# Patient Record
Sex: FEMALE | Race: BLACK OR AFRICAN AMERICAN | NOT HISPANIC OR LATINO | Employment: OTHER | URBAN - METROPOLITAN AREA
[De-identification: names, ages, dates, MRNs, and addresses within clinical notes are randomized per-mention and may not be internally consistent; named-entity substitution may affect disease eponyms.]

---

## 2017-01-11 ENCOUNTER — OFFICE VISIT (OUTPATIENT)
Dept: INTERNAL MEDICINE | Facility: CLINIC | Age: 67
End: 2017-01-11
Payer: MEDICARE

## 2017-01-11 VITALS
WEIGHT: 130.94 LBS | BODY MASS INDEX: 24.09 KG/M2 | HEIGHT: 62 IN | HEART RATE: 83 BPM | DIASTOLIC BLOOD PRESSURE: 90 MMHG | SYSTOLIC BLOOD PRESSURE: 164 MMHG

## 2017-01-11 DIAGNOSIS — M54.50 CHRONIC MIDLINE LOW BACK PAIN WITHOUT SCIATICA: ICD-10-CM

## 2017-01-11 DIAGNOSIS — G89.29 CHRONIC MIDLINE LOW BACK PAIN WITHOUT SCIATICA: ICD-10-CM

## 2017-01-11 DIAGNOSIS — Z23 NEEDS FLU SHOT: ICD-10-CM

## 2017-01-11 DIAGNOSIS — G47.00 INSOMNIA, UNSPECIFIED TYPE: ICD-10-CM

## 2017-01-11 DIAGNOSIS — D17.1 LIPOMA OF TORSO: ICD-10-CM

## 2017-01-11 DIAGNOSIS — E78.5 HYPERLIPIDEMIA, UNSPECIFIED HYPERLIPIDEMIA TYPE: ICD-10-CM

## 2017-01-11 DIAGNOSIS — D69.6 THROMBOCYTOPENIA: ICD-10-CM

## 2017-01-11 DIAGNOSIS — I10 ESSENTIAL HYPERTENSION: Primary | ICD-10-CM

## 2017-01-11 DIAGNOSIS — F17.200 TOBACCO DEPENDENCE: ICD-10-CM

## 2017-01-11 PROCEDURE — 1160F RVW MEDS BY RX/DR IN RCRD: CPT | Mod: S$GLB,,, | Performed by: INTERNAL MEDICINE

## 2017-01-11 PROCEDURE — 1159F MED LIST DOCD IN RCRD: CPT | Mod: S$GLB,,, | Performed by: INTERNAL MEDICINE

## 2017-01-11 PROCEDURE — 99214 OFFICE O/P EST MOD 30 MIN: CPT | Mod: S$GLB,,, | Performed by: INTERNAL MEDICINE

## 2017-01-11 PROCEDURE — G0008 ADMIN INFLUENZA VIRUS VAC: HCPCS | Mod: S$GLB,,, | Performed by: INTERNAL MEDICINE

## 2017-01-11 PROCEDURE — 99999 PR PBB SHADOW E&M-EST. PATIENT-LVL IV: CPT | Mod: PBBFAC,,, | Performed by: INTERNAL MEDICINE

## 2017-01-11 PROCEDURE — 3077F SYST BP >= 140 MM HG: CPT | Mod: S$GLB,,, | Performed by: INTERNAL MEDICINE

## 2017-01-11 PROCEDURE — 3080F DIAST BP >= 90 MM HG: CPT | Mod: S$GLB,,, | Performed by: INTERNAL MEDICINE

## 2017-01-11 PROCEDURE — 1125F AMNT PAIN NOTED PAIN PRSNT: CPT | Mod: S$GLB,,, | Performed by: INTERNAL MEDICINE

## 2017-01-11 PROCEDURE — 1157F ADVNC CARE PLAN IN RCRD: CPT | Mod: S$GLB,,, | Performed by: INTERNAL MEDICINE

## 2017-01-11 PROCEDURE — 90662 IIV NO PRSV INCREASED AG IM: CPT | Mod: S$GLB,,, | Performed by: INTERNAL MEDICINE

## 2017-01-11 RX ORDER — ETODOLAC 400 MG/1
400 TABLET, EXTENDED RELEASE ORAL DAILY PRN
Qty: 30 TABLET | Refills: 1 | Status: SHIPPED | OUTPATIENT
Start: 2017-01-11 | End: 2017-08-19

## 2017-01-11 RX ORDER — ETODOLAC 400 MG/1
400 TABLET, EXTENDED RELEASE ORAL DAILY PRN
Qty: 30 TABLET | Refills: 1 | Status: SHIPPED | OUTPATIENT
Start: 2017-01-11 | End: 2017-01-11 | Stop reason: SDUPTHER

## 2017-01-11 RX ORDER — TRAMADOL HYDROCHLORIDE 50 MG/1
50 TABLET ORAL EVERY 8 HOURS PRN
Qty: 60 TABLET | Refills: 0 | Status: SHIPPED | OUTPATIENT
Start: 2017-01-11 | End: 2017-01-21

## 2017-01-11 NOTE — PROGRESS NOTES
Subjective:       Patient ID: Roma Dunbar is a 66 y.o. female.    Chief Complaint: Low-back Pain and Insomnia    HPI Pt. Here for low back pain, insomnia and f/u for HTN and hyperlipedemia; lumbar xray show degenerative changes; she has not tried lodine; she states pain is 9/10; she states back is still bothersome and pain worse with ROM; of note pt. Looks comfortable during visit and does not match pain scale; she also reports SQ lump at site for past 1 week; she has restarted zocor; I reviewed labs dated 9/28/16; platelets have normalized; she smokes PPD; she would like smoking cessation course; BP is elevated  Review of Systems   Constitutional: Negative for chills, fatigue and fever.   HENT: Negative for congestion, rhinorrhea and sore throat.    Respiratory: Negative for cough, shortness of breath and wheezing.    Cardiovascular: Negative for chest pain.   Gastrointestinal: Negative for abdominal pain, nausea and vomiting.   Genitourinary: Negative for dysuria.   Musculoskeletal: Positive for back pain. Negative for arthralgias.        Low back pain with ROM   Skin: Negative for rash.        SQ lump to low back which can be painful to the touch   Neurological: Negative for dizziness and headaches.   Psychiatric/Behavioral: Negative for sleep disturbance. The patient is not nervous/anxious.        Objective:      Physical Exam   Constitutional: She is oriented to person, place, and time. She appears well-developed.   Eyes: EOM are normal.   Neck: Normal range of motion.   Cardiovascular: Normal rate, regular rhythm and normal heart sounds.    Pulmonary/Chest: Effort normal and breath sounds normal.   Abdominal: Soft. There is no tenderness. There is no rebound and no guarding.   Musculoskeletal: Normal range of motion. She exhibits tenderness.   Low back tenderness at site of olive size SQ nodule; no erythema or sign of infection noted; low back pain with ROM; pt. ROM is fluid and inconsistent with 9/10 pain  scale   Neurological: She is alert and oriented to person, place, and time.   Skin: No rash noted.       Assessment:       1. Essential hypertension Sub-optimally controlled   2. Chronic midline low back pain without sciatica Active   3. Lipoma of torso Active   4. Thrombocytopenia Well controlled   5. Insomnia, unspecified type Active   6. Hyperlipidemia, unspecified hyperlipidemia type Active   7. Tobacco dependence Sub-optimally controlled   8. Needs flu shot Active       Plan:         Essential hypertension  Comments:  continue current regimen and encouraged low Na diet; monitor with better control of low back pain; repeat BP on f/u in 1 month  Orders:  -     CBC auto differential; Future; Expected date: 2/1/17  -     Comprehensive metabolic panel; Future; Expected date: 2/1/17    Chronic midline low back pain without sciatica  Comments:  restart lodine prn and start tramadol PRN for better pain control; refer to pain management  Orders:  -     Discontinue: etodolac (LODINE XL) 400 MG 24 hr tablet; Take 1 tablet (400 mg total) by mouth daily as needed (avoid all other NSAID's).  Dispense: 30 tablet; Refill: 1  -     etodolac (LODINE XL) 400 MG 24 hr tablet; Take 1 tablet (400 mg total) by mouth daily as needed (avoid all other NSAID's).  Dispense: 30 tablet; Refill: 1  -     tramadol (ULTRAM) 50 mg tablet; Take 1 tablet (50 mg total) by mouth every 8 (eight) hours as needed for Pain.  Dispense: 60 tablet; Refill: 0  -     Ambulatory referral to Pain Clinic    Lipoma of torso  Comments:  pt. states she has noticed for only 1 week; will re-evaluate in 1 month and consider surgical referal if size increases    Thrombocytopenia  Comments:  normalized; monitor  Orders:  -     CBC auto differential; Future; Expected date: 2/1/17    Insomnia, unspecified type  Comments:  asked pt. to try benadryl prn and sleep hygeine discussed    Hyperlipidemia, unspecified hyperlipidemia type  Comments:  restarted statin; repeat  lipids prior to 1 month f/u  Orders:  -     Lipid panel; Future; Expected date: 2/1/17    Tobacco dependence  Comments:  encouraged cessation and explained risks   Orders:  -     Ambulatory referral to Smoking Cessation Program    Needs flu shot  -     Influenza - High Dose (65+) (PF) (IM)

## 2017-01-11 NOTE — MR AVS SNAPSHOT
[facility]   [place]  [place] [address]  Phone: [phone]  Fax: [phone]                  Roma Dunbar   2017 8:40 AM   Office Visit    Description:  Female : 1950   Provider:  Rolly Ferro MD   Department:  [facility]           Reason for Visit     Low-back Pain     Insomnia           Diagnoses this Visit        Comments    Essential hypertension    -  Primary continue current regimen and encouraged low Na diet; monitor with better control of low back pain; repeat BP on f/u in 1 month    Chronic midline low back pain without sciatica     restart lodine prn and start tramadol PRN for better pain control; refer to pain management    Lipoma of torso     pt. states she has noticed for only 1 week; will re-evaluate in 1 month and consider surgical referal if no better    Thrombocytopenia     normalized; monitor    Insomnia, unspecified type     asked pt. to try benadryl prn and sleep hygeine discussed    Hyperlipidemia, unspecified hyperlipidemia type     restarted statin; repeat lipids prior to 1 month f/u    Tobacco dependence     encouraged cessation and explained risks     Needs flu shot                To Do List           Future Appointments        Provider Department Dept Phone    2017 9:00 AM Princess Liu MD [facility] [phone]    2017 7:00 AM LAB, [facility] [phone]    2017 10:00 AM Rolly Ferro MD [facility] [phone]      Goals (5 Years of Data)     None      Follow-Up and Disposition     Return in about 1 month (around 2017).       These Medications        Disp Refills Start End    etodolac (LODINE XL) 400 MG 24 hr tablet 30 tablet 1 2017     Take 1 tablet (400 mg total) by mouth daily as needed (avoid all other NSAID's). - Oral    Pharmacy: [id number] - [place] - [address] AT [facility] [address]   #: [phone]       tramadol (ULTRAM) 50 mg tablet 60 tablet 0 1/11/2017 1/21/2017    Take 1 tablet (50 mg total) by mouth every 8 (eight) hours as needed for Pain. - Oral    Pharmacy: [id number] - [place] AT [facility] #: [phone]         [facility] On Call     [facility] On Call Nurse Care Line - 24/7 Assistance  Registered nurses in the [facility] On Call Center provide clinical advisement, health education, appointment booking, and other advisory services.  Call for this free service at [phone].             Medications           Message regarding Medications     Verify the changes and/or additions to your medication regime listed below are the same as discussed with your clinician today.  If any of these changes or additions are incorrect, please notify your healthcare provider.        START taking these NEW medications        Refills    tramadol (ULTRAM) 50 mg tablet 0    Sig: Take 1 tablet (50 mg total) by mouth every 8 (eight) hours as needed for Pain.    Class: Print    Route: Oral           Verify that the below list of medications is an accurate representation of the medications you are currently taking.  If none reported, the list may be blank. If incorrect, please contact your healthcare provider. Carry this list with you in case of emergency.           Current Medications     aspirin 81 MG Chew Take 1 tablet (81 mg total) by mouth once daily.    cilostazol (PLETAL) 50 MG Tab Take 1 tablet (50 mg total) by mouth 2 (two) times daily.    etodolac (LODINE XL) 400 MG 24 hr tablet Take 1 tablet (400 mg total) by mouth daily as needed (avoid all other NSAID's).    lisinopril-hydrochlorothiazide (PRINZIDE,ZESTORETIC) 20-12.5 mg per tablet Take 1 tablet by mouth 2 (two) times daily.    metoprolol succinate (TOPROL-XL) 25 MG 24 hr tablet Take 1 tablet (25 mg total) by mouth once daily.    simvastatin (ZOCOR) 20 MG tablet Take 1 tablet (20 mg  "total) by mouth every evening.    acyclovir (ZOVIRAX) 800 MG Tab Take 1 tablet (800 mg total) by mouth 5 (five) times daily.    loratadine (CLARITIN) 10 mg tablet Take 1 tablet (10 mg total) by mouth once daily.    oxybutynin (DITROPAN) 5 MG Tab Take 1 tablet (5 mg total) by mouth 2 (two) times daily.    tramadol (ULTRAM) 50 mg tablet Take 1 tablet (50 mg total) by mouth every 8 (eight) hours as needed for Pain.           Clinical Reference Information           Vital Signs - Last Recorded  Most recent update: 1/11/2017  8:35 AM by Oneida Jasso MA    BP Pulse Ht Wt BMI    (!) 164/90 (BP Location: Right arm, Patient Position: Sitting, BP Method: Manual) 83 5' 2" (1.575 m) 59.4 kg (130 lb 15.3 oz) 23.95 kg/m2      Blood Pressure          Most Recent Value    BP  (!)  164/90      Allergies as of 1/11/2017     No Known Allergies      Immunizations Administered on Date of Encounter - 1/11/2017     Name Date Dose VIS Date Route    Influenza - High Dose 1/11/2017 0.5 mL 8/7/2015 Intramuscular      Orders Placed During Today's Visit      Normal Orders This Visit    Ambulatory referral to Pain Clinic     Ambulatory referral to Smoking Cessation Program     Influenza - High Dose (65+) (PF) (IM)     Future Labs/Procedures Expected by Expires    CBC auto differential  2/1/2017 9/11/2017    Comprehensive metabolic panel  2/1/2017 9/11/2017    Lipid panel  2/1/2017 9/11/2017      Instructions    Flu consent signed, bandaid applied, pt instructed to wait 15 minutes in [facility]       Smoking Cessation     If you would like to quit smoking:   You may be eligible for free services if you are a [place] resident and started smoking cigarettes before September 1, 1988.  Call the Smoking Cessation Trust ([employer]) toll free at ([phone] or [phone] [phone].   Call 5-800-QUIT-NOW if you do not meet the above criteria.            "

## 2017-01-17 ENCOUNTER — OFFICE VISIT (OUTPATIENT)
Dept: PAIN MEDICINE | Facility: CLINIC | Age: 67
End: 2017-01-17
Payer: MEDICARE

## 2017-01-17 VITALS
DIASTOLIC BLOOD PRESSURE: 60 MMHG | WEIGHT: 125 LBS | HEART RATE: 62 BPM | SYSTOLIC BLOOD PRESSURE: 108 MMHG | BODY MASS INDEX: 22.86 KG/M2

## 2017-01-17 DIAGNOSIS — M46.1 SACROILIITIS: Primary | ICD-10-CM

## 2017-01-17 DIAGNOSIS — M54.16 RIGHT LUMBAR RADICULOPATHY: ICD-10-CM

## 2017-01-17 DIAGNOSIS — M47.816 LUMBAR FACET ARTHROPATHY: ICD-10-CM

## 2017-01-17 PROCEDURE — 1157F ADVNC CARE PLAN IN RCRD: CPT | Mod: S$GLB,,, | Performed by: ANESTHESIOLOGY

## 2017-01-17 PROCEDURE — 99204 OFFICE O/P NEW MOD 45 MIN: CPT | Mod: S$GLB,,, | Performed by: ANESTHESIOLOGY

## 2017-01-17 PROCEDURE — 3074F SYST BP LT 130 MM HG: CPT | Mod: S$GLB,,, | Performed by: ANESTHESIOLOGY

## 2017-01-17 PROCEDURE — 1160F RVW MEDS BY RX/DR IN RCRD: CPT | Mod: S$GLB,,, | Performed by: ANESTHESIOLOGY

## 2017-01-17 PROCEDURE — 1125F AMNT PAIN NOTED PAIN PRSNT: CPT | Mod: S$GLB,,, | Performed by: ANESTHESIOLOGY

## 2017-01-17 PROCEDURE — 99999 PR PBB SHADOW E&M-EST. PATIENT-LVL III: CPT | Mod: PBBFAC,,, | Performed by: ANESTHESIOLOGY

## 2017-01-17 PROCEDURE — 1159F MED LIST DOCD IN RCRD: CPT | Mod: S$GLB,,, | Performed by: ANESTHESIOLOGY

## 2017-01-17 PROCEDURE — 3078F DIAST BP <80 MM HG: CPT | Mod: S$GLB,,, | Performed by: ANESTHESIOLOGY

## 2017-01-17 NOTE — PROGRESS NOTES
Chronic Pain - New Consult    Referring Physician: Rolly Ferro MD    Chief Complaint:   Chief Complaint   Patient presents with    Low-back Pain     referred by Dr. Ferro    Leg Pain     right sided        SUBJECTIVE:    Roma Dunbar presents to the clinic for the evaluation of back pain. The pain started 1 week ago and symptoms have been worsening.The pain is located in the lower back area and radiates to the right lower extremity.  The pain is described as aching and sharp and is rated as 9/10. The pain is rated with a score of  6/10 on the BEST day and a score of 10/10 on the WORST day.  Symptoms interfere with daily activity, sleeping and work. The pain is exacerbated by Laying, Walking, Night Time, Morning and Getting out of bed/chair.  The pain is mitigated by heat and medications. She reports spending 6-7 hours per day reclining. The patient reports 4 hours of uninterrupted sleep per night.    Patient denies night fever/night sweats, urinary incontinence, bowel incontinence, significant weight loss, significant motor weakness and loss of sensations.    Physical Therapy/Home Exercise: no      Pain Disability Index Review:  Last 3 PDI Scores 1/17/2017   Pain Disability Index (PDI) 45       Pain Medications:    - Opioids: Ultram (Tramadol HCL)  - Adjuvant Medications: Lodine XL  - Anti-Coagulants: Aspirin  - Others: see medications list     report:  Reviewed and consistent with medication use as prescribed.    Pain Procedures: None    Imaging: X-Ray Lumbar Spine AP And Lateral 8/24/16  Narrative   HISTORY: Back pain    COMPARISON: None    FINDINGS: 3 views.  The osseous structures are intact with no evidence of fracture or dislocation.  There is degenerative osteoarthritic change with facet hypertrophy of the lumbar spine.  There is abdominal aortic atherosclerosis.  The joint spaces and soft tissues are otherwise unremarkable.   Impression       #1. As above.  ______________________________________      Electronically signed by: ANIL RUIZ MD  Date: 08/24/16  Time: 09:48          Past Medical History   Diagnosis Date    Coronary artery disease     GERD (gastroesophageal reflux disease)     Herpes simplex without mention of complication     Hyperlipidemia     Hypertension     PAD (peripheral artery disease)      Past Surgical History   Procedure Laterality Date    Hysterectomy      Bka   mid 2012     Right side at shin level amputation. 2/2 PVD    Cardiac surgery      Coronary artery bypass graft       before [place]     Social History     Social History    Marital status: Single     Spouse name: N/A    Number of children: N/A    Years of education: N/A     Occupational History    Not on file.     Social History Main Topics    Smoking status: Current Some Day Smoker     Packs/day: 1.50     Years: 40.00     Types: Cigarettes    Smokeless tobacco: Not on file    Alcohol use Yes      Comment: beer on weekends    Drug use: No    Sexual activity: Not on file     Other Topics Concern    Not on file     Social History Narrative     Family History   Problem Relation Age of Onset    Hypertension Mother     Diabetes Mother        Review of patient's allergies indicates:  No Known Allergies    Current Outpatient Prescriptions   Medication Sig    aspirin 81 MG Chew Take 1 tablet (81 mg total) by mouth once daily.    cilostazol (PLETAL) 50 MG Tab Take 1 tablet (50 mg total) by mouth 2 (two) times daily.    etodolac (LODINE XL) 400 MG 24 hr tablet Take 1 tablet (400 mg total) by mouth daily as needed (avoid all other NSAID's).    lisinopril-hydrochlorothiazide (PRINZIDE,ZESTORETIC) 20-12.5 mg per tablet Take 1 tablet by mouth 2 (two) times daily.    metoprolol succinate (TOPROL-XL) 25 MG 24 hr tablet Take 1 tablet (25 mg total) by mouth once daily.    simvastatin (ZOCOR) 20 MG tablet Take 1 tablet (20 mg total) by mouth every evening.    tramadol (ULTRAM) 50 mg tablet Take 1 tablet (50 mg  total) by mouth every 8 (eight) hours as needed for Pain.    acyclovir (ZOVIRAX) 800 MG Tab Take 1 tablet (800 mg total) by mouth 5 (five) times daily.    loratadine (CLARITIN) 10 mg tablet Take 1 tablet (10 mg total) by mouth once daily.    oxybutynin (DITROPAN) 5 MG Tab Take 1 tablet (5 mg total) by mouth 2 (two) times daily.     No current facility-administered medications for this visit.        REVIEW OF SYSTEMS:    GENERAL:  No weight loss, malaise or fevers.  HEENT:  Negative for frequent or significant headaches.  NECK:  + neck pain   RESPIRATORY:  + smoker, Negative for cough, wheezing or shortness of breath.  CARDIOVASCULAR:+ CAD sp CABG years ago , + PAD sp BKA  Negative for chest pain, leg swelling or palpitations.  GI:  Negative for abdominal discomfort, blood in stools or black stools or change in bowel habits.  MUSCULOSKELETAL:  See HPI.  SKIN:  Negative for lesions, rash, and itching.  PSYCH: + for sleep disturbance, -ve for mood disorder and recent psychosocial stressors.  HEMATOLOGY/LYMPHOLOGY:  Negative for prolonged bleeding, bruising easily or swollen nodes.  NEURO:   No history of headaches, syncope, paralysis, seizures or tremors.  All other reviewed and negative other than HPI.    OBJECTIVE:    Visit Vitals    /60    Pulse 62    Wt 56.7 kg (125 lb)    BMI 22.86 kg/m2       PHYSICAL EXAMINATION:    General appearance: Well appearing, in no acute distress, alert and oriented x3.  Psych:  Mood and affect appropriate.  Skin: Skin color, texture, turgor normal, no rashes or lesions, in both upper and lower body.  Head/face:  Normocephalic, atraumatic. No palpable lymph nodes.  Neck: No pain to palpation over the cervical paraspinous muscles. Spurling Negative. No pain with neck flexion, extension, or lateral flexion.   Cor: RRR  Pulm: CTA  Back: Straight leg raising in the sitting and supine positions is negative to radicular pain.+ TTP over the right PSIS and L spine   Extremities:+  artificial right leg  Peripheral joint ROM is full and pain free without obvious instability or laxity in all four extremities. No deformities, edema, or skin discoloration. Good capillary refill.  Musculoskeletal: + Stephen test on the right  + facet loading Bilateral upper and lower extremity strength is normal and symmetric.  No atrophy or tone abnormalities are noted.  Neuro: Bilateral upper and lower extremity coordination and muscle stretch reflexes are physiologic and symmetric. (cannot elicit patellar and ankle R on the right side due to artificial limb)   Plantar response are downgoing. No loss of sensation is noted.  Gait: normal.    ASSESSMENT: 66 y.o. year old female with low back and right buttock/thigh  pain, consistent with sacroiliitis , lumbar facet arthropathy and possible right lumbar radiculopathy     1. Sacroiliitis    2. Right lumbar radiculopathy    3. Lumbar facet arthropathy            PLAN:     - I have stressed the importance of physical activity and a home exercise plan to help with pain and improve health.  -SF right SIJ steroid injection  -Might consider MBB . May consider MRI if no response   - RTC 2 weeks after injection  - Counseled patient regarding the importance of activity modification, smoking cessation and physical therapy.    The above plan and management options were discussed at length with patient. Patient is in agreement with the above and verbalized understanding. It will be communicated with the referring physician via electronic record, fax, or mail.    Princess Liu  01/17/2017

## 2017-01-17 NOTE — LETTER
January 17, 2017      Rolly Ferro MD  [address] [facility]  [place] [address]           [place] [facility]  [address] [place] [address] [address]  [place] [address]  Phone: [phone]          Patient: Roma Dunbar   MR Number: [id number]   YOB: 1950   Date of Visit: 1/17/2017       Dear Dr. Rolly Ferro:    Thank you for referring Roma Dunbar to me for evaluation. Attached you will find relevant portions of my assessment and plan of care.    If you have questions, please do not hesitate to call me. I look forward to following Roma Dunbar along with you.    Sincerely,    Princess Liu MD    Enclosure  CC:  No Recipients    If you would like to receive this communication electronically, please contact [email or web address] or [phone] to request more information on [employer] access.    For providers and/or their staff who would like to refer a patient to [facility], please contact us through our one-stop-shop provider referral line, Edgar Benitez, at [phone].    If you feel you have received this communication in error or would no longer like to receive these types of communications, please e-mail [email or web address]

## 2017-01-17 NOTE — MR AVS SNAPSHOT
[place] [facility]  [address] [facility] [address]  [place] [address]  Phone: [phone]                  Roma Dunbar   2017 1:30 PM   Office Visit    Description:  Female : 1950   Provider:  Princess Liu MD   Department:  [place] [facility]           Reason for Visit     Low-back Pain     Leg Pain           Diagnoses this Visit        Comments    Sacroiliitis    -  Primary     Right lumbar radiculopathy         Lumbar facet arthropathy                To Do List           Future Appointments        Provider Department Dept Phone    2017 7:00 AM [facility] [phone]    2017 10:00 AM Rolly Ferro MD [facility] [phone]    2017 9:30 AM Princess Liu MD [place] [facility] [phone]      Goals (5 Years of Data)     None      [facility]     [facility] On [facility] Nurse Care Line -  Assistance  Registered nurses in the [facility] On [facility] Center provide clinical advisement, health education, appointment booking, and other advisory services.  Call for this free service at [phone].             Medications           Message regarding Medications     Verify the changes and/or additions to your medication regime listed below are the same as discussed with your clinician today.  If any of these changes or additions are incorrect, please notify your healthcare provider.             Verify that the below list of medications is an accurate representation of the medications you are currently taking.  If none reported, the list may be blank. If incorrect, please contact your healthcare provider. Carry this list with you in case of emergency.           Current Medications     acyclovir (ZOVIRAX) 800 MG Tab Take 1 tablet (800 mg total) by mouth 5 (five) times daily.    aspirin 81 MG Chew Take 1 tablet (81 mg total) by mouth once daily.    cilostazol (PLETAL) 50 MG Tab Take 1 tablet (50 mg total) by mouth 2 (two)  times daily.    etodolac (LODINE XL) 400 MG 24 hr tablet Take 1 tablet (400 mg total) by mouth daily as needed (avoid all other NSAID's).    lisinopril-hydrochlorothiazide (PRINZIDE,ZESTORETIC) 20-12.5 mg per tablet Take 1 tablet by mouth 2 (two) times daily.    loratadine (CLARITIN) 10 mg tablet Take 1 tablet (10 mg total) by mouth once daily.    metoprolol succinate (TOPROL-XL) 25 MG 24 hr tablet Take 1 tablet (25 mg total) by mouth once daily.    oxybutynin (DITROPAN) 5 MG Tab Take 1 tablet (5 mg total) by mouth 2 (two) times daily.    simvastatin (ZOCOR) 20 MG tablet Take 1 tablet (20 mg total) by mouth every evening.    tramadol (ULTRAM) 50 mg tablet Take 1 tablet (50 mg total) by mouth every 8 (eight) hours as needed for Pain.           Clinical Reference Information           Vital Signs - Last Recorded  Most recent update: 1/17/2017  1:16 PM by Trinidad Buchanan MA    BP Pulse Wt BMI       108/60 62 56.7 kg (125 lb) 22.86 kg/m2       Blood Pressure          Most Recent Value    BP  108/60      Allergies as of 1/17/2017     No Known Allergies      Immunizations Administered on Date of Encounter - 1/17/2017     None      Smoking Cessation     If you would like to quit smoking:   You may be eligible for free services if you are a [place] resident and started smoking cigarettes before September 1, 1988.  Call the Smoking Cessation Trust ([facility]) toll free at ([phone] or [phone] [phone].   Call 4-800-QUIT-NOW if you do not meet the above criteria.

## 2017-01-24 ENCOUNTER — TELEPHONE (OUTPATIENT)
Dept: PAIN MEDICINE | Facility: CLINIC | Age: 67
End: 2017-01-24

## 2017-01-24 NOTE — TELEPHONE ENCOUNTER
Spoke with patient regarding time of arrival for procedure that is scheduled on 1/25/17. Patient verbalized understanding and confirmed procedure date and time of arrival on 1/25/17 at 1045 AM.

## 2017-01-25 ENCOUNTER — TELEPHONE (OUTPATIENT)
Dept: PAIN MEDICINE | Facility: CLINIC | Age: 67
End: 2017-01-25

## 2017-01-25 NOTE — TELEPHONE ENCOUNTER
Spoke with patient regarding canceling procedure. Patient stated that she want to cancel procedure because she is scared and would like to get an xray before she gets anything done to her back. Patient stated she would like to have an office visit. Patient verbalized and confirmed appt date and time on  2/6/17 at 1115 AM.

## 2017-01-25 NOTE — TELEPHONE ENCOUNTER
----- Message from Yulia Santiago sent at 1/24/2017  3:24 PM CST -----  Contact: self/[phone]  Patient would like to cancel her procedure for tomorrow because she is not comfortable with it.  Please advise

## 2017-04-27 DIAGNOSIS — I10 ESSENTIAL HYPERTENSION: ICD-10-CM

## 2017-04-27 RX ORDER — LISINOPRIL AND HYDROCHLOROTHIAZIDE 12.5; 2 MG/1; MG/1
TABLET ORAL
Qty: 180 TABLET | Refills: 0 | Status: SHIPPED | OUTPATIENT
Start: 2017-04-27 | End: 2017-11-16 | Stop reason: SDUPTHER

## 2017-05-08 ENCOUNTER — TELEPHONE (OUTPATIENT)
Dept: INTERNAL MEDICINE | Facility: CLINIC | Age: 67
End: 2017-05-08

## 2017-05-08 DIAGNOSIS — I73.9 PVD (PERIPHERAL VASCULAR DISEASE): ICD-10-CM

## 2017-05-08 RX ORDER — CILOSTAZOL 50 MG/1
TABLET ORAL
Qty: 180 TABLET | Refills: 0 | Status: SHIPPED | OUTPATIENT
Start: 2017-05-08 | End: 2017-08-19

## 2017-08-19 ENCOUNTER — NURSE TRIAGE (OUTPATIENT)
Dept: ADMINISTRATIVE | Facility: CLINIC | Age: 67
End: 2017-08-19

## 2017-08-19 ENCOUNTER — HOSPITAL ENCOUNTER (INPATIENT)
Facility: HOSPITAL | Age: 67
LOS: 2 days | Discharge: HOME | DRG: 378 | End: 2017-08-21
Attending: EMERGENCY MEDICINE | Admitting: INTERNAL MEDICINE
Payer: MEDICARE

## 2017-08-19 DIAGNOSIS — K92.1 MELENA: ICD-10-CM

## 2017-08-19 DIAGNOSIS — K92.1 GASTROINTESTINAL HEMORRHAGE WITH MELENA: Primary | ICD-10-CM

## 2017-08-19 DIAGNOSIS — F17.200 TOBACCO DEPENDENCE: ICD-10-CM

## 2017-08-19 DIAGNOSIS — I25.10 CORONARY ARTERY DISEASE INVOLVING NATIVE CORONARY ARTERY OF NATIVE HEART WITHOUT ANGINA PECTORIS: ICD-10-CM

## 2017-08-19 DIAGNOSIS — E78.5 HYPERLIPIDEMIA, UNSPECIFIED HYPERLIPIDEMIA TYPE: ICD-10-CM

## 2017-08-19 DIAGNOSIS — R53.81 MALAISE: ICD-10-CM

## 2017-08-19 DIAGNOSIS — I10 ESSENTIAL HYPERTENSION: ICD-10-CM

## 2017-08-19 LAB
ABO + RH BLD: NORMAL
ALBUMIN SERPL BCP-MCNC: 3.9 G/DL
ALP SERPL-CCNC: 58 U/L
ALT SERPL W/O P-5'-P-CCNC: 9 U/L
ANION GAP SERPL CALC-SCNC: 10 MMOL/L
APTT BLDCRRT: 26 SEC
AST SERPL-CCNC: 14 U/L
BASOPHILS # BLD AUTO: 0.01 K/UL
BASOPHILS # BLD AUTO: 0.01 K/UL
BASOPHILS NFR BLD: 0.2 %
BASOPHILS NFR BLD: 0.2 %
BILIRUB SERPL-MCNC: 0.4 MG/DL
BILIRUB UR QL STRIP: NEGATIVE
BLD GP AB SCN CELLS X3 SERPL QL: NORMAL
BUN SERPL-MCNC: 30 MG/DL
CALCIUM SERPL-MCNC: 9.3 MG/DL
CHLORIDE SERPL-SCNC: 103 MMOL/L
CHOLEST/HDLC SERPL: 3.9 {RATIO}
CLARITY UR: CLEAR
CO2 SERPL-SCNC: 22 MMOL/L
COLOR UR: YELLOW
CREAT SERPL-MCNC: 0.8 MG/DL
DIFFERENTIAL METHOD: ABNORMAL
DIFFERENTIAL METHOD: ABNORMAL
EOSINOPHIL # BLD AUTO: 0.1 K/UL
EOSINOPHIL # BLD AUTO: 0.1 K/UL
EOSINOPHIL NFR BLD: 1 %
EOSINOPHIL NFR BLD: 1.2 %
ERYTHROCYTE [DISTWIDTH] IN BLOOD BY AUTOMATED COUNT: 13.9 %
ERYTHROCYTE [DISTWIDTH] IN BLOOD BY AUTOMATED COUNT: 14 %
EST. GFR  (AFRICAN AMERICAN): >60 ML/MIN/1.73 M^2
EST. GFR  (NON AFRICAN AMERICAN): >60 ML/MIN/1.73 M^2
FERRITIN SERPL-MCNC: 58 NG/ML
FOLATE SERPL-MCNC: 11.8 NG/ML
GLUCOSE SERPL-MCNC: 89 MG/DL
GLUCOSE UR QL STRIP: NEGATIVE
HCT VFR BLD AUTO: 26.6 %
HCT VFR BLD AUTO: 32.1 %
HDL/CHOLESTEROL RATIO: 25.7 %
HDLC SERPL-MCNC: 152 MG/DL
HDLC SERPL-MCNC: 39 MG/DL
HGB BLD-MCNC: 11.2 G/DL
HGB BLD-MCNC: 9.3 G/DL
HGB UR QL STRIP: NEGATIVE
INR PPP: 1.1
INR PPP: 1.1
IRON SERPL-MCNC: 62 UG/DL
KETONES UR QL STRIP: NEGATIVE
LACTATE SERPL-SCNC: 0.7 MMOL/L
LDLC SERPL CALC-MCNC: 95.6 MG/DL
LEUKOCYTE ESTERASE UR QL STRIP: NEGATIVE
LYMPHOCYTES # BLD AUTO: 1.9 K/UL
LYMPHOCYTES # BLD AUTO: 2 K/UL
LYMPHOCYTES NFR BLD: 34.8 %
LYMPHOCYTES NFR BLD: 39.4 %
MCH RBC QN AUTO: 31.4 PG
MCH RBC QN AUTO: 31.5 PG
MCHC RBC AUTO-ENTMCNC: 34.9 G/DL
MCHC RBC AUTO-ENTMCNC: 35 G/DL
MCV RBC AUTO: 90 FL
MCV RBC AUTO: 90 FL
MONOCYTES # BLD AUTO: 0.2 K/UL
MONOCYTES # BLD AUTO: 0.4 K/UL
MONOCYTES NFR BLD: 5 %
MONOCYTES NFR BLD: 7.5 %
NEUTROPHILS # BLD AUTO: 2.6 K/UL
NEUTROPHILS # BLD AUTO: 3.2 K/UL
NEUTROPHILS NFR BLD: 54.2 %
NEUTROPHILS NFR BLD: 56 %
NITRITE UR QL STRIP: NEGATIVE
NONHDLC SERPL-MCNC: 113 MG/DL
OB PNL STL: POSITIVE
PH UR STRIP: 5 [PH] (ref 5–8)
PLATELET # BLD AUTO: 122 K/UL
PLATELET # BLD AUTO: 132 K/UL
PMV BLD AUTO: 11.3 FL
PMV BLD AUTO: 11.6 FL
POTASSIUM SERPL-SCNC: 3.6 MMOL/L
PROT SERPL-MCNC: 7.2 G/DL
PROT UR QL STRIP: NEGATIVE
PROTHROMBIN TIME: 11.4 SEC
PROTHROMBIN TIME: 11.7 SEC
RBC # BLD AUTO: 2.95 M/UL
RBC # BLD AUTO: 3.57 M/UL
RETICS/RBC NFR AUTO: 2 %
SATURATED IRON: 16 %
SODIUM SERPL-SCNC: 135 MMOL/L
SP GR UR STRIP: <=1.005 (ref 1–1.03)
TOTAL IRON BINDING CAPACITY: 400 UG/DL
TRANSFERRIN SERPL-MCNC: 270 MG/DL
TRIGL SERPL-MCNC: 87 MG/DL
TROPONIN I SERPL DL<=0.01 NG/ML-MCNC: 0.1 NG/ML
TROPONIN I SERPL DL<=0.01 NG/ML-MCNC: 0.11 NG/ML
TSH SERPL DL<=0.005 MIU/L-ACNC: 2.22 UIU/ML
URN SPEC COLLECT METH UR: ABNORMAL
UROBILINOGEN UR STRIP-ACNC: NEGATIVE EU/DL
VIT B12 SERPL-MCNC: 327 PG/ML
WBC # BLD AUTO: 4.82 K/UL
WBC # BLD AUTO: 5.75 K/UL

## 2017-08-19 PROCEDURE — 83540 ASSAY OF IRON: CPT

## 2017-08-19 PROCEDURE — 96366 THER/PROPH/DIAG IV INF ADDON: CPT

## 2017-08-19 PROCEDURE — 25000003 PHARM REV CODE 250: Performed by: EMERGENCY MEDICINE

## 2017-08-19 PROCEDURE — 93005 ELECTROCARDIOGRAM TRACING: CPT

## 2017-08-19 PROCEDURE — 86901 BLOOD TYPING SEROLOGIC RH(D): CPT

## 2017-08-19 PROCEDURE — 81003 URINALYSIS AUTO W/O SCOPE: CPT

## 2017-08-19 PROCEDURE — 85610 PROTHROMBIN TIME: CPT

## 2017-08-19 PROCEDURE — 82746 ASSAY OF FOLIC ACID SERUM: CPT

## 2017-08-19 PROCEDURE — 80061 LIPID PANEL: CPT

## 2017-08-19 PROCEDURE — 82607 VITAMIN B-12: CPT

## 2017-08-19 PROCEDURE — 36415 COLL VENOUS BLD VENIPUNCTURE: CPT

## 2017-08-19 PROCEDURE — 96375 TX/PRO/DX INJ NEW DRUG ADDON: CPT

## 2017-08-19 PROCEDURE — C9113 INJ PANTOPRAZOLE SODIUM, VIA: HCPCS | Performed by: EMERGENCY MEDICINE

## 2017-08-19 PROCEDURE — 83036 HEMOGLOBIN GLYCOSYLATED A1C: CPT

## 2017-08-19 PROCEDURE — 83605 ASSAY OF LACTIC ACID: CPT

## 2017-08-19 PROCEDURE — 11000001 HC ACUTE MED/SURG PRIVATE ROOM

## 2017-08-19 PROCEDURE — 93010 ELECTROCARDIOGRAM REPORT: CPT | Mod: 76,,, | Performed by: INTERNAL MEDICINE

## 2017-08-19 PROCEDURE — 85025 COMPLETE CBC W/AUTO DIFF WBC: CPT | Mod: 91

## 2017-08-19 PROCEDURE — 85045 AUTOMATED RETICULOCYTE COUNT: CPT

## 2017-08-19 PROCEDURE — 84484 ASSAY OF TROPONIN QUANT: CPT

## 2017-08-19 PROCEDURE — 85610 PROTHROMBIN TIME: CPT | Mod: 91

## 2017-08-19 PROCEDURE — 86900 BLOOD TYPING SEROLOGIC ABO: CPT

## 2017-08-19 PROCEDURE — 82272 OCCULT BLD FECES 1-3 TESTS: CPT

## 2017-08-19 PROCEDURE — 82728 ASSAY OF FERRITIN: CPT

## 2017-08-19 PROCEDURE — 63600175 PHARM REV CODE 636 W HCPCS: Performed by: EMERGENCY MEDICINE

## 2017-08-19 PROCEDURE — 93010 ELECTROCARDIOGRAM REPORT: CPT | Mod: ,,, | Performed by: INTERNAL MEDICINE

## 2017-08-19 PROCEDURE — 85730 THROMBOPLASTIN TIME PARTIAL: CPT

## 2017-08-19 PROCEDURE — 99291 CRITICAL CARE FIRST HOUR: CPT | Mod: 25

## 2017-08-19 PROCEDURE — 96361 HYDRATE IV INFUSION ADD-ON: CPT

## 2017-08-19 PROCEDURE — 80053 COMPREHEN METABOLIC PANEL: CPT

## 2017-08-19 PROCEDURE — 96365 THER/PROPH/DIAG IV INF INIT: CPT

## 2017-08-19 PROCEDURE — 84484 ASSAY OF TROPONIN QUANT: CPT | Mod: 91

## 2017-08-19 PROCEDURE — 84443 ASSAY THYROID STIM HORMONE: CPT

## 2017-08-19 RX ORDER — PANTOPRAZOLE SODIUM 40 MG/10ML
80 INJECTION, POWDER, LYOPHILIZED, FOR SOLUTION INTRAVENOUS
Status: COMPLETED | OUTPATIENT
Start: 2017-08-19 | End: 2017-08-19

## 2017-08-19 RX ORDER — CILOSTAZOL 50 MG/1
50 TABLET ORAL 2 TIMES DAILY
COMMUNITY
End: 2017-11-27 | Stop reason: SDUPTHER

## 2017-08-19 RX ORDER — ATORVASTATIN CALCIUM 20 MG/1
20 TABLET, FILM COATED ORAL DAILY
Status: DISCONTINUED | OUTPATIENT
Start: 2017-08-20 | End: 2017-08-21 | Stop reason: HOSPADM

## 2017-08-19 RX ADMIN — SODIUM CHLORIDE 1000 ML: 0.9 INJECTION, SOLUTION INTRAVENOUS at 02:08

## 2017-08-19 RX ADMIN — DEXTROSE 8 MG/HR: 50 INJECTION, SOLUTION INTRAVENOUS at 08:08

## 2017-08-19 RX ADMIN — DEXTROSE 8 MG/HR: 50 INJECTION, SOLUTION INTRAVENOUS at 03:08

## 2017-08-19 RX ADMIN — PANTOPRAZOLE SODIUM 80 MG: 40 INJECTION, POWDER, FOR SOLUTION INTRAVENOUS at 02:08

## 2017-08-19 NOTE — TELEPHONE ENCOUNTER
"  Reason for Disposition   Rectal bleeding, bloody stool, or tarry-black stool   Tarry or jet black-colored stool (not dark green)    Answer Assessment - Initial Assessment Questions  1. DESCRIPTION: "Describe your dizziness."      Feeling really dizzy like she needs to sit down  2. LIGHTHEADED: "Do you feel lightheaded?" (e.g., somewhat faint, woozy, weak upon standing)      Yes. Weak upon standing  3. VERTIGO: "Do you feel like either you or the room is spinning or tilting?" (i.e. vertigo)      No  4. SEVERITY: "How bad is it?"  "Do you feel like you are going to faint?" "Can you stand and walk?"    - MILD - walking normally    - MODERATE - interferes with normal activities (e.g., work, school)     - SEVERE - unable to stand, requires support to walk, feels like passing out now.       Mild  5. ONSET:  "When did the dizziness begin?"      About 11:30 a today  6. AGGRAVATING FACTORS: "Does anything make it worse?" (e.g., standing, change in head position)      Standing  7. HEART RATE: "Can you tell me your heart rate?" "How many beats in 15 seconds?"  (Note: not all patients can do this)        No  8. CAUSE: "What do you think is causing the dizziness?"      BP elevated but no cuff at home to take bp   9. RECURRENT SYMPTOM: "Have you had dizziness before?" If so, ask: "When was the last time?" "What happened that time?"      no  10. OTHER SYMPTOMS: "Do you have any other symptoms?" (e.g., fever, chest pain, vomiting, diarrhea, bleeding)     Black stools  11. PREGNANCY: "Is there any chance you are pregnant?" "When was your last menstrual period?"      no    Protocols used: ST DIZZINESS-A-AH, ST RECTAL BLEEDING-A-AH    Patient c/o black tarry stools and dizziness.  "

## 2017-08-19 NOTE — ED TRIAGE NOTES
Pt reports black stools since Wednesday with weakness and dizziness that worsened today.  Denies chest pain or shortness of breath. Familial hx of colorectal CA.

## 2017-08-19 NOTE — ED PROVIDER NOTES
Encounter Date: 8/19/2017       History     Chief Complaint   Patient presents with    GI Bleeding     black stools for 4 days, feeling weak, + sob, denies chest pain. + lower abd pain    Abdominal Pain     Pt is a 66 yo woman with h/o CAD, CABG, PVD on pletal, HTN, hyperlipidemia and right BKA comes to with 3-4 days of generalized weakness, abdominal cramping and black stools. She denies chest pain.           Review of patient's allergies indicates:  No Known Allergies  Past Medical History:   Diagnosis Date    Coronary artery disease     GERD (gastroesophageal reflux disease)     Herpes simplex without mention of complication     Hyperlipidemia     Hypertension     PAD (peripheral artery disease)      Past Surgical History:   Procedure Laterality Date    BKA   mid 2012    Right side at shin level amputation. 2/2 PVD    CARDIAC SURGERY      CORONARY ARTERY BYPASS GRAFT      before Lindsey    HYSTERECTOMY       Family History   Problem Relation Age of Onset    Hypertension Mother     Diabetes Mother     Colon cancer Maternal Grandmother      Social History   Substance Use Topics    Smoking status: Current Some Day Smoker     Packs/day: 1.50     Years: 40.00     Types: Cigarettes    Smokeless tobacco: Never Used    Alcohol use Yes      Comment: beer on weekends     Review of Systems   Constitutional: Positive for fatigue. Negative for fever.   HENT: Negative for sore throat.    Respiratory: Negative for chest tightness and shortness of breath.    Cardiovascular: Negative for chest pain.   Gastrointestinal: Positive for abdominal pain and blood in stool. Negative for abdominal distention and nausea.   Genitourinary: Negative for dysuria.   Musculoskeletal: Positive for back pain (with mvt only).   Skin: Negative for rash.   Neurological: Positive for light-headedness. Negative for weakness and headaches.   Hematological: Does not bruise/bleed easily.   All other systems reviewed and are  negative.      Physical Exam     Initial Vitals [08/19/17 1253]   BP Pulse Resp Temp SpO2   (!) 214/90 (!) 113 18 98.4 °F (36.9 °C) 100 %      MAP       131.33         Physical Exam    Nursing note and vitals reviewed.  Constitutional: Vital signs are normal. She appears well-developed and well-nourished. She is not diaphoretic. No distress.   HENT:   Head: Normocephalic and atraumatic.   Eyes: Conjunctivae and EOM are normal. Pupils are equal, round, and reactive to light.   Neck: Normal range of motion. Neck supple.   Cardiovascular: Normal rate, regular rhythm and normal heart sounds.   Pulmonary/Chest: Breath sounds normal. No respiratory distress. She has no wheezes. She has no rhonchi. She has no rales.   Abdominal: Soft. She exhibits no distension. There is no tenderness. There is no rebound and no guarding.   Genitourinary: Rectal exam shows guaiac positive stool (black stool). Guaiac positive stool (black stool). : Acceptable.  Musculoskeletal: Normal range of motion. She exhibits no edema or tenderness.   Neurological: She is alert and oriented to person, place, and time.   Skin: Skin is warm and dry.   Psychiatric: She has a normal mood and affect.         ED Course   Critical Care  Date/Time: 8/19/2017 3:30 PM  Performed by: SUDHEER CROCKETT.  Authorized by: SUDHEER CROCKETT   Direct patient critical care time: 15 minutes  Additional history critical care time: 5 minutes  Ordering / reviewing critical care time: 10 minutes  Documentation critical care time: 10 minutes  Consulting other physicians critical care time: 5 minutes  Total critical care time (exclusive of procedural time) : 45 minutes  Critical care time was exclusive of separately billable procedures and treating other patients and teaching time.  Critical care was necessary to treat or prevent imminent or life-threatening deterioration of the following conditions: GI bleed, NSTEMI.  Critical care was time spent personally by  me on the following activities: discussions with consultants, evaluation of patient's response to treatment, ordering and review of laboratory studies, obtaining history from patient or surrogate, development of treatment plan with patient or surrogate, examination of patient, ordering and performing treatments and interventions, ordering and review of radiographic studies, pulse oximetry, review of old charts and re-evaluation of patient's condition.        Labs Reviewed   CBC W/ AUTO DIFFERENTIAL - Abnormal; Notable for the following:        Result Value    RBC 3.57 (*)     Hemoglobin 11.2 (*)     Hematocrit 32.1 (*)     MCH 31.4 (*)     Platelets 132 (*)     Mono # 0.2 (*)     All other components within normal limits   COMPREHENSIVE METABOLIC PANEL - Abnormal; Notable for the following:     Sodium 135 (*)     CO2 22 (*)     BUN, Bld 30 (*)     ALT 9 (*)     All other components within normal limits   URINALYSIS - Abnormal; Notable for the following:     Specific Gravity, UA <=1.005 (*)     All other components within normal limits   OCCULT BLOOD X 1, STOOL - Abnormal; Notable for the following:     Occult Blood Positive (*)     All other components within normal limits   TROPONIN I - Abnormal; Notable for the following:     Troponin I 0.112 (*)     All other components within normal limits   PROTIME-INR   TYPE & SCREEN     EKG Readings: (Independently Interpreted)   Rhythm: Normal Sinus Rhythm. Heart Rate: 94. Ectopy: No Ectopy. Conduction: RBBB.                       Attending Attestation:             Attending ED Notes:   Discussed with GI fellow Dr. Mohr who recommends continue protonix gtt, serial h/h and NPO status    Consulted Dr. Neely for elevated troponin. He will see pt in am and recommended holding pletal and ASA for now.           ED Course     Clinical Impression:   The primary encounter diagnosis was Gastrointestinal hemorrhage with melena. A diagnosis of Malaise was also pertinent to this  visit.                           Antelmo Tran MD  08/19/17 [id number]

## 2017-08-20 ENCOUNTER — ANESTHESIA EVENT (OUTPATIENT)
Dept: ENDOSCOPY | Facility: HOSPITAL | Age: 67
DRG: 378 | End: 2017-08-20
Payer: MEDICARE

## 2017-08-20 LAB
ANION GAP SERPL CALC-SCNC: 7 MMOL/L
ANISOCYTOSIS BLD QL SMEAR: SLIGHT
BASOPHILS # BLD AUTO: 0.01 K/UL
BASOPHILS # BLD AUTO: 0.02 K/UL
BASOPHILS # BLD AUTO: 0.03 K/UL
BASOPHILS NFR BLD: 0.2 %
BASOPHILS NFR BLD: 0.3 %
BASOPHILS NFR BLD: 0.6 %
BUN SERPL-MCNC: 15 MG/DL
CALCIUM SERPL-MCNC: 9.2 MG/DL
CHLORIDE SERPL-SCNC: 108 MMOL/L
CO2 SERPL-SCNC: 23 MMOL/L
CREAT SERPL-MCNC: 0.7 MG/DL
DIFFERENTIAL METHOD: ABNORMAL
EOSINOPHIL # BLD AUTO: 0.1 K/UL
EOSINOPHIL NFR BLD: 1.4 %
EOSINOPHIL NFR BLD: 1.9 %
EOSINOPHIL NFR BLD: 1.9 %
ERYTHROCYTE [DISTWIDTH] IN BLOOD BY AUTOMATED COUNT: 14 %
ERYTHROCYTE [DISTWIDTH] IN BLOOD BY AUTOMATED COUNT: 14.1 %
ERYTHROCYTE [DISTWIDTH] IN BLOOD BY AUTOMATED COUNT: 14.2 %
EST. GFR  (AFRICAN AMERICAN): >60 ML/MIN/1.73 M^2
EST. GFR  (NON AFRICAN AMERICAN): >60 ML/MIN/1.73 M^2
ESTIMATED AVG GLUCOSE: 97 MG/DL
GLUCOSE SERPL-MCNC: 84 MG/DL
HBA1C MFR BLD HPLC: 5 %
HCT VFR BLD AUTO: 27.2 %
HCT VFR BLD AUTO: 27.6 %
HCT VFR BLD AUTO: 29 %
HGB BLD-MCNC: 9.3 G/DL
HGB BLD-MCNC: 9.4 G/DL
HGB BLD-MCNC: 9.7 G/DL
HYPOCHROMIA BLD QL SMEAR: ABNORMAL
LYMPHOCYTES # BLD AUTO: 1.3 K/UL
LYMPHOCYTES # BLD AUTO: 2 K/UL
LYMPHOCYTES # BLD AUTO: 2 K/UL
LYMPHOCYTES NFR BLD: 25.8 %
LYMPHOCYTES NFR BLD: 34.2 %
LYMPHOCYTES NFR BLD: 41 %
MCH RBC QN AUTO: 31 PG
MCH RBC QN AUTO: 31.6 PG
MCH RBC QN AUTO: 32 PG
MCHC RBC AUTO-ENTMCNC: 33.4 G/DL
MCHC RBC AUTO-ENTMCNC: 33.7 G/DL
MCHC RBC AUTO-ENTMCNC: 34.6 G/DL
MCV RBC AUTO: 92 FL
MCV RBC AUTO: 93 FL
MCV RBC AUTO: 95 FL
MONOCYTES # BLD AUTO: 0.4 K/UL
MONOCYTES NFR BLD: 6.8 %
MONOCYTES NFR BLD: 8.3 %
MONOCYTES NFR BLD: 8.8 %
NEUTROPHILS # BLD AUTO: 2.3 K/UL
NEUTROPHILS # BLD AUTO: 3.3 K/UL
NEUTROPHILS # BLD AUTO: 3.3 K/UL
NEUTROPHILS NFR BLD: 47.7 %
NEUTROPHILS NFR BLD: 57.3 %
NEUTROPHILS NFR BLD: 63.8 %
PLATELET # BLD AUTO: 122 K/UL
PLATELET # BLD AUTO: 131 K/UL
PLATELET # BLD AUTO: 174 K/UL
PLATELET BLD QL SMEAR: ABNORMAL
PMV BLD AUTO: 11.8 FL
PMV BLD AUTO: 11.9 FL
PMV BLD AUTO: 12.6 FL
POTASSIUM SERPL-SCNC: 4.1 MMOL/L
RBC # BLD AUTO: 2.94 M/UL
RBC # BLD AUTO: 3 M/UL
RBC # BLD AUTO: 3.07 M/UL
SODIUM SERPL-SCNC: 138 MMOL/L
TROPONIN I SERPL DL<=0.01 NG/ML-MCNC: 0.1 NG/ML
TROPONIN I SERPL DL<=0.01 NG/ML-MCNC: 0.11 NG/ML
WBC # BLD AUTO: 4.76 K/UL
WBC # BLD AUTO: 5.2 K/UL
WBC # BLD AUTO: 5.87 K/UL

## 2017-08-20 PROCEDURE — 11000001 HC ACUTE MED/SURG PRIVATE ROOM

## 2017-08-20 PROCEDURE — 36415 COLL VENOUS BLD VENIPUNCTURE: CPT

## 2017-08-20 PROCEDURE — 93005 ELECTROCARDIOGRAM TRACING: CPT

## 2017-08-20 PROCEDURE — 94761 N-INVAS EAR/PLS OXIMETRY MLT: CPT

## 2017-08-20 PROCEDURE — 25000003 PHARM REV CODE 250: Performed by: EMERGENCY MEDICINE

## 2017-08-20 PROCEDURE — 93010 ELECTROCARDIOGRAM REPORT: CPT | Mod: 77,,, | Performed by: INTERNAL MEDICINE

## 2017-08-20 PROCEDURE — 63600175 PHARM REV CODE 636 W HCPCS: Performed by: EMERGENCY MEDICINE

## 2017-08-20 PROCEDURE — 25000003 PHARM REV CODE 250: Performed by: STUDENT IN AN ORGANIZED HEALTH CARE EDUCATION/TRAINING PROGRAM

## 2017-08-20 PROCEDURE — 80048 BASIC METABOLIC PNL TOTAL CA: CPT

## 2017-08-20 PROCEDURE — 85025 COMPLETE CBC W/AUTO DIFF WBC: CPT

## 2017-08-20 PROCEDURE — 93010 ELECTROCARDIOGRAM REPORT: CPT | Mod: ,,, | Performed by: INTERNAL MEDICINE

## 2017-08-20 PROCEDURE — C9113 INJ PANTOPRAZOLE SODIUM, VIA: HCPCS | Performed by: EMERGENCY MEDICINE

## 2017-08-20 PROCEDURE — 84484 ASSAY OF TROPONIN QUANT: CPT

## 2017-08-20 RX ORDER — ACETAMINOPHEN 325 MG/1
650 TABLET ORAL ONCE
Status: COMPLETED | OUTPATIENT
Start: 2017-08-20 | End: 2017-08-20

## 2017-08-20 RX ADMIN — DEXTROSE 8 MG/HR: 50 INJECTION, SOLUTION INTRAVENOUS at 06:08

## 2017-08-20 RX ADMIN — DEXTROSE 8 MG/HR: 50 INJECTION, SOLUTION INTRAVENOUS at 11:08

## 2017-08-20 RX ADMIN — DEXTROSE 8 MG/HR: 50 INJECTION, SOLUTION INTRAVENOUS at 04:08

## 2017-08-20 RX ADMIN — DEXTROSE 8 MG/HR: 50 INJECTION, SOLUTION INTRAVENOUS at 09:08

## 2017-08-20 RX ADMIN — DEXTROSE 8 MG/HR: 50 INJECTION, SOLUTION INTRAVENOUS at 12:08

## 2017-08-20 RX ADMIN — ACETAMINOPHEN 650 MG: 325 TABLET ORAL at 06:08

## 2017-08-20 RX ADMIN — ATORVASTATIN CALCIUM 20 MG: 20 TABLET, FILM COATED ORAL at 09:08

## 2017-08-20 NOTE — PROGRESS NOTES
"[facility]    CC: black stool     HPI 67 y.o. female with pmh CAD s/p CABG, PAD s/p right BKA presents for acute onset persistent black stools associated with abdominal pain for 4 days. Patient reports experiencing headaches over the past month and taking NSAID powder medication more frequently over that time. She has been tolerating PO and not had any pain with PO intake. She then noticed black stool 4 days ago and has continued until admission. She has noticed fatigue has progressed over that time.     Patient with no BM overnight. She denies nausea, vomiting and abdominal pains.    Past Medical History    has a past medical history of Coronary artery disease; GERD (gastroesophageal reflux disease); Herpes simplex without mention of complication; Hyperlipidemia; Hypertension; and PAD (peripheral artery disease).      Review of Systems  General ROS: negative for - chills, fever or weight loss  Cardiovascular ROS: no chest pain or dyspnea on exertion  Gastrointestinal ROS: no pain, no n/v/d.     Physical Examination  /61 (Patient Position: Lying)   Pulse 72   Temp 97.7 °F (36.5 °C) (Oral)   Resp 18   Ht 5' 3" (1.6 m)   Wt 54.7 kg (120 lb 9.5 oz)   SpO2 100%   Breastfeeding? No   BMI 21.36 kg/m²   General appearance: alert, cooperative, no distress  HENT: Normocephalic, atraumatic, neck symmetrical, no nasal discharge   Lungs: clear to auscultation in all fields, symmetric chest wall expansion bilaterally, no wheeze, rale, or rhonchi  Heart: normal rate, regular rhythm without rub; palpable peripheral pulsesI   Abdomen: soft, non-tender, non-distended. Active bowel sounds, no ascites or rebound.  Extremities: RLE BKA, appears c/d/i; no clubbing, cyanosis, or edema  Neurologic: Alert and oriented X 3, normal strength, normal coordination    Labs:  Lab Results   Component Value Date    WBC 5.20 08/20/2017    HGB 9.7 (L) 08/20/2017    HCT 29.0 (L) 08/20/2017    MCV 95 08/20/2017     " 08/20/2017         Imaging:  No new imaging    Assessment:   67 yoF with pmh CAD, PAD presents for acute onset melena with anemia for 4 days. Patient also with hypertensive emergency causing type II NSTEMI. Patient with no chest pains, no hematemesis. Suspect PUD from NSAID use for headaches likely due to uncontrolled HTN, though could also be alternate etiology such as deulafoy lesion or AVM. Patient also with history of large polyps on prior colonoscopy, so could be right sided colonic origin.     Plan:  GI bleed   - 2 large bore PIV   - trending H/H. Transfusion goals per primary team   - continue PPI gtt   - continue holding pletal   - NPO at midnight, EGD tomorrow   - if EGD negative, plan for colonoscopy as inpatient. If upper GI source, outpatient colonoscopy     Case discussed with Dr. Bautista.     Russell Mohr MD  [facility], PGY IV  [phone]

## 2017-08-20 NOTE — H&P
[facility] Internal Medicine H&P    Admitting Team: Hospital Medicine Team B  Attending Physician: Dr. Serra  Resident: Dr. Soriano  Intern: Dr. Talley     Date of Admit: 8/19/2017    Chief Complaint     GI Bleeding (black stools for 4 days, feeling weak, + sob, denies chest pain. + lower abd pain) and Abdominal Pain   for 3 days    Subjective:      History of Present Illness:  Roma Dunbar is a 67 y.o.  female who  has a past medical history of Coronary artery disease; GERD (gastroesophageal reflux disease); Herpes simplex without mention of complication; Hyperlipidemia; Hypertension; and PAD (peripheral artery disease).. The patient presented to [facility] on 8/19/2017 with a primary complaint of GI Bleeding (black stools for 4 days, feeling weak, + sob, denies chest pain. + lower abd pain) and Abdominal Pain      Pt with hx CAD, s/p CABG, PAD (on pletal), HTN, HLD, right BKA with prosthesisThe patient was in their usual state of health until 3 days ago when she noted that her stools were black and also c/o generalized weakness and vague abdominal cramping.  Pt states that she had been taking some stanbacks and good powders for aches and pains, but denies taking any in the last couple of weeks.  She denies vomiting, fever, diarrhea, has been having one bowel movement per day.  She does endorse some dizziness on exertion, with mild shortness of breath, but no chest pain.     Past Medical History:  Past Medical History:   Diagnosis Date    Coronary artery disease     GERD (gastroesophageal reflux disease)     Herpes simplex without mention of complication     Hyperlipidemia     Hypertension     PAD (peripheral artery disease)        Past Surgical History:  Past Surgical History:   Procedure Laterality Date    BKA   mid 2012    Right side at shin level amputation. 2/2 PVD    CARDIAC SURGERY      CORONARY ARTERY BYPASS GRAFT      before Lindsey    HYSTERECTOMY         Allergies:  Review of  patient's allergies indicates:  No Known Allergies    Home Medications:  Prior to Admission medications    Medication Sig Start Date End Date Taking? Authorizing Provider   cilostazol (PLETAL) 50 MG Tab Take 50 mg by mouth 2 (two) times daily.   Yes Historical Provider, MD   aspirin 81 MG Chew Take 1 tablet (81 mg total) by mouth once daily. 9/28/16 9/28/17  Saji King MD   lisinopril-hydrochlorothiazide (PRINZIDE,ZESTORETIC) 20-12.5 mg per tablet TAKE 1 TABLET BY MOUTH TWICE DAILY 4/27/17   Rolly Ferro MD   loratadine (CLARITIN) 10 mg tablet Take 1 tablet (10 mg total) by mouth once daily. 11/11/14 8/24/16  Benoit Bender MD   metoprolol succinate (TOPROL-XL) 25 MG 24 hr tablet Take 1 tablet (25 mg total) by mouth once daily. 9/28/16 9/28/17  Saji King MD   oxybutynin (DITROPAN) 5 MG Tab Take 1 tablet (5 mg total) by mouth 2 (two) times daily. 6/5/13 8/24/16  Marixa Gutiérrez MD   simvastatin (ZOCOR) 20 MG tablet Take 1 tablet (20 mg total) by mouth every evening. 7/18/16   Rolly Ferro MD   acyclovir (ZOVIRAX) 800 MG Tab Take 1 tablet (800 mg total) by mouth 5 (five) times daily. 8/21/13 8/19/17  Alcira Ordoñez MD   cilostazol (PLETAL) 50 MG Tab TAKE 1 TABLET BY MOUTH TWICE DAILY 5/8/17 8/19/17  Rolly Ferro MD   etodolac (LODINE XL) 400 MG 24 hr tablet Take 1 tablet (400 mg total) by mouth daily as needed (avoid all other NSAID's). 1/11/17 8/19/17  Rolly Ferro MD       Family History:  Family History   Problem Relation Age of Onset    Hypertension Mother     Diabetes Mother     Colon cancer Maternal Grandmother        Social History:  Social History   Substance Use Topics    Smoking status: Current Some Day Smoker     Packs/day: 1.50     Years: 40.00     Types: Cigarettes    Smokeless tobacco: Never Used    Alcohol use Yes      Comment: beer on weekends       Review of Systems:  Pertinent items are noted in HPI. All other systems are reviewed and are negative.    Health Maintaince :  "  Primary Care Physician: Dr. Ferro    Immunizations:   TDap unkown  Flu 2017  Pna PCV 13 on 2015    Cancer Screening:  PAP: unk  MMG unk  Colonoscopy unk     Objective:   Last 24 Hour Vital Signs:  BP  Min: 120/57  Max: 214/90  Temp  Av.4 °F (36.9 °C)  Min: 98.4 °F (36.9 °C)  Max: 98.4 °F (36.9 °C)  Pulse  Av.2  Min: 72  Max: 113  Resp  Av.5  Min: 14  Max: 18  SpO2  Av %  Min: 100 %  Max: 100 %  Height  Av' 8" (172.7 cm)  Min: 5' 8" (172.7 cm)  Max: 5' 8" (172.7 cm)  Weight  Av.8 kg (123 lb)  Min: 55.8 kg (123 lb)  Max: 55.8 kg (123 lb)  Body mass index is 18.7 kg/m².  No intake/output data recorded.    Physical Examination:  General: Alert, Awake, Oriented x 3, No Acute Distress, cooperative  Head: normocephalic, atraumatic  Eyes: PERRL, EOMI, no scleral icterus, no conjunctival pallor  Nose: no tenderness to palpation, no drainage or erythema appreciated  Mouth and Throat: no lesions noted, moist mucus membranes  Neck: no lymphadenopathy appreciated, No carotid bruits  Respiratory: lungs clear to ascultation bilaterally, no accessory use of muscles   Cardiovascular: regular rate with regular rhythm, +KITTY, rubs, or S3, S4, normal apical impulse.  Gastrointestinal: +TTP epigastric; soft, otherwise nontender, nondistended,no rebound or guarding, normoactive bowel sounds.   Extremities: +Right BKA pulses 2+ in all extremities, no pitting edema, normal ROM   Neuro:  full strength even in all extremities, no sensory deficits.   Skin: no lesions, rashes, or breakdown.        Laboratory:  Most Recent Data:  CBC: Lab Results   Component Value Date    WBC 5.75 2017    HGB 9.3 (L) 2017    HCT 26.6 (L) 2017     (L) 2017    MCV 90 2017    RDW 14.0 2017     WBC Differential: 56 % N, 34 % Bands, 0 % L, 7.5 % M, 1.2 % Eo, 0.2 % Baso, no additional cells seen  BMP: Lab Results   Component Value Date     (L) 2017    K 3.6 2017    CL " 103 08/19/2017    CO2 22 (L) 08/19/2017    BUN 30 (H) 08/19/2017    CREATININE 0.8 08/19/2017    GLU 89 08/19/2017    CALCIUM 9.3 08/19/2017    MG 2.1 09/27/2011     LFTs: Lab Results   Component Value Date    PROT 7.2 08/19/2017    ALBUMIN 3.9 08/19/2017    BILITOT 0.4 08/19/2017    AST 14 08/19/2017    ALKPHOS 58 08/19/2017    ALT 9 (L) 08/19/2017     Coags:   Lab Results   Component Value Date    INR 1.1 08/19/2017     FLP: Lab Results   Component Value Date    CHOL 225 (H) 07/18/2016    HDL 67 07/18/2016    LDLCALC 140.0 07/18/2016    TRIG 90 07/18/2016    CHOLHDL 29.8 07/18/2016     DM: Lab Results   Component Value Date    HGBA1C 5.7 08/17/2015    HGBA1C 4.7 09/27/2011    HGBA1C 5.3 08/28/2011    LDLCALC 140.0 07/18/2016    CREATININE 0.8 08/19/2017     Thyroid: Lab Results   Component Value Date    TSH 0.826 07/18/2016    FREET4 0.98 01/28/2015     Anemia: No results found for: IRON, TIBC, FERRITIN, [id number], FOLATE  Cardiac: Lab Results   Component Value Date    TROPONINI 0.098 (H) 08/19/2017    BNP 91 08/28/2011     Urinalysis: Lab Results   Component Value Date    COLORU Yellow 08/19/2017    SPECGRAV <=1.005 (A) 08/19/2017    NITRITE Negative 08/19/2017    KETONESU Negative 08/19/2017    UROBILINOGEN Negative 08/19/2017    WBCUA 20-30 (A) 10/25/2011       Trended Lab Data:    Recent Labs  Lab 08/19/17  1355 08/19/17  1855   WBC 4.82 5.75   HGB 11.2* 9.3*   HCT 32.1* 26.6*   * 122*   MCV 90 90   RDW 13.9 14.0   *  --    K 3.6  --      --    CO2 22*  --    BUN 30*  --    CREATININE 0.8  --    GLU 89  --    PROT 7.2  --    ALBUMIN 3.9  --    BILITOT 0.4  --    AST 14  --    ALKPHOS 58  --    ALT 9*  --        Trended Cardiac Data:    Recent Labs  Lab 08/19/17  1355 08/19/17  1855   TROPONINI 0.112* 0.098*         Other Results:  EKG (my interpretation): unchanged from previous tracings, normal sinus rhythm, RBBB, no acute ischemic changes.    Radiology:  Imaging Results          X-Ray  Chest 1 View (Final result)     Abnormal  Result time 08/19/17 15:50:18    Final result by Zach Live MD (08/19/17 15:50:18)                 Impression:        1. Lateral left lung apex 4 mm indeterminate nodule. Correlation recommended with any prior imaging from outside facility if/when available. Further evaluation with elective/nonemergent CT thorax can be obtained, if 2 year stability cannot be confirmed.    2. Otherwise, no radiographic acute intrathoracic process seen on this single view.          EPIC notification system was activated.      Electronically signed by: ZACH LIVE MD, MD  Date:     08/19/17  Time:    15:50              Narrative:    COMPARISON: Chest radiograph most recent 10/24/11 and a CT thorax 1/28/15    FINDINGS: AP portable  view of the chest. Prior sternotomy changes. There is a 4 mm nodular density projected over the lateral left lung apex not definitively seen on previous studies. Otherwise, the bilateral lungs are well expanded without large consolidation.  No large pleural effusion or pneumothorax.  The heart and mediastinal contours are within normal limits for age, unchanged.  No acute osseous process seen.   PA and lateral views can be obtained.                               Assessment:     Roma Dunbar is a 67 y.o. female with:  Patient Active Problem List    Diagnosis Date Noted    Melena 08/19/2017    Gastrointestinal hemorrhage with melena 08/19/2017    Sacroiliitis 01/17/2017    Right lumbar radiculopathy 01/17/2017    Lumbar facet arthropathy 01/17/2017    Insomnia 01/11/2017    Needs flu shot 01/11/2017    Lipoma of torso 01/11/2017    Ischemic chest pain 09/28/2016    Midline low back pain without sciatica 08/09/2016    Abnormal mammogram 08/09/2016    Hematuria 08/09/2016    Thrombocytopenia 08/09/2016    Tobacco dependence 07/18/2016    History of amputation of right lower extremity through tibia and fibula 07/18/2016    PVD (peripheral vascular  disease) 07/18/2016    Encounter for screening mammogram for malignant neoplasm of breast  07/18/2016    Hyperlipemia 07/18/2016    Coronary artery disease involving native coronary artery without angina pectoris 07/18/2016    Pressure sore 09/14/2015    Wrist pain 08/14/2015    H/O abdominal abscess 08/14/2015    Tobacco abuse counseling 06/15/2015    Centrilobular emphysema 01/29/2015    Urinary incontinence, urge 06/05/2013    Stump pain 05/30/2013    Hyperlipidemia 05/20/2013    Hypertension         Plan:     Melena, GI Bleed:  -- history and symptoms make LGIB more likely, no BRB  -- first troponin elevated at 0.1, but has trended down to 0.09   -- pt is typed and crossed, will transfuse to a goal of 7-8  -- GI consulted by ED who states continue protonix drip, npo after midnight  -- iron studies ordered and pending  -- counseled on cessation of NSAIDs  -- lactate normal, VS ok with no tachycardia or hypotension  -- no known hx hep c, acute hep panel pending  -- no known liver disease, no indication for octreotide at this time.     HTN:   -- will hold BP meds in the setting of GIB    HLD:   -- not on statin at home, lipid panel pending  -- will start lipitor    CAD s/p CABG:  -- no active chest pain, elevated trop likely due to demand ischemia  -- will trend troponins and EKG's  -- hold asa in the setting of GIB    Tobacco dependance:  -- counseled on cessation    HCM  -- tdap utd, will get remaining vax history  -- mammo, pap, c scope unknown    PPx:   --SCD's, protonix drip      Code Status:     Full    Soraya Soriano MD  [facility] Hospital Medicine Team B  [facility] Internal Medicine / Emergency Medicine [id number]  8/19/2017 7:49 PM    [facility] Medicine Hospitalist Pager numbers:   [facility] Hospitalist Medicine Team A (Bob/Kiesha): [phone]  [facility] Hospitalist Medicine Team B (Ponce/Mohinder):  [phone]

## 2017-08-20 NOTE — PLAN OF CARE
Problem: Patient Care Overview  Goal: Plan of Care Review  Outcome: Ongoing (interventions implemented as appropriate)  Pt on RA with sats of 100%. Will continue to monitor.

## 2017-08-20 NOTE — ED NOTES
Educated pt. on medication indication, AE, SE, reactions and expected outcomes. Pt. verbalizes understanding. Agrees to treatment plan. Denies allergy. Will continue to monitor.

## 2017-08-20 NOTE — CONSULTS
[facility]    CC: black stool    HPI 67 y.o. female with pmh CAD s/p CABG, PAD s/p right BKA presents for acute onset persistent black stools associated with abdominal pain for 4 days. Patient reports experiencing headaches over the past month and taking NSAID powder medication more frequently over that time. She has been tolerating PO and not had any pain with PO intake. She then noticed black stool 4 days ago and has continued until today. She has noticed fatigue has progressed over that time. She denies nausea and vomiting, denies increased BM frequency. She notes BLQ pain associated with Bm. Otherwise, she denies any other pains.    Upon arrival to Ed, her BP was 214/90. ED physician reports melena on rectal exam.    Past Medical History   has a past medical history of Coronary artery disease; GERD (gastroesophageal reflux disease); Herpes simplex without mention of complication; Hyperlipidemia; Hypertension; and PAD (peripheral artery disease).    Past Surgical History   has a past surgical history that includes Hysterectomy; BKA ( mid 2012); Cardiac surgery; and Coronary artery bypass graft.    Social History  Social History   Substance Use Topics    Smoking status: Current Some Day Smoker     Packs/day: 1.50     Years: 40.00     Types: Cigarettes    Smokeless tobacco: Never Used    Alcohol use Yes      Comment: beer on weekends       Family History  Family History   Problem Relation Age of Onset    Hypertension Mother     Diabetes Mother     Colon cancer Maternal Grandmother        Review of Systems  General ROS: negative for chills, fever or weight loss  Psychological ROS: negative for hallucination, depression or suicidal ideation  Ophthalmic ROS: negative for blurry vision, photophobia or eye pain  ENT ROS: negative for epistaxis, sore throat or rhinorrhea  Respiratory ROS: chronic cough, no wheezing, shortness of breath  Cardiovascular ROS: no chest pain, dyspnea at rest. No swelling in  "extremities  Gastrointestinal ROS: pain and melena per HPI. No n/v.  Genito-Urinary ROS: no dysuria, trouble voiding, or hematuria  Musculoskeletal ROS: negative for gait disturbance or muscular weakness  Neurological ROS: no syncope or seizures; no ataxia  Dermatological ROS: negative for pruritis, rash and jaundice    Physical Examination  BP (!) 129/50 (BP Location: Left arm, Patient Position: Lying)   Pulse 75   Temp 98.4 °F (36.9 °C) (Oral)   Resp 14   Ht 5' 8" (1.727 m)   Wt 55.8 kg (123 lb)   SpO2 100%   BMI 18.70 kg/m²   General appearance: alert, cooperative, no distress  HENT: Normocephalic, atraumatic, neck symmetrical, no nasal discharge   Eyes: conjunctivae/corneas clear, PERRL, EOM's intact  Lungs: clear to auscultation in all fields, no dullness to percussion bilaterally, no wheeze  Heart: normal rate, regular rhythm without rub; palpable peripheral pulses  Abdomen: soft, non-tender, hypoactive bowel sounds. No ascites, no organomegaly.  Extremities: extremities symmetric; no clubbing, cyanosis, or edema  Integument: Skin color, texture, turgor normal; no rashes; hair distrubution normal  Neurologic: Alert and oriented X 3, normal strength, normal coordination  Psychiatric: no pressured speech; normal affect; no evidence of impaired cognition     Labs:  Lab Results   Component Value Date    WBC 5.75 08/19/2017    HGB 9.3 (L) 08/19/2017    HCT 26.6 (L) 08/19/2017    MCV 90 08/19/2017     (L) 08/19/2017     Lab Results   Component Value Date    INR 1.1 08/19/2017    INR 1.1 08/19/2017    INR 1.0 09/28/2016         Imaging:  none      Colonoscopy in March and June 2010 -- large sessile polyp in proximal transverse colon, resected successfully in 2 successive colonoscopies. Large pedunculated polyp in distal descending colon, resected successfully. Proximal sigmoid semi-sessile polyp resected successfully. Recommendations to follow up for colonoscopy in 1 year.    Assessment: 67 yoF with pmh " CAD, PAD presents for acute onset melena with anemia for 4 days. Patient also with hypertensive emergency causing type II NSTEMI. Patient with no chest pains, no hematemesis. Suspect PUD from NSAID use as cause of symptoms, though could also be alternate etiology such as deulafoy lesion or AVM. Patient also with history of large polyps on prior colonoscopy, so could be right sided colonic origin.    Plan:  GI bleed   - 2 large bore PIV   - trending H/H. Transfusion goals per primary team   - continue PPI gtt   - no signs of cirrhosis. Will f/u HCV ab   - NPO at midnight, possible EGD tomorrow vs 8/21   - if EGD negative, plan for colonoscopy as inpatient. If upper GI source, outpatient colonoscopy    Case discussed with Dr. Bautista.    Russell Mohr MD  [facility], PGY IV  [phone]

## 2017-08-20 NOTE — PROGRESS NOTES
Patient complains of a headache, Paged hospitalitis on call. Dr. DAT Peck returned page and said he would order Tylenol for patient.

## 2017-08-20 NOTE — ED NOTES
Informed [facility] admit team, Dr. Saucedo, of pt.'s most recent lab results and vitals. Dr. Saucedo verbalizes understanding. States to continue to monitor pt, no additional orders, keep pt. NPO at present time until morning. GI was consulted by admit team and  GI will evaluate pt. in the morning for possible scope. Updated Kaci RN of Dr. Saucedo's updates. Kaci verbalizes understanding.

## 2017-08-21 ENCOUNTER — ANESTHESIA (OUTPATIENT)
Dept: ENDOSCOPY | Facility: HOSPITAL | Age: 67
DRG: 378 | End: 2017-08-21
Payer: MEDICARE

## 2017-08-21 VITALS
TEMPERATURE: 97 F | HEIGHT: 63 IN | WEIGHT: 120.56 LBS | SYSTOLIC BLOOD PRESSURE: 176 MMHG | BODY MASS INDEX: 21.36 KG/M2 | HEART RATE: 68 BPM | OXYGEN SATURATION: 99 % | RESPIRATION RATE: 18 BRPM | DIASTOLIC BLOOD PRESSURE: 73 MMHG

## 2017-08-21 LAB
BASOPHILS # BLD AUTO: 0.02 K/UL
BASOPHILS NFR BLD: 0.4 %
DIFFERENTIAL METHOD: ABNORMAL
EOSINOPHIL # BLD AUTO: 0.1 K/UL
EOSINOPHIL NFR BLD: 1.2 %
ERYTHROCYTE [DISTWIDTH] IN BLOOD BY AUTOMATED COUNT: 14.2 %
HCT VFR BLD AUTO: 29.2 %
HGB BLD-MCNC: 10 G/DL
LYMPHOCYTES # BLD AUTO: 1.7 K/UL
LYMPHOCYTES NFR BLD: 32.4 %
MCH RBC QN AUTO: 31.5 PG
MCHC RBC AUTO-ENTMCNC: 34.2 G/DL
MCV RBC AUTO: 92 FL
MONOCYTES # BLD AUTO: 0.4 K/UL
MONOCYTES NFR BLD: 7.7 %
NEUTROPHILS # BLD AUTO: 3 K/UL
NEUTROPHILS NFR BLD: 58.1 %
PLATELET # BLD AUTO: 137 K/UL
PMV BLD AUTO: 11.3 FL
RBC # BLD AUTO: 3.17 M/UL
WBC # BLD AUTO: 5.19 K/UL

## 2017-08-21 PROCEDURE — 25000003 PHARM REV CODE 250: Performed by: NURSE ANESTHETIST, CERTIFIED REGISTERED

## 2017-08-21 PROCEDURE — C9113 INJ PANTOPRAZOLE SODIUM, VIA: HCPCS | Performed by: EMERGENCY MEDICINE

## 2017-08-21 PROCEDURE — 37000009 HC ANESTHESIA EA ADD 15 MINS: Performed by: INTERNAL MEDICINE

## 2017-08-21 PROCEDURE — 0DB68ZX EXCISION OF STOMACH, VIA NATURAL OR ARTIFICIAL OPENING ENDOSCOPIC, DIAGNOSTIC: ICD-10-PCS | Performed by: INTERNAL MEDICINE

## 2017-08-21 PROCEDURE — 85025 COMPLETE CBC W/AUTO DIFF WBC: CPT

## 2017-08-21 PROCEDURE — 88305 TISSUE EXAM BY PATHOLOGIST: CPT | Performed by: PATHOLOGY

## 2017-08-21 PROCEDURE — 36415 COLL VENOUS BLD VENIPUNCTURE: CPT

## 2017-08-21 PROCEDURE — 88305 TISSUE EXAM BY PATHOLOGIST: CPT | Mod: 26,,, | Performed by: PATHOLOGY

## 2017-08-21 PROCEDURE — 25000003 PHARM REV CODE 250: Performed by: EMERGENCY MEDICINE

## 2017-08-21 PROCEDURE — 63600175 PHARM REV CODE 636 W HCPCS: Performed by: NURSE ANESTHETIST, CERTIFIED REGISTERED

## 2017-08-21 PROCEDURE — 37000008 HC ANESTHESIA 1ST 15 MINUTES: Performed by: INTERNAL MEDICINE

## 2017-08-21 PROCEDURE — 99223 1ST HOSP IP/OBS HIGH 75: CPT | Mod: ,,, | Performed by: INTERNAL MEDICINE

## 2017-08-21 PROCEDURE — 27201012 HC FORCEPS, HOT/COLD, DISP: Performed by: INTERNAL MEDICINE

## 2017-08-21 PROCEDURE — 63600175 PHARM REV CODE 636 W HCPCS: Performed by: EMERGENCY MEDICINE

## 2017-08-21 PROCEDURE — 43239 EGD BIOPSY SINGLE/MULTIPLE: CPT | Performed by: INTERNAL MEDICINE

## 2017-08-21 RX ORDER — PROPOFOL 10 MG/ML
VIAL (ML) INTRAVENOUS
Status: DISCONTINUED | OUTPATIENT
Start: 2017-08-21 | End: 2017-08-21

## 2017-08-21 RX ORDER — HYDROMORPHONE HYDROCHLORIDE 2 MG/ML
0.5 INJECTION, SOLUTION INTRAMUSCULAR; INTRAVENOUS; SUBCUTANEOUS EVERY 5 MIN PRN
Status: ACTIVE | OUTPATIENT
Start: 2017-08-21 | End: 2017-08-21

## 2017-08-21 RX ORDER — LIDOCAINE HCL/PF 100 MG/5ML
SYRINGE (ML) INTRAVENOUS
Status: DISCONTINUED | OUTPATIENT
Start: 2017-08-21 | End: 2017-08-21

## 2017-08-21 RX ORDER — SODIUM CHLORIDE 9 MG/ML
INJECTION, SOLUTION INTRAVENOUS CONTINUOUS PRN
Status: DISCONTINUED | OUTPATIENT
Start: 2017-08-21 | End: 2017-08-21

## 2017-08-21 RX ORDER — SODIUM CHLORIDE 9 MG/ML
INJECTION, SOLUTION INTRAVENOUS CONTINUOUS
Status: DISCONTINUED | OUTPATIENT
Start: 2017-08-21 | End: 2017-08-21 | Stop reason: HOSPADM

## 2017-08-21 RX ORDER — SODIUM CHLORIDE 0.9 % (FLUSH) 0.9 %
3 SYRINGE (ML) INJECTION
Status: DISCONTINUED | OUTPATIENT
Start: 2017-08-21 | End: 2017-08-21 | Stop reason: HOSPADM

## 2017-08-21 RX ORDER — SODIUM CHLORIDE 0.9 % (FLUSH) 0.9 %
3 SYRINGE (ML) INJECTION EVERY 8 HOURS
Status: DISCONTINUED | OUTPATIENT
Start: 2017-08-21 | End: 2017-08-21 | Stop reason: HOSPADM

## 2017-08-21 RX ORDER — ONDANSETRON 2 MG/ML
4 INJECTION INTRAMUSCULAR; INTRAVENOUS ONCE AS NEEDED
Status: DISCONTINUED | OUTPATIENT
Start: 2017-08-21 | End: 2017-08-21 | Stop reason: HOSPADM

## 2017-08-21 RX ORDER — FENTANYL CITRATE 50 UG/ML
INJECTION, SOLUTION INTRAMUSCULAR; INTRAVENOUS
Status: DISCONTINUED | OUTPATIENT
Start: 2017-08-21 | End: 2017-08-21

## 2017-08-21 RX ORDER — PANTOPRAZOLE SODIUM 40 MG/1
40 FOR SUSPENSION ORAL DAILY
Qty: 30 PACKET | Refills: 11 | Status: SHIPPED | OUTPATIENT
Start: 2017-08-21 | End: 2018-06-05

## 2017-08-21 RX ADMIN — FENTANYL CITRATE 50 MCG: 50 INJECTION, SOLUTION INTRAMUSCULAR; INTRAVENOUS at 09:08

## 2017-08-21 RX ADMIN — PROPOFOL 20 MG: 10 INJECTION, EMULSION INTRAVENOUS at 09:08

## 2017-08-21 RX ADMIN — LIDOCAINE HYDROCHLORIDE 50 MG: 20 INJECTION, SOLUTION INTRAVENOUS at 09:08

## 2017-08-21 RX ADMIN — SODIUM CHLORIDE: 0.9 INJECTION, SOLUTION INTRAVENOUS at 09:08

## 2017-08-21 RX ADMIN — DEXTROSE 8 MG/HR: 50 INJECTION, SOLUTION INTRAVENOUS at 03:08

## 2017-08-21 NOTE — ANESTHESIA POSTPROCEDURE EVALUATION
"Anesthesia Post Evaluation    Patient: Roma Dunbar    Procedure(s) Performed: Procedure(s) (LRB):  ESOPHAGOGASTRODUODENOSCOPY (EGD) (N/A)    Final Anesthesia Type: MAC  Patient location during evaluation: PACU  Patient participation: Yes- Able to Participate  Level of consciousness: awake and alert  Post-procedure vital signs: reviewed and stable  Pain management: adequate  Airway patency: patent  PONV status at discharge: No PONV  Anesthetic complications: no      Cardiovascular status: blood pressure returned to baseline and hemodynamically stable  Respiratory status: unassisted  Hydration status: euvolemic  Follow-up not needed.        Visit Vitals  BP (!) 142/58   Pulse (!) 58   Temp 36.4 °C (97.5 °F) (Oral)   Resp (!) 27   Ht 5' 3" (1.6 m)   Wt 54.7 kg (120 lb 9.5 oz)   SpO2 100%   Breastfeeding? No   BMI 21.36 kg/m²       Pain/Luis Score: Pain Assessment Performed: Yes (8/21/2017 10:07 AM)  Presence of Pain: denies (8/21/2017 10:07 AM)  Pain Rating Prior to Med Admin: 7 (8/20/2017  6:04 AM)  Pain Rating Post Med Admin: 0 (8/20/2017  7:05 AM)  Luis Score: 9 (8/21/2017 10:07 AM)      "

## 2017-08-21 NOTE — ANESTHESIA PREPROCEDURE EVALUATION
08/20/2017  Roma Dunbar is a 67 y.o., female for EGD    Wt Readings from Last 3 Encounters:   08/19/17 54.7 kg (120 lb 9.5 oz)   01/17/17 56.7 kg (125 lb)   01/11/17 59.4 kg (130 lb 15.3 oz)     Temp Readings from Last 3 Encounters:   08/20/17 36.7 °C (98.1 °F) (Oral)   09/28/16 36.8 °C (98.2 °F) (Oral)   02/17/16 36.8 °C (98.3 °F) (Oral)     BP Readings from Last 3 Encounters:   08/20/17 130/72   01/17/17 108/60   01/11/17 (!) 164/90     Pulse Readings from Last 3 Encounters:   08/20/17 67   01/17/17 62   01/11/17 83     Past Medical History:   Diagnosis Date    Coronary artery disease     GERD (gastroesophageal reflux disease)     Herpes simplex without mention of complication     Hyperlipidemia     Hypertension     PAD (peripheral artery disease)      Past Surgical History:   Procedure Laterality Date    BKA   mid 2012    Right side at shin level amputation. 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        Review of Systems    Lab Results   Component Value Date    WBC 4.76 08/20/2017    HGB 9.3 (L) 08/20/2017    HCT 27.6 (L) 08/20/2017    MCV 92 08/20/2017     (L) 08/20/2017       Chemistry        Component Value Date/Time     08/20/2017 0620    K 4.1 08/20/2017 0620     08/20/2017 0620    CO2 23 08/20/2017 0620    BUN 15 08/20/2017 0620    CREATININE 0.7 08/20/2017 0620    GLU 84 08/20/2017 0620        Component Value Date/Time    CALCIUM 9.2 08/20/2017 0620    ALKPHOS 58 08/19/2017 1355    AST 14 08/19/2017 1355    ALT 9 (L) 08/19/2017 1355    BILITOT 0.4 08/19/2017 1355    ESTGFRAFRICA >60 08/20/2017 0620    EGFRNONAA >60 08/20/2017 0620            Physical Exam  General:  Well nourished    Airway/Jaw/Neck:  Airway Findings: Mouth Opening: Normal Tongue: Normal  General Airway Assessment: Adult  Mallampati: II  Improves to II with phonation.  TM Distance: Normal, at least 6 cm       Chest/Lungs:  Chest/Lungs Findings: Normal Respiratory Rate     Heart/Vascular:  Heart Findings: Rate: Normal  Rhythm: Regular Rhythm  Sounds: Normal        Mental Status:  Mental Status Findings:  Cooperative         Anesthesia Plan  Type of Anesthesia, risks & benefits discussed:  Anesthesia Type:  MAC  Patient's Preference:   Intra-op Monitoring Plan:   Intra-op Monitoring Plan Comments:   Post Op Pain Control Plan:   Post Op Pain Control Plan Comments:   Induction:    Beta Blocker:         Informed Consent: Patient understands risks and agrees with Anesthesia plan.  Questions answered. Anesthesia consent signed with patient.  ASA Score: 3     Day of Surgery Review of History & Physical:            Ready For Surgery From Anesthesia Perspective.

## 2017-08-21 NOTE — CONSULTS
[facility]  Cardiology  Consult Note    Patient Name: Roma Dunbar  MRN: [id number]  Admission Date: 8/19/2017  Hospital Length of Stay: 2 days  Code Status: Full Code   Attending Provider: Son Serra MD   Consulting Provider: Kirill Reynoso NP  Primary Care Physician: Rolly Ferro MD  Principal Problem:Melena    Patient information was obtained from patient, past medical records and ER records.     Inpatient consult to [facility]  Consult performed by: KIRILL REYNOSO  Consult ordered by: ANNETTE LEES  Reason for consult: elevated trop        Subjective:     Chief Complaint:  Black, tarry stool      HPI:   Roma Dunbar is a 67 y.o.  female  with CAD; GERD; Hyperlipidemia; HTN; and PAD s/p right BKA with prosthesis who presented to the ED on 8/19/2017 with a primary complaint of black tarry stools for 4 days. She associated generalized weakness, SOB, and abd pain. Patient reports heavy asa use with taking some stanbacks and goody powders for aches and pains. She denies vomiting, fever, diarrhea, has been having one bowel movement per day. Troponin mildly elevated at 0.098, .0110, 0.10. Cardiology was consulted for elevated troponin. ECG notes SR without acute ischemic changes. HH 11/32 initially and is down to 9/29 this AM    Past Medical History:   Diagnosis Date    Coronary artery disease     GERD (gastroesophageal reflux disease)     Herpes simplex without mention of complication     Hyperlipidemia     Hypertension     PAD (peripheral artery disease)        Past Surgical History:   Procedure Laterality Date    BKA   mid 2012    Right side at shin level amputation. 2/2 PVD    CARDIAC SURGERY      CORONARY ARTERY BYPASS GRAFT      before Lindsey    HYSTERECTOMY         Review of patient's allergies indicates:  No Known Allergies    No current facility-administered medications on file prior to encounter.      Current Outpatient Prescriptions on File Prior to  Encounter   Medication Sig    aspirin 81 MG Chew Take 1 tablet (81 mg total) by mouth once daily.    lisinopril-hydrochlorothiazide (PRINZIDE,ZESTORETIC) 20-12.5 mg per tablet TAKE 1 TABLET BY MOUTH TWICE DAILY    loratadine (CLARITIN) 10 mg tablet Take 1 tablet (10 mg total) by mouth once daily.    metoprolol succinate (TOPROL-XL) 25 MG 24 hr tablet Take 1 tablet (25 mg total) by mouth once daily.    oxybutynin (DITROPAN) 5 MG Tab Take 1 tablet (5 mg total) by mouth 2 (two) times daily.    simvastatin (ZOCOR) 20 MG tablet Take 1 tablet (20 mg total) by mouth every evening.     Family History     Problem Relation (Age of Onset)    Colon cancer Maternal Grandmother    Diabetes Mother    Hypertension Mother        Social History Main Topics    Smoking status: Current Some Day Smoker     Packs/day: 1.50     Years: 40.00     Types: Cigarettes    Smokeless tobacco: Never Used    Alcohol use Yes      Comment: beer on weekends    Drug use: No    Sexual activity: Not on file     Review of Systems   Constitution: Positive for malaise/fatigue. Negative for weakness.   HENT: Negative.    Eyes: Negative.    Cardiovascular: Negative for chest pain, dyspnea on exertion, leg swelling, near-syncope, orthopnea, palpitations, paroxysmal nocturnal dyspnea and syncope.   Respiratory: Positive for shortness of breath.    Endocrine: Negative.    Hematologic/Lymphatic: Negative.    Skin: Negative.    Gastrointestinal: Positive for melena.   Genitourinary: Negative.    Neurological: Negative.    Psychiatric/Behavioral: Negative.    Allergic/Immunologic: Negative.      Objective:     Vital Signs (Most Recent):  Temp: 97.3 °F (36.3 °C) (08/21/17 1204)  Pulse: 68 (08/21/17 1204)  Resp: 18 (08/21/17 1204)  BP: (!) 176/73 (08/21/17 1204)  SpO2: 100 % (08/21/17 1015) Vital Signs (24h Range):  Temp:  [96.7 °F (35.9 °C)-98.3 °F (36.8 °C)] 97.3 °F (36.3 °C)  Pulse:  [58-82] 68  Resp:  [16-27] 18  SpO2:  [96 %-100 %] 100 %  BP:  ()/(47-92) 176/73     Weight: 54.7 kg (120 lb 9.5 oz)  Body mass index is 21.36 kg/m².    SpO2: 100 %  O2 Device (Oxygen Therapy): room air      Intake/Output Summary (Last 24 hours) at 08/21/17 1335  Last data filed at 08/21/17 0932   Gross per 24 hour   Intake          1031.67 ml   Output                0 ml   Net          1031.67 ml       Lines/Drains/Airways     Airway                 Airway - Non-Surgical 08/21/17 0914 Nasal Cannula less than 1 day          Peripheral Intravenous Line                 Peripheral IV - Single Lumen 08/19/17 Left Hand 2 days         Peripheral IV - Single Lumen 08/21/17 0911 Left Forearm less than 1 day                Physical Exam   Constitutional: She is oriented to person, place, and time. She appears well-developed and well-nourished. No distress.   HENT:   Head: Normocephalic and atraumatic.   Eyes: Right eye exhibits no discharge. Left eye exhibits no discharge.   Neck: No JVD present.   Cardiovascular: Normal rate and regular rhythm.  Exam reveals no gallop and no friction rub.    No murmur heard.  Pulmonary/Chest: Effort normal and breath sounds normal.   Abdominal: Soft. Bowel sounds are normal. She exhibits no distension. There is no tenderness.   Musculoskeletal: She exhibits no edema.   Neurological: She is alert and oriented to person, place, and time.   Skin: Skin is warm and dry. She is not diaphoretic.   Psychiatric: She has a normal mood and affect. Her behavior is normal. Judgment and thought content normal.       Significant Labs:   BMP:   Recent Labs  Lab 08/19/17  1355 08/20/17  0620   GLU 89 84   * 138   K 3.6 4.1    108   CO2 22* 23   BUN 30* 15   CREATININE 0.8 0.7   CALCIUM 9.3 9.2   , CMP   Recent Labs  Lab 08/19/17  1355 08/20/17  0620   * 138   K 3.6 4.1    108   CO2 22* 23   GLU 89 84   BUN 30* 15   CREATININE 0.8 0.7   CALCIUM 9.3 9.2   PROT 7.2  --    ALBUMIN 3.9  --    BILITOT 0.4  --    ALKPHOS 58  --    AST 14  --     ALT 9*  --    ANIONGAP 10 7*   ESTGFRAFRICA >60 >60   EGFRNONAA >60 >60   , CBC   Recent Labs  Lab 08/20/17  0620 08/20/17  1939 08/21/17  0842   WBC 5.20 4.76 5.19   HGB 9.7* 9.3* 10.0*   HCT 29.0* 27.6* 29.2*    131* 137*   , INR   Recent Labs  Lab 08/19/17  1355 08/19/17  1855   INR 1.1 1.1   , Lipid Panel   Recent Labs  Lab 08/19/17  2019   CHOL 152   HDL 39*   LDLCALC 95.6   TRIG 87   CHOLHDL 25.7   , Troponin   Recent Labs  Lab 08/19/17  1855 08/19/17  2302 08/20/17  0620   TROPONINI 0.098* 0.100* 0.110*    and All pertinent lab results from the last 24 hours have been reviewed.    Significant Imaging: Echocardiogram:   2D echo with color flow doppler:   Results for orders placed or performed during the hospital encounter of 09/16/16   2D Echo w/ Color Flow Doppler   Result Value Ref Range    EF 60 55 - 65    Diastolic Dysfunction Yes (A)     Aortic Valve Regurgitation MILD     Aortic Valve Stenosis MILD (A)     Est. PA Systolic Pressure 27.21     Pericardial Effusion NONE     Tricuspid Valve Regurgitation MILD     and X-Ray: CXR: X-Ray Chest 1 View (CXR):   Results for orders placed or performed during the hospital encounter of 08/19/17   X-Ray Chest 1 View    Narrative    COMPARISON: Chest radiograph most recent 10/24/11 and a CT thorax 1/28/15    FINDINGS: AP portable  view of the chest. Prior sternotomy changes. There is a 4 mm nodular density projected over the lateral left lung apex not definitively seen on previous studies. Otherwise, the bilateral lungs are well expanded without large consolidation.  No large pleural effusion or pneumothorax.  The heart and mediastinal contours are within normal limits for age, unchanged.  No acute osseous process seen.   PA and lateral views can be obtained.    Impression    1. Lateral left lung apex 4 mm indeterminate nodule. Correlation recommended with any prior imaging from outside facility if/when available. Further evaluation with elective/nonemergent  CT thorax can be obtained, if 2 year stability cannot be confirmed.    2. Otherwise, no radiographic acute intrathoracic process seen on this single view.          EPIC notification system was activated.      Electronically signed by: ZACH HADLEY MD, MD  Date:     08/19/17  Time:    15:50      Assessment and Plan:     Gastrointestinal hemorrhage with melena    Presented with dark and tarry stools which began on 08/16/2017  In setting of heavy asa use with Goodys headache powders  For upper endoscopy today with possible colonoscopy tomorrow   11/32 on admission down to 9/29 today          Coronary artery disease involving native coronary artery without angina pectoris    [facility] 09/2016:     - Left Main Coronary Artery:             The LM is normal. There is MIKO 3 flow.     - Left Anterior Descending Artery:             The proximal LAD has a 80% stenosis. There is MIKO 3 flow.             The mid LAD has a 90% stenosis. There is MIKO 3 flow. Comptetitve flow noted distal to lesion from LIMA-LAD     - Left Circumflex Artery:             The ostial LCX has a 60% stenosis. There is MIKO 3 flow.             The mid LCX has a 80% stenosis. There is MIKO 3 flow. Small vessel and no comptetive flow noted.      - Right Coronary Artery:             The mid RCA has luminal irregularities has a 40% stenosis. There is MIKO 3 flow. Distally supplies collaterals to left sided circulation     - SVG To UNKNOWN:             The ostial SVG to UNKNOWN is occluded. There is MIKO 0 flow. Surgical marker on the aorta and the graft is likely to an OM     - LIMA To LAD:             The LIMA to LAD is normal. There is MIKO 3 flow.     - Common Femoral Artery:             The right CFA is normal.     - Superficial Femoral Artery:             The left ostial SFA has chronic total occlusion. There is MIKO 0 flow.  Patient without chest pain or acute ischemic changes on ECG  Troponin mildly elevated at 0.098, 0.11, 0.1 felt to be demand in  setting of GIB HH 11/32 08/20/2017 down to 9/29 this AM; for EGD today and possible colonoscopy tomorrow  Takes asa, BB, ACEI, statin at home- recommend resumption once stable from GI perspective.   Previously with normal EF; repeat echo pending         Hypertension    SBP 120s-160s; recommend resumption of home meds            VTE Risk Mitigation         Ordered     Medium Risk of VTE  Once      08/19/17 1824     Reason for no Mechanical VTE Prophylaxis  Once      08/19/17 1824     Place sequential compression device  Until discontinued      08/19/17 1824     Place KYLEE hose  Until discontinued      08/19/17 1824          Thank you for your consult. I will follow-up with patient. Please contact us if you have any additional questions.    Kirill Miranda, BLAKE  Cardiology   [facility]

## 2017-08-21 NOTE — ASSESSMENT & PLAN NOTE
[facility] 09/2016:     - Left Main Coronary Artery:             The LM is normal. There is MIKO 3 flow.     - Left Anterior Descending Artery:             The proximal LAD has a 80% stenosis. There is MIKO 3 flow.             The mid LAD has a 90% stenosis. There is MIKO 3 flow. Comptetitve flow noted distal to lesion from LIMA-LAD     - Left Circumflex Artery:             The ostial LCX has a 60% stenosis. There is MIKO 3 flow.             The mid LCX has a 80% stenosis. There is MIKO 3 flow. Small vessel and no comptetive flow noted.      - Right Coronary Artery:             The mid RCA has luminal irregularities has a 40% stenosis. There is MIKO 3 flow. Distally supplies collaterals to left sided circulation     - SVG To UNKNOWN:             The ostial SVG to UNKNOWN is occluded. There is MIKO 0 flow. Surgical marker on the aorta and the graft is likely to an OM     - LIMA To LAD:             The LIMA to LAD is normal. There is MIKO 3 flow.     - Common Femoral Artery:             The right CFA is normal.     - Superficial Femoral Artery:             The left ostial SFA has chronic total occlusion. There is MIKO 0 flow.  Patient without chest pain or acute ischemic changes on ECG  Troponin mildly elevated at 0.098, 0.11, 0.1 felt to be demand in setting of GIB HH 11/32 08/20/2017 down to 9/29 this AM; for EGD today and possible colonoscopy tomorrow  Takes asa, BB, ACEI, statin at home- recommend resumption once stable from GI perspective.   Previously with normal EF; repeat echo pending

## 2017-08-21 NOTE — TRANSFER OF CARE
"Anesthesia Transfer of Care Note    Patient: Roma Dunbar    Procedure(s) Performed: Procedure(s) (LRB):  ESOPHAGOGASTRODUODENOSCOPY (EGD) (N/A)    Patient location: PACU    Anesthesia Type: MAC    Transport from OR: Transported from OR on room air with adequate spontaneous ventilation    Post pain: adequate analgesia    Post assessment: no apparent anesthetic complications    Post vital signs: stable    Level of consciousness: sedated    Nausea/Vomiting: no nausea/vomiting    Complications: none    Transfer of care protocol was followed      Last vitals:   Visit Vitals  /83 (Patient Position: Lying)   Pulse 71   Temp 35.9 °C (96.7 °F) (Oral)   Resp 16   Ht 5' 3" (1.6 m)   Wt 54.7 kg (120 lb 9.5 oz)   SpO2 96%   Breastfeeding? No   BMI 21.36 kg/m²     "

## 2017-08-21 NOTE — DISCHARGE SUMMARY
[facility] Internal Medicine Discharge Summary    Primary Team: [facility] Internal Medicine Team B  Attending Physician: Son Serra MD  Resident: Dr. Soriano  Intern: Dr. 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    PVD (peripheral vascular disease)    Encounter for screening mammogram for malignant neoplasm of breast     Hyperlipemia    Coronary artery disease involving native coronary artery without angina pectoris    Midline low back pain without sciatica    Abnormal mammogram    Hematuria    Thrombocytopenia    Ischemic chest pain    Insomnia    Needs flu shot    Lipoma of torso    Sacroiliitis    Right lumbar radiculopathy    Lumbar facet arthropathy    Melena    Gastrointestinal hemorrhage with melena       Consultants and Procedures     Consultants:  Gastroenterology    Procedures:   EGD    Brief History of Present Illness      Roma Dunbar is a 67 y.o. AA female who  has a past medical history of Coronary artery disease; GERD (gastroesophageal reflux disease); Herpes simplex without mention of complication; Hyperlipidemia; Hypertension; and PAD (peripheral artery disease).  The patient presented to [facility] on 8/19/2017 with a primary complaint of GI Bleeding (black stools for 4 days, feeling weak, + sob, denies chest pain. + lower abd pain) and Abdominal Pain  .     The patient presented to the ED with a 3 day history of black stools, generalized weakness with abdominal cramping, and dizziness. She does endorse frequent and chronic use of NSAIDs.       For the  full HPI please refer to the History & Physical from this admission.    Hospital Course By Problem with Pertinent Findings     Melena, GI Bleed:  -- history and symptoms make LGIB more likely, no BRB  -- first troponin elevated at 0.1, but has trended down to 0.09   -- pt is typed and crossed, will transfuse to a goal of 7-8  -- GI consulted by ED who states continue protonix drip, npo after midnight  -- iron studies ordered and Fe, TIBC, Transferrin, and Ferritin are all wnl  -- counseled on cessation of NSAIDs  -- lactate normal, VS ok with no tachycardia or hypotension  -- no known hx hep c, acute hep panel pending  -- no known liver disease, no indication for octreotide at this time.   - H/H 9.3/27 from 9.7/27  - EGD done on 8-21 that revealed inflammation of the gastric antrum and duodenum associated with multiple small ulcers/erosions - biopsies were taken to evaluate for H Pylori infection.  - GI recommend daily PPI therapy and avoid NSAIDs, follow up H pylori testing and treat if the patient is positive. GI plans to have a repeat c-scope as an outpatient for surveillance as well as to exclude any colonic bleeding sources as contributors to anemia  -GI recommend discharge home today     HTN:   -- will hold BP meds in the setting of GIB  - continue home BP medication as outpatient and follow up with PCP     HLD:   -- not on statin at home, lipid panel wnl  -- will start lipitor     CAD s/p CABG:  -- no active chest pain, elevated trop likely due to demand ischemia  -- will trend troponins and EKG's  -- hold asa in the setting of GIB, will restart as outpatient     Tobacco dependance:  -- counseled on cessation    Discharge Medications        Medication List      START taking these medications    pantoprazole 40 mg Grps  Commonly known as:  PROTONIX  Take 1 packet (40 mg total) by mouth once daily.        CHANGE how you take these medications    cilostazol 50 MG Tab  Commonly known as:  PLETAL  What changed:   Another medication with the same name was removed. Continue taking this medication, and follow the directions you see here.        CONTINUE taking these medications    aspirin 81 MG Chew  Take 1 tablet (81 mg total) by mouth once daily.     lisinopril-hydrochlorothiazide 20-12.5 mg per tablet  Commonly known as:  PRINZIDE,ZESTORETIC  TAKE 1 TABLET BY MOUTH TWICE DAILY     metoprolol succinate 25 MG 24 hr tablet  Commonly known as:  TOPROL-XL  Take 1 tablet (25 mg total) by mouth once daily.     simvastatin 20 MG tablet  Commonly known as:  ZOCOR  Take 1 tablet (20 mg total) by mouth every evening.        STOP taking these medications    acyclovir 800 MG Tab  Commonly known as:  ZOVIRAX     etodolac 400 MG 24 hr tablet  Commonly known as:  LODINE XL     loratadine 10 mg tablet  Commonly known as:  CLARITIN     oxybutynin 5 MG Tab  Commonly known as:  DITROPAN           Where to Get Your Medications      You can get these medications from any pharmacy    Bring a paper prescription for each of these medications  · pantoprazole 40 mg Grps         Discharge Information:   Diet:  Cardiac Diet    Physical Activity:  As Tolerated    Instructions:  1. Take all medications as prescribed  2. Keep all follow-up appointments  3. Return to the hospital or call your primary care physicians if any worsening symptoms such as fever, chills, night sweats, chest pain, palpitations, SOB, fatigue, black stool, or blood in the stool occur.      Follow-Up Appointments:  Follow-up Information     Rolly Ferro MD In 1 week.    Specialty:  Internal Medicine  Contact information:  [address] [facility]  [place] [id number]  [phone]             Robert Bautista MD In 1 week.    Specialty:  Gastroenterology  Contact information:  [address]  [place] [address]  [phone]                     Nelson Talley MD  [facility] Internal Medicine, -I

## 2017-08-21 NOTE — PROGRESS NOTES
Patient discharged per MD order. Patient verbalized understanding of discharge instructions. PIV discontinued per Md orders. Catheter tip intact and pressure dressing applied. Telemetry discontinued per MD Orders. Patient transported via transport to [facility]

## 2017-08-21 NOTE — ASSESSMENT & PLAN NOTE
Presented with dark and tarry stools which began on 08/16/2017  In setting of heavy asa use with Goodys headache powders  For upper endoscopy today with possible colonoscopy tomorrow   11/32 on admission down to 9/29 today

## 2017-08-21 NOTE — HPI
Roma Dunbar is a 67 y.o.  female with CAD; GERD; Hyperlipidemia; HTN; and PAD s/p right BKA with prosthesis who presented to the ED on 8/19/2017 with a primary complaint of black tarry stools for 4 days. She associated generalized weakness, SOB, and abd pain. Patient reports heavy asa use with taking some stanbacks and goody powders for aches and pains. She denies vomiting, fever, diarrhea, has been having one bowel movement per day. Troponin mildly elevated at 0.098, .0110, 0.10. Cardiology was consulted for elevated troponin. ECG notes SR without acute ischemic changes. HH 11/32 initially and is down to 9/29 this AM

## 2017-08-21 NOTE — PROGRESS NOTES
[facility] Internal Medicine Resident HO-II Progress Note    Subjective:      Roma Dunbar is without complaint this morning. She has not had a bowel movement since her admission. She denies abdominal pain, nausea, vomiting.      Objective:   Last 24 Hour Vital Signs:  BP  Min: 119/58  Max: 168/86  Temp  Av.8 °F (36.6 °C)  Min: 96.7 °F (35.9 °C)  Max: 98.3 °F (36.8 °C)  Pulse  Av.2  Min: 63  Max: 82  Resp  Av  Min: 16  Max: 20  SpO2  Av.3 %  Min: 96 %  Max: 100 %  I/O last 3 completed shifts:  In: 813.7 [I.V.:813.7]  Out: -     Physical Examination:  General: Alert, Awake, NAD  Head: normocephalic, atraumatic  Eyes: PERRL, EOMI, no scleral icterus, no conjunctival pallor  Mouth and Throat:  moist mucus membranes  Neck: no lymphadenopathy appreciated  Respiratory: lungs clear to ascultation bilaterally, no accessory use of muscles   Cardiovascular: regular rate with regular rhythm, no murmurs  Gastrointestinal: soft, nontender, nondistended,no rebound or guarding, normoactive bowel sounds.   Extremities: pulses 2+ in all extremities, R BKA, normal ROM    Neuro:  full strength even in all extremities, no sensory deficits.   Skin: no lesions, rashes, or breakdown.     Laboratory:  Laboratory Data Reviewed:  Trended Lab Data:    Recent Labs  Lab 17  1355  17  2302 17  0620 17  1939   WBC 4.82  < > 5.87 5.20 4.76   HGB 11.2*  < > 9.4* 9.7* 9.3*   HCT 32.1*  < > 27.2* 29.0* 27.6*   *  < > 122* 174 131*   MCV 90  < > 93 95 92   RDW 13.9  < > 14.2 14.0 14.1   *  --   --  138  --    K 3.6  --   --  4.1  --      --   --  108  --    CO2 22*  --   --  23  --    BUN 30*  --   --  15  --    CREATININE 0.8  --   --  0.7  --    GLU 89  --   --  84  --    PROT 7.2  --   --   --   --    ALBUMIN 3.9  --   --   --   --    BILITOT 0.4  --   --   --   --    AST 14  --   --   --   --    ALKPHOS 58  --   --   --   --    ALT 9*  --   --   --   --    < > = values in this interval not  displayed.    Trended Cardiac Data:    Recent Labs  Lab 08/19/17  1855 08/19/17  2302 08/20/17  0620   TROPONINI 0.098* 0.100* 0.110*       Microbiology Data   Microbiology Results (last 7 days)     ** No results found for the last 168 hours. **          Radiology:  Imaging Results          X-Ray Chest 1 View (Final result)     Abnormal  Result time 08/19/17 15:50:18    Final result by Zach Live MD (08/19/17 15:50:18)                 Impression:        1. Lateral left lung apex 4 mm indeterminate nodule. Correlation recommended with any prior imaging from outside facility if/when available. Further evaluation with elective/nonemergent CT thorax can be obtained, if 2 year stability cannot be confirmed.    2. Otherwise, no radiographic acute intrathoracic process seen on this single view.          EPIC notification system was activated.      Electronically signed by: ZACH LIVE MD, MD  Date:     08/19/17  Time:    15:50              Narrative:    COMPARISON: Chest radiograph most recent 10/24/11 and a CT thorax 1/28/15    FINDINGS: AP portable  view of the chest. Prior sternotomy changes. There is a 4 mm nodular density projected over the lateral left lung apex not definitively seen on previous studies. Otherwise, the bilateral lungs are well expanded without large consolidation.  No large pleural effusion or pneumothorax.  The heart and mediastinal contours are within normal limits for age, unchanged.  No acute osseous process seen.   PA and lateral views can be obtained.                                Current Medications:     Infusions:   pantoprazole 40 mg in dextrose 5 % 100 mL infusion (ready to mix system) 8 mg/hr (08/21/17 0322)        Scheduled:   atorvastatin  20 mg Oral Daily        PRN:      Antibiotics and Day Number of Therapy:      Lines and Day Number of Therapy:  PIV    Assessment and Plan     Roma Dunbar is a 67 y.o.female with  Melena, GI Bleed:  -- history and symptoms make LGIB more  likely, no BRB  -- first troponin elevated at 0.1, but has trended down to 0.09   -- pt is typed and crossed, will transfuse to a goal of 7-8  -- GI consulted by ED who states continue protonix drip, npo after midnight  -- iron studies ordered and pending  -- counseled on cessation of NSAIDs  -- lactate normal, VS ok with no tachycardia or hypotension  -- no known hx hep c, acute hep panel pending  -- no known liver disease, no indication for octreotide at this time.   - H/H 9.3/27 from 9.7/27  - EGD today  - will follow GI recs     HTN:   -- will hold BP meds in the setting of GIB     HLD:   -- not on statin at home, lipid panel pending  -- will start lipitor     CAD s/p CABG:  -- no active chest pain, elevated trop likely due to demand ischemia  -- will trend troponins and EKG's  -- hold asa in the setting of GIB     Tobacco dependance:  -- counseled on cessation     HCM  -- tdap utd, will get remaining vax history  -- mammo, pap, c scope unknown     PPx:   --SCD's, protonix drip       Humble Womack  [facility] Internal Medicine HO-I  U Hospitalitis Service Team B  [facility] Medicine Hospitalist Pager numbers:   [facility] Hospitalist Medicine Team A (Bob/Kiesha): [phone]  [facility] Hospitalist Medicine Team B (Ponce/Mohinder):  [phone]

## 2017-08-21 NOTE — SUBJECTIVE & OBJECTIVE
Past Medical History:   Diagnosis Date    Coronary artery disease     GERD (gastroesophageal reflux disease)     Herpes simplex without mention of complication     Hyperlipidemia     Hypertension     PAD (peripheral artery disease)        Past Surgical History:   Procedure Laterality Date    BKA   mid 2012    Right side at shin level amputation. 2/2 PVD    CARDIAC SURGERY      CORONARY ARTERY BYPASS GRAFT      before [place]    HYSTERECTOMY         Review of patient's allergies indicates:  No Known Allergies    No current facility-administered medications on file prior to encounter.      Current Outpatient Prescriptions on File Prior to Encounter   Medication Sig    aspirin 81 MG Chew Take 1 tablet (81 mg total) by mouth once daily.    lisinopril-hydrochlorothiazide (PRINZIDE,ZESTORETIC) 20-12.5 mg per tablet TAKE 1 TABLET BY MOUTH TWICE DAILY    loratadine (CLARITIN) 10 mg tablet Take 1 tablet (10 mg total) by mouth once daily.    metoprolol succinate (TOPROL-XL) 25 MG 24 hr tablet Take 1 tablet (25 mg total) by mouth once daily.    oxybutynin (DITROPAN) 5 MG Tab Take 1 tablet (5 mg total) by mouth 2 (two) times daily.    simvastatin (ZOCOR) 20 MG tablet Take 1 tablet (20 mg total) by mouth every evening.     Family History     Problem Relation (Age of Onset)    Colon cancer Maternal Grandmother    Diabetes Mother    Hypertension Mother        Social History Main Topics    Smoking status: Current Some Day Smoker     Packs/day: 1.50     Years: 40.00     Types: Cigarettes    Smokeless tobacco: Never Used    Alcohol use Yes      Comment: beer on weekends    Drug use: No    Sexual activity: Not on file     Review of Systems   Constitution: Positive for malaise/fatigue. Negative for weakness.   HENT: Negative.    Eyes: Negative.    Cardiovascular: Negative for chest pain, dyspnea on exertion, leg swelling, near-syncope, orthopnea, palpitations, paroxysmal nocturnal dyspnea and syncope.    Respiratory: Positive for shortness of breath.    Endocrine: Negative.    Hematologic/Lymphatic: Negative.    Skin: Negative.    Gastrointestinal: Positive for melena.   Genitourinary: Negative.    Neurological: Negative.    Psychiatric/Behavioral: Negative.    Allergic/Immunologic: Negative.      Objective:     Vital Signs (Most Recent):  Temp: 97.3 °F (36.3 °C) (08/21/17 1204)  Pulse: 68 (08/21/17 1204)  Resp: 18 (08/21/17 1204)  BP: (!) 176/73 (08/21/17 1204)  SpO2: 100 % (08/21/17 1015) Vital Signs (24h Range):  Temp:  [96.7 °F (35.9 °C)-98.3 °F (36.8 °C)] 97.3 °F (36.3 °C)  Pulse:  [58-82] 68  Resp:  [16-27] 18  SpO2:  [96 %-100 %] 100 %  BP: ()/(47-92) 176/73     Weight: 54.7 kg (120 lb 9.5 oz)  Body mass index is 21.36 kg/m².    SpO2: 100 %  O2 Device (Oxygen Therapy): room air      Intake/Output Summary (Last 24 hours) at 08/21/17 1335  Last data filed at 08/21/17 0932   Gross per 24 hour   Intake          1031.67 ml   Output                0 ml   Net          1031.67 ml       Lines/Drains/Airways     Airway                 Airway - Non-Surgical 08/21/17 0914 Nasal Cannula less than 1 day          Peripheral Intravenous Line                 Peripheral IV - Single Lumen 08/19/17 Left Hand 2 days         Peripheral IV - Single Lumen 08/21/17 0911 Left Forearm less than 1 day                Physical Exam   Constitutional: She is oriented to person, place, and time. She appears well-developed and well-nourished. No distress.   HENT:   Head: Normocephalic and atraumatic.   Eyes: Right eye exhibits no discharge. Left eye exhibits no discharge.   Neck: No JVD present.   Cardiovascular: Normal rate and regular rhythm.  Exam reveals no gallop and no friction rub.    No murmur heard.  Pulmonary/Chest: Effort normal and breath sounds normal.   Abdominal: Soft. Bowel sounds are normal. She exhibits no distension. There is no tenderness.   Musculoskeletal: She exhibits no edema.   Neurological: She is alert and  oriented to person, place, and time.   Skin: Skin is warm and dry. She is not diaphoretic.   Psychiatric: She has a normal mood and affect. Her behavior is normal. Judgment and thought content normal.       Significant Labs:   BMP:   Recent Labs  Lab 08/19/17  1355 08/20/17  0620   GLU 89 84   * 138   K 3.6 4.1    108   CO2 22* 23   BUN 30* 15   CREATININE 0.8 0.7   CALCIUM 9.3 9.2   , CMP   Recent Labs  Lab 08/19/17  1355 08/20/17  0620   * 138   K 3.6 4.1    108   CO2 22* 23   GLU 89 84   BUN 30* 15   CREATININE 0.8 0.7   CALCIUM 9.3 9.2   PROT 7.2  --    ALBUMIN 3.9  --    BILITOT 0.4  --    ALKPHOS 58  --    AST 14  --    ALT 9*  --    ANIONGAP 10 7*   ESTGFRAFRICA >60 >60   EGFRNONAA >60 >60   , CBC   Recent Labs  Lab 08/20/17  0620 08/20/17  1939 08/21/17  0842   WBC 5.20 4.76 5.19   HGB 9.7* 9.3* 10.0*   HCT 29.0* 27.6* 29.2*    131* 137*   , INR   Recent Labs  Lab 08/19/17  1355 08/19/17  1855   INR 1.1 1.1   , Lipid Panel   Recent Labs  Lab 08/19/17  2019   CHOL 152   HDL 39*   LDLCALC 95.6   TRIG 87   CHOLHDL 25.7   , Troponin   Recent Labs  Lab 08/19/17  1855 08/19/17  2302 08/20/17  0620   TROPONINI 0.098* 0.100* 0.110*    and All pertinent lab results from the last 24 hours have been reviewed.    Significant Imaging: Echocardiogram:   2D echo with color flow doppler:   Results for orders placed or performed during the hospital encounter of 09/16/16   2D Echo w/ Color Flow Doppler   Result Value Ref Range    EF 60 55 - 65    Diastolic Dysfunction Yes (A)     Aortic Valve Regurgitation MILD     Aortic Valve Stenosis MILD (A)     Est. PA Systolic Pressure 27.21     Pericardial Effusion NONE     Tricuspid Valve Regurgitation MILD     and X-Ray: CXR: X-Ray Chest 1 View (CXR):   Results for orders placed or performed during the hospital encounter of 08/19/17   X-Ray Chest 1 View    Narrative    COMPARISON: Chest radiograph most recent 10/24/11 and a CT thorax  1/28/15    FINDINGS: AP portable  view of the chest. Prior sternotomy changes. There is a 4 mm nodular density projected over the lateral left lung apex not definitively seen on previous studies. Otherwise, the bilateral lungs are well expanded without large consolidation.  No large pleural effusion or pneumothorax.  The heart and mediastinal contours are within normal limits for age, unchanged.  No acute osseous process seen.   PA and lateral views can be obtained.    Impression    1. Lateral left lung apex 4 mm indeterminate nodule. Correlation recommended with any prior imaging from outside facility if/when available. Further evaluation with elective/nonemergent CT thorax can be obtained, if 2 year stability cannot be confirmed.    2. Otherwise, no radiographic acute intrathoracic process seen on this single view.          EPIC notification system was activated.      Electronically signed by: ZACH HADLEY MD, MD  Date:     08/19/17  Time:    15:50

## 2017-08-22 NOTE — PLAN OF CARE
pt d/jim, daughters at bedside. noHH or DME needs.          08/22/17 1254   Final Note   Assessment Type Final Discharge Note   Discharge Disposition Home   Discharge planning education complete? Yes   What phone number can be called within the next 1-3 days to see how you are doing after discharge? [phone]   Hospital Follow Up  Appt(s) scheduled? Yes   Discharge plans and expectations educations in teach back method with documentation complete? Yes   Offered [facility] -- Bedside Delivery? Yes   Discharge/Hospital Encounter Summary to (non-[facility]) PCP n/a   Referral to Outpatient Case Management complete? n/a   Referral to / orders for Home Health Complete? n/a   30 day supply of medicines given at discharge, if documented non-compliance / non-adherence? n/a   Any social issues identified prior to discharge? n/a   Did you assess the readiness or willingness of the family or caregiver to support self management of care? Yes   Right Care Referral Info   Post Acute Recommendation No Care

## 2017-08-24 ENCOUNTER — TELEPHONE (OUTPATIENT)
Dept: ENDOSCOPY | Facility: HOSPITAL | Age: 67
End: 2017-08-24

## 2017-08-24 NOTE — TELEPHONE ENCOUNTER
[facility] Gastroenterology Note    I have reviewed the patients pathology results from her recent EGD and left her a message regarding these results.  Her gastric biopsies were negative for H.  Pylori.  If she has any questions she should call my office to discuss.

## 2017-11-16 DIAGNOSIS — I10 ESSENTIAL HYPERTENSION: ICD-10-CM

## 2017-11-16 RX ORDER — LISINOPRIL AND HYDROCHLOROTHIAZIDE 12.5; 2 MG/1; MG/1
TABLET ORAL
Qty: 180 TABLET | Refills: 0 | Status: SHIPPED | OUTPATIENT
Start: 2017-11-16 | End: 2018-02-16 | Stop reason: SDUPTHER

## 2017-11-26 DIAGNOSIS — I73.9 PVD (PERIPHERAL VASCULAR DISEASE): ICD-10-CM

## 2017-11-27 RX ORDER — CILOSTAZOL 50 MG/1
TABLET ORAL
Qty: 180 TABLET | Refills: 0 | Status: SHIPPED | OUTPATIENT
Start: 2017-11-27 | End: 2018-03-19 | Stop reason: SDUPTHER

## 2018-01-08 ENCOUNTER — OFFICE VISIT (OUTPATIENT)
Dept: URGENT CARE | Facility: CLINIC | Age: 68
End: 2018-01-08
Payer: MEDICARE

## 2018-01-08 VITALS
OXYGEN SATURATION: 96 % | RESPIRATION RATE: 18 BRPM | SYSTOLIC BLOOD PRESSURE: 140 MMHG | DIASTOLIC BLOOD PRESSURE: 84 MMHG | WEIGHT: 130 LBS | HEIGHT: 62 IN | BODY MASS INDEX: 23.92 KG/M2 | TEMPERATURE: 100 F | HEART RATE: 90 BPM

## 2018-01-08 DIAGNOSIS — R68.89 FLU-LIKE SYMPTOMS: ICD-10-CM

## 2018-01-08 DIAGNOSIS — J40 BRONCHITIS: Primary | ICD-10-CM

## 2018-01-08 LAB
CTP QC/QA: YES
FLUAV AG NPH QL: NEGATIVE
FLUBV AG NPH QL: NEGATIVE

## 2018-01-08 PROCEDURE — 99214 OFFICE O/P EST MOD 30 MIN: CPT | Mod: 25,S$GLB,, | Performed by: FAMILY MEDICINE

## 2018-01-08 PROCEDURE — 87804 INFLUENZA ASSAY W/OPTIC: CPT | Mod: 59,QW,S$GLB, | Performed by: FAMILY MEDICINE

## 2018-01-08 PROCEDURE — 96372 THER/PROPH/DIAG INJ SC/IM: CPT | Mod: S$GLB,,, | Performed by: FAMILY MEDICINE

## 2018-01-08 RX ORDER — PROMETHAZINE HYDROCHLORIDE AND CODEINE PHOSPHATE 6.25; 1 MG/5ML; MG/5ML
5 SOLUTION ORAL EVERY 6 HOURS PRN
Qty: 118 ML | Refills: 0 | Status: SHIPPED | OUTPATIENT
Start: 2018-01-08 | End: 2018-03-22

## 2018-01-08 RX ORDER — CEFTRIAXONE 500 MG/1
500 INJECTION, POWDER, FOR SOLUTION INTRAMUSCULAR; INTRAVENOUS
Status: COMPLETED | OUTPATIENT
Start: 2018-01-08 | End: 2018-01-08

## 2018-01-08 RX ORDER — AZITHROMYCIN 250 MG/1
TABLET, FILM COATED ORAL
Qty: 6 TABLET | Refills: 0 | Status: SHIPPED | OUTPATIENT
Start: 2018-01-08 | End: 2018-02-16

## 2018-01-08 RX ORDER — DEXAMETHASONE SODIUM PHOSPHATE 100 MG/10ML
6 INJECTION INTRAMUSCULAR; INTRAVENOUS ONCE
Status: COMPLETED | OUTPATIENT
Start: 2018-01-08 | End: 2018-01-08

## 2018-01-08 RX ADMIN — CEFTRIAXONE 500 MG: 500 INJECTION, POWDER, FOR SOLUTION INTRAMUSCULAR; INTRAVENOUS at 01:01

## 2018-01-08 RX ADMIN — DEXAMETHASONE SODIUM PHOSPHATE 6 MG: 100 INJECTION INTRAMUSCULAR; INTRAVENOUS at 01:01

## 2018-01-08 NOTE — PATIENT INSTRUCTIONS
What Is Acute Bronchitis?  Acute bronchitis is when the airways in your lungs (bronchial tubes) become red and swollen (inflamed). It is usually caused by a viral infection. But it can also occur because of a bacteria or allergen. Symptoms include a cough that produces yellow or greenish mucus and can last for days or sometimes weeks.  Inside healthy lungs    Air travels in and out of the lungs through the airways. The linings of these airways produce sticky mucus. This mucus traps particles that enter the lungs. Tiny structures called cilia then sweep the particles out of the airways.     Healthy airway: Airways are normally open. Air moves in and out easily.      Healthy cilia: Tiny, hairlike cilia sweep mucus and particles up and out of the airways.   Lungs with bronchitis  Bronchitis often occurs with a cold or the flu virus. The airways become inflamed (red and swollen). There is a deep hacking cough from the extra mucus. Other symptoms may include:  · Wheezing  · Chest discomfort  · Shortness of breath  · Mild fever  A second infection, this time due to bacteria, may then occur. And airways irritated by allergens or smoke are more likely to get infected.        Inflamed airway: Inflammation and extra mucus narrow the airway, causing shortness of breath.      Impaired cilia: Extra mucus impairs cilia, causing congestion and wheezing. Smoking makes the problem worse.   Making a diagnosis  A physical exam, health history, and certain tests help your healthcare provider make the diagnosis.  Health history  Your healthcare provider will ask you about your symptoms.  The exam  Your provider listens to your chest for signs of congestion. He or she may also check your ears, nose, and throat.  Possible tests  · A sputum test for bacteria. This requires a sample of mucus from your lungs.  · A nasal or throat swab. This tests to see if you have a bacterial infection.  · A chest X-ray. This is done if your healthcare  provider thinks you have pneumonia.  · Tests to check for an underlying condition. Other tests may be done to check for things such as allergies, asthma, or COPD (chronic obstructive pulmonary disease). You may need to see a specialist for more lung function testing.  Treating a cough  The main treatment for bronchitis is easing symptoms. Avoiding smoke, allergens, and other things that trigger coughing can often help. If the infection is bacterial, you may be given antibiotics. During the illness, it's important to get plenty of sleep. To ease symptoms:  · Dont smoke. Also avoid secondhand smoke.  · Use a humidifier. Or try breathing in steam from a hot shower. This may help loosen mucus.  · Drink a lot of water and juice. They can soothe the throat and may help thin mucus.  · Sit up or use extra pillows when in bed. This helps to lessen coughing and congestion.  · Ask your provider about using medicine. Ask about using cough medicine, pain and fever medicine, or a decongestant.  Antibiotics  Most cases of bronchitis are caused by cold or flu viruses. They dont need antibiotics to treat them, even if your mucus is thick and green or yellow. Antibiotics dont treat viral illness and antibiotics have not been shown to have any benefit in cases of acute bronchitis. Taking antibiotics when they are not needed increases your risk of getting an infection later that is antibiotic-resistant. Antibiotics can also cause severe cases of diarrhea that require other antibiotics to treat.  It is important that you accept your healthcare provider's opinion to not use antibiotics. Your provider will prescribe antibiotics if the infection is caused by bacteria. If they are prescribed:  · Take all of the medicine. Take the medicine until it is used up, even if symptoms have improved. If you dont, the bronchitis may come back.  · Take the medicines as directed. For instance, some medicines should be taken with food.  · Ask about  side effects. Ask your provider or pharmacist what side effects are common, and what to do about them.  Follow-up care  You should see your provider again in 2 to 3 weeks. By this time, symptoms should have improved. An infection that lasts longer may mean you have a more serious problem.  Prevention  · Avoid tobacco smoke. If you smoke, quit. Stay away from smoky places. Ask friends and family not to smoke around you, or in your home or car.  · Get checked for allergies.  · Ask your provider about getting a yearly flu shot. Also ask about pneumococcal or pneumonia shots.  · Wash your hands often. This helps reduce the chance of picking up viruses that cause colds and flu.  Call your healthcare provider if:  · Symptoms worsen, or you have new symptoms  · Breathing problems worsen or  become severe  · Symptoms dont get better within a week, or within 3 days of taking antibiotics   Date Last Reviewed: 2/1/2017  © 9636-9263 [employer]. [address] [place] [address]. All rights reserved. This information is not intended as a substitute for professional medical care. Always follow your healthcare professional's instructions.      Roma was seen today for sinus problem.    Diagnoses and all orders for this visit:    Bronchitis  -     dexamethasone injection 6 mg; Inject 0.6 mLs (6 mg total) into the muscle once.  -     cefTRIAXone injection 500 mg; Inject 0.5 g (500 mg total) into the muscle one time.  -     azithromycin (ZITHROMAX Z-CEE) 250 MG tablet; Take 2 tablets (500 mg) on  Day 1,  followed by 1 tablet (250 mg) once daily on Days 2 through 5.  -     promethazine-codeine 6.25-10 mg/5 ml (PHENERGAN WITH CODEINE) 6.25-10 mg/5 mL syrup; Take 5 mLs by mouth every 6 (six) hours as needed.    Flu-like symptoms  -     POCT Influenza A/B            Follow Up Comments   Make sure that you follow up with your primary care doctor in the next 2-5 days if needed .  Return to the Urgent Care if signs  or symptoms change and certainly if you have worsening symptoms go to the nearest emergency department for further evaluation.     Margie Morrow MD

## 2018-01-08 NOTE — PROGRESS NOTES
"Subjective:       Patient ID: Roma Dunbar is a 67 y.o. female.    Vitals:  height is 5' 2" (1.575 m) and weight is 59 kg (130 lb). Her temperature is 100.4 °F (38 °C) (abnormal). Her blood pressure is 140/84 (abnormal) and her pulse is 90. Her respiration is 18 and oxygen saturation is 96%.     Chief Complaint: Sinus Problem    Sinus Problem   This is a new problem. The current episode started in the past 7 days. The problem has been gradually worsening since onset. The maximum temperature recorded prior to her arrival was 100.4 - 100.9 F. Her pain is at a severity of 3/10. The pain is mild. Associated symptoms include chills, congestion, coughing and headaches. Pertinent negatives include no ear pain, hoarse voice, shortness of breath or sore throat. Past treatments include oral decongestants. The treatment provided mild relief.     Review of Systems   Constitution: Positive for chills, fever and malaise/fatigue.   HENT: Positive for congestion. Negative for ear pain, hoarse voice and sore throat.    Eyes: Negative for discharge and redness.   Cardiovascular: Negative for chest pain, dyspnea on exertion and leg swelling.   Respiratory: Positive for cough and sputum production. Negative for shortness of breath and wheezing.    Musculoskeletal: Negative for myalgias.   Gastrointestinal: Negative for abdominal pain and nausea.   Neurological: Positive for headaches.       Objective:      Physical Exam   Constitutional: She is oriented to person, place, and time. She appears well-developed and well-nourished. She is cooperative.  Non-toxic appearance. She does not appear ill. No distress.   HENT:   Head: Normocephalic and atraumatic.   Right Ear: Hearing, tympanic membrane, external ear and ear canal normal.   Left Ear: Hearing, tympanic membrane, external ear and ear canal normal.   Nose: Nose normal. No mucosal edema, rhinorrhea or nasal deformity. No epistaxis. Right sinus exhibits no maxillary sinus tenderness and " no frontal sinus tenderness. Left sinus exhibits no maxillary sinus tenderness and no frontal sinus tenderness.   Mouth/Throat: Uvula is midline, oropharynx is clear and moist and mucous membranes are normal. No trismus in the jaw. Normal dentition. No uvula swelling. No posterior oropharyngeal erythema.   Eyes: Conjunctivae and lids are normal. No scleral icterus.   Sclera clear bilat   Neck: Trachea normal, full passive range of motion without pain and phonation normal. Neck supple.   Cardiovascular: Normal rate, regular rhythm, normal heart sounds, intact distal pulses and normal pulses.    Pulmonary/Chest: Effort normal. No respiratory distress. She has no decreased breath sounds. She has wheezes in the right upper field and the left upper field. She has rhonchi in the right lower field and the left lower field. She has no rales.   Few mild expiratory wheezes in upper lung fields.   Few mild rhonchi in the bases bilaterally that clear with cough.    Abdominal: Soft. Normal appearance and bowel sounds are normal. She exhibits no distension. There is no tenderness.   Musculoskeletal: Normal range of motion. She exhibits no edema or deformity.   Neurological: She is alert and oriented to person, place, and time. She exhibits normal muscle tone. Coordination normal.   Skin: Skin is warm, dry and intact. She is not diaphoretic. No pallor.   Psychiatric: She has a normal mood and affect. Her speech is normal and behavior is normal. Judgment and thought content normal. Cognition and memory are normal.   Nursing note and vitals reviewed.      Assessment:       1. Bronchitis    2. Flu-like symptoms        Plan:         Bronchitis  -     dexamethasone injection 6 mg; Inject 0.6 mLs (6 mg total) into the muscle once.  -     cefTRIAXone injection 500 mg; Inject 0.5 g (500 mg total) into the muscle one time.  -     azithromycin (ZITHROMAX Z-CEE) 250 MG tablet; Take 2 tablets (500 mg) on  Day 1,  followed by 1 tablet (250 mg)  once daily on Days 2 through 5.  Dispense: 6 tablet; Refill: 0  -     promethazine-codeine 6.25-10 mg/5 ml (PHENERGAN WITH CODEINE) 6.25-10 mg/5 mL syrup; Take 5 mLs by mouth every 6 (six) hours as needed.  Dispense: 118 mL; Refill: 0    Flu-like symptoms  -     POCT Influenza A/B      Follow Up Comments   Make sure that you follow up with your primary care doctor in the next 2-5 days if needed .  Return to the Urgent Care if signs or symptoms change and certainly if you have worsening symptoms go to the nearest emergency department for further evaluation.

## 2018-02-16 ENCOUNTER — OFFICE VISIT (OUTPATIENT)
Dept: INTERNAL MEDICINE | Facility: CLINIC | Age: 68
End: 2018-02-16
Payer: MEDICARE

## 2018-02-16 VITALS
OXYGEN SATURATION: 98 % | SYSTOLIC BLOOD PRESSURE: 142 MMHG | DIASTOLIC BLOOD PRESSURE: 70 MMHG | WEIGHT: 127.63 LBS | HEART RATE: 72 BPM | HEIGHT: 62 IN | BODY MASS INDEX: 23.49 KG/M2

## 2018-02-16 DIAGNOSIS — J06.9 UPPER RESPIRATORY TRACT INFECTION, UNSPECIFIED TYPE: ICD-10-CM

## 2018-02-16 DIAGNOSIS — Z89.511 HX OF RIGHT BKA: ICD-10-CM

## 2018-02-16 DIAGNOSIS — Z00.00 PREVENTATIVE HEALTH CARE: ICD-10-CM

## 2018-02-16 DIAGNOSIS — F32.A DEPRESSION, UNSPECIFIED DEPRESSION TYPE: ICD-10-CM

## 2018-02-16 DIAGNOSIS — F41.9 ANXIETY: ICD-10-CM

## 2018-02-16 DIAGNOSIS — I10 ESSENTIAL HYPERTENSION: Primary | ICD-10-CM

## 2018-02-16 DIAGNOSIS — Z11.59 NEED FOR HEPATITIS C SCREENING TEST: ICD-10-CM

## 2018-02-16 DIAGNOSIS — Z12.11 COLON CANCER SCREENING: ICD-10-CM

## 2018-02-16 DIAGNOSIS — F17.200 TOBACCO DEPENDENCE: ICD-10-CM

## 2018-02-16 DIAGNOSIS — D64.9 ANEMIA, UNSPECIFIED TYPE: ICD-10-CM

## 2018-02-16 DIAGNOSIS — E78.5 HYPERLIPIDEMIA, UNSPECIFIED HYPERLIPIDEMIA TYPE: ICD-10-CM

## 2018-02-16 PROCEDURE — 3008F BODY MASS INDEX DOCD: CPT | Mod: S$GLB,,, | Performed by: INTERNAL MEDICINE

## 2018-02-16 PROCEDURE — 1159F MED LIST DOCD IN RCRD: CPT | Mod: S$GLB,,, | Performed by: INTERNAL MEDICINE

## 2018-02-16 PROCEDURE — 99999 PR PBB SHADOW E&M-EST. PATIENT-LVL IV: CPT | Mod: PBBFAC,,, | Performed by: INTERNAL MEDICINE

## 2018-02-16 PROCEDURE — 99214 OFFICE O/P EST MOD 30 MIN: CPT | Mod: S$GLB,,, | Performed by: INTERNAL MEDICINE

## 2018-02-16 PROCEDURE — 1126F AMNT PAIN NOTED NONE PRSNT: CPT | Mod: S$GLB,,, | Performed by: INTERNAL MEDICINE

## 2018-02-16 RX ORDER — SERTRALINE HYDROCHLORIDE 50 MG/1
50 TABLET, FILM COATED ORAL DAILY
Qty: 90 TABLET | Refills: 1 | Status: SHIPPED | OUTPATIENT
Start: 2018-02-16 | End: 2018-06-05

## 2018-02-16 RX ORDER — BENZONATATE 100 MG/1
100 CAPSULE ORAL 3 TIMES DAILY PRN
Qty: 30 CAPSULE | Refills: 0 | Status: SHIPPED | OUTPATIENT
Start: 2018-02-16 | End: 2018-02-26

## 2018-02-16 RX ORDER — LISINOPRIL AND HYDROCHLOROTHIAZIDE 12.5; 2 MG/1; MG/1
1 TABLET ORAL 2 TIMES DAILY
Qty: 180 TABLET | Refills: 1 | Status: SHIPPED | OUTPATIENT
Start: 2018-02-16 | End: 2018-03-16 | Stop reason: SDUPTHER

## 2018-02-16 RX ORDER — SIMVASTATIN 20 MG/1
20 TABLET, FILM COATED ORAL NIGHTLY
Qty: 90 TABLET | Refills: 1 | Status: ON HOLD | OUTPATIENT
Start: 2018-02-16 | End: 2018-06-23 | Stop reason: HOSPADM

## 2018-02-16 RX ORDER — METOPROLOL SUCCINATE 25 MG/1
25 TABLET, EXTENDED RELEASE ORAL DAILY
Qty: 90 TABLET | Refills: 1 | Status: SHIPPED | OUTPATIENT
Start: 2018-02-16 | End: 2018-04-26 | Stop reason: SDUPTHER

## 2018-02-16 NOTE — PROGRESS NOTES
Pt. ID: Roma Dunbar is a 67 y.o. female      Chief complaint:   Chief Complaint   Patient presents with    Annual Exam       HPI: pt. Here for f/u for HTN, anxiety and depression; reviewed CBC dated 8/21/17 and anemia was stable; BP is borderline elevated; she thinks she may be taking one BP med; I advised her to take lisinopril HCTZ QAM and toprol XL QPM; I explained in detail, risks of non-compliance; of note, she is getting over a cold and has mild cough and she would like a cough meds; she smokes PPD and she would like smoking cessation course; she states colonoscopy is due and I will schedule; of note, she has BKA; pt. Reports few month hx of anxiety and depression without suicidal ideation; symptoms have been present for past 6 months; she had flu shot 11/17; she will get tetanus and shingles vaccine at local pharmacy; she needs refills on cholesterol med      Review of Systems   Constitutional: Negative for chills and fever.   Respiratory: Positive for cough.    Cardiovascular: Negative for chest pain.   Gastrointestinal: Negative for abdominal pain, nausea and vomiting.   Genitourinary: Negative for dysuria.   Psychiatric/Behavioral: Positive for depression. The patient is nervous/anxious.          Objective:    Physical Exam   Constitutional: She is oriented to person, place, and time. She appears well-developed.   Eyes: EOM are normal.   Neck: Normal range of motion.   Cardiovascular: Normal rate, regular rhythm and normal heart sounds.    Pulmonary/Chest: Effort normal and breath sounds normal.   Abdominal: Soft. There is no tenderness. There is no rebound and no guarding.   Musculoskeletal: Normal range of motion.   R BKA   Neurological: She is alert and oriented to person, place, and time.   Skin: No rash noted.         Health Maintenance   Topic Date Due    Hepatitis C Screening  1950    Pneumococcal (65+) (2 of 2 - PPSV23) 01/22/2016    DEXA SCAN  01/28/2018    Mammogram  08/02/2018     Colonoscopy  03/16/2020    Lipid Panel  08/19/2022    TETANUS VACCINE  02/16/2028    Zoster Vaccine  Addressed    Influenza Vaccine  Completed         Assessment:     1. Essential hypertension Sub-optimally controlled   2. Anemia, unspecified type Stable   3. Hyperlipidemia, unspecified hyperlipidemia type Active   4. Depression, unspecified depression type Active   5. Anxiety Active   6. Upper respiratory tract infection, unspecified type Active   7. Tobacco dependence Sub-optimally controlled   8. Preventative health care Active   9. Hx of right BKA Active   10. Colon cancer screening Active   11. Need for hepatitis C screening test Active         Plan: Essential hypertension  Comments:  asked pt. to take lisinopril HCTZ QAM and toprol QPM; encouraged low Na diet and compliance with both meds; repeat BP on f/u in 1 month  Orders:  -     CBC auto differential; Future; Expected date: 02/16/2018  -     Comprehensive metabolic panel; Future; Expected date: 02/16/2018  -     Urinalysis; Future; Expected date: 02/16/2018  -     lisinopril-hydrochlorothiazide (PRINZIDE,ZESTORETIC) 20-12.5 mg per tablet; Take 1 tablet by mouth 2 (two) times daily.  Dispense: 180 tablet; Refill: 1  -     metoprolol succinate (TOPROL-XL) 25 MG 24 hr tablet; Take 1 tablet (25 mg total) by mouth once daily.  Dispense: 90 tablet; Refill: 1    Anemia, unspecified type  Comments:  get CBC with ferritin prior to 1 month appt.; schedule colonoscopy   Orders:  -     CBC auto differential; Future; Expected date: 02/16/2018  -     Ferritin; Future; Expected date: 02/16/2018  -     Case request GI: COLONOSCOPY    Hyperlipidemia, unspecified hyperlipidemia type  Comments:  continue current regimen and encouraged diet modification;   Orders:  -     Lipid panel; Future; Expected date: 02/16/2018  -     simvastatin (ZOCOR) 20 MG tablet; Take 1 tablet (20 mg total) by mouth every evening.  Dispense: 90 tablet; Refill: 1    Depression, unspecified  depression type  Comments:  start zoloft and re-evaluate in 1 month; pt. denies suicidal ideation   Orders:  -     sertraline (ZOLOFT) 50 MG tablet; Take 1 tablet (50 mg total) by mouth once daily.  Dispense: 90 tablet; Refill: 1    Anxiety  Comments:  start zoloft and re-evaluate in 1 month   Orders:  -     sertraline (ZOLOFT) 50 MG tablet; Take 1 tablet (50 mg total) by mouth once daily.  Dispense: 90 tablet; Refill: 1    Upper respiratory tract infection, unspecified type  Comments:  start tessalon prn  Orders:  -     benzonatate (TESSALON) 100 MG capsule; Take 1 capsule (100 mg total) by mouth 3 (three) times daily as needed.  Dispense: 30 capsule; Refill: 0    Tobacco dependence  Comments:  encouraged cessation and explained risks   Orders:  -     Ambulatory referral to Smoking Cessation Program    Preventative health care  -     CBC auto differential; Future; Expected date: 02/16/2018  -     Lipid panel; Future; Expected date: 02/16/2018  -     Comprehensive metabolic panel; Future; Expected date: 02/16/2018  -     Urinalysis; Future; Expected date: 02/16/2018    Hx of right BKA    Colon cancer screening  -     Case request GI: COLONOSCOPY    Need for hepatitis C screening test  -     Hepatitis C antibody; Future; Expected date: 02/16/2018        Problem List Items Addressed This Visit        Psychiatric    Anxiety    Overview     start zoloft and re-evaluate in 1 month          Relevant Medications    sertraline (ZOLOFT) 50 MG tablet    Depression    Overview     start zoloft and re-evaluate in 1 month; pt. denies suicidal ideation          Relevant Medications    sertraline (ZOLOFT) 50 MG tablet       ENT    Upper respiratory tract infection    Overview     start tessalon prn         Relevant Medications    benzonatate (TESSALON) 100 MG capsule       Cardiac/Vascular    Hyperlipemia    Relevant Medications    simvastatin (ZOCOR) 20 MG tablet    Other Relevant Orders    Lipid panel    Essential hypertension -  Primary    Relevant Medications    lisinopril-hydrochlorothiazide (PRINZIDE,ZESTORETIC) 20-12.5 mg per tablet    metoprolol succinate (TOPROL-XL) 25 MG 24 hr tablet    Other Relevant Orders    CBC auto differential    Comprehensive metabolic panel    Urinalysis       ID    Need for hepatitis C screening test    Relevant Orders    Hepatitis C antibody       Oncology    Anemia    Relevant Orders    CBC auto differential    Ferritin    Case request GI: COLONOSCOPY (Completed)       Orthopedic    Hx of right BKA       Other    Tobacco dependence    Relevant Orders    Ambulatory referral to Smoking Cessation Program    Preventative health care    Relevant Orders    CBC auto differential    Lipid panel    Comprehensive metabolic panel    Urinalysis

## 2018-02-17 PROBLEM — Z89.511 HX OF RIGHT BKA: Status: ACTIVE | Noted: 2018-02-17

## 2018-02-17 PROBLEM — F32.A DEPRESSION: Status: ACTIVE | Noted: 2018-02-17

## 2018-02-17 PROBLEM — F41.9 ANXIETY: Status: ACTIVE | Noted: 2018-02-17

## 2018-02-17 PROBLEM — J06.9 UPPER RESPIRATORY TRACT INFECTION: Status: ACTIVE | Noted: 2018-02-17

## 2018-02-22 ENCOUNTER — TELEPHONE (OUTPATIENT)
Dept: GASTROENTEROLOGY | Facility: CLINIC | Age: 68
End: 2018-02-22

## 2018-02-22 NOTE — TELEPHONE ENCOUNTER
Referral sent from Dr. Ferro to have patient scheduled for a Screening Colonoscopy. Left a message on voicemail to call the clinic back to schedule.

## 2018-03-07 ENCOUNTER — LAB VISIT (OUTPATIENT)
Dept: LAB | Facility: HOSPITAL | Age: 68
End: 2018-03-07
Attending: INTERNAL MEDICINE
Payer: MEDICARE

## 2018-03-07 DIAGNOSIS — Z00.00 PREVENTATIVE HEALTH CARE: ICD-10-CM

## 2018-03-07 DIAGNOSIS — D64.9 ANEMIA, UNSPECIFIED TYPE: ICD-10-CM

## 2018-03-07 DIAGNOSIS — Z11.59 NEED FOR HEPATITIS C SCREENING TEST: ICD-10-CM

## 2018-03-07 DIAGNOSIS — I10 ESSENTIAL HYPERTENSION: ICD-10-CM

## 2018-03-07 DIAGNOSIS — E78.5 HYPERLIPIDEMIA, UNSPECIFIED HYPERLIPIDEMIA TYPE: ICD-10-CM

## 2018-03-07 LAB
ALBUMIN SERPL BCP-MCNC: 4.1 G/DL
ALP SERPL-CCNC: 70 U/L
ALT SERPL W/O P-5'-P-CCNC: 10 U/L
ANION GAP SERPL CALC-SCNC: 10 MMOL/L
AST SERPL-CCNC: 17 U/L
BASOPHILS # BLD AUTO: 0.05 K/UL
BASOPHILS NFR BLD: 1 %
BILIRUB SERPL-MCNC: 0.6 MG/DL
BUN SERPL-MCNC: 16 MG/DL
CALCIUM SERPL-MCNC: 10.2 MG/DL
CHLORIDE SERPL-SCNC: 102 MMOL/L
CHOLEST SERPL-MCNC: 178 MG/DL
CHOLEST/HDLC SERPL: 2.4 {RATIO}
CO2 SERPL-SCNC: 24 MMOL/L
CREAT SERPL-MCNC: 0.8 MG/DL
DIFFERENTIAL METHOD: ABNORMAL
EOSINOPHIL # BLD AUTO: 0.1 K/UL
EOSINOPHIL NFR BLD: 1.9 %
ERYTHROCYTE [DISTWIDTH] IN BLOOD BY AUTOMATED COUNT: 17.7 %
EST. GFR  (AFRICAN AMERICAN): >60 ML/MIN/1.73 M^2
EST. GFR  (NON AFRICAN AMERICAN): >60 ML/MIN/1.73 M^2
FERRITIN SERPL-MCNC: 26 NG/ML
GLUCOSE SERPL-MCNC: 93 MG/DL
HCT VFR BLD AUTO: 42.5 %
HDLC SERPL-MCNC: 73 MG/DL
HDLC SERPL: 41 %
HGB BLD-MCNC: 14 G/DL
IMM GRANULOCYTES # BLD AUTO: 0.02 K/UL
IMM GRANULOCYTES NFR BLD AUTO: 0.4 %
LDLC SERPL CALC-MCNC: 90.6 MG/DL
LYMPHOCYTES # BLD AUTO: 1.7 K/UL
LYMPHOCYTES NFR BLD: 33.7 %
MCH RBC QN AUTO: 29.5 PG
MCHC RBC AUTO-ENTMCNC: 32.9 G/DL
MCV RBC AUTO: 90 FL
MONOCYTES # BLD AUTO: 0.6 K/UL
MONOCYTES NFR BLD: 10.9 %
NEUTROPHILS # BLD AUTO: 2.7 K/UL
NEUTROPHILS NFR BLD: 52.1 %
NONHDLC SERPL-MCNC: 105 MG/DL
NRBC BLD-RTO: 0 /100 WBC
PLATELET # BLD AUTO: 199 K/UL
PMV BLD AUTO: 12.4 FL
POTASSIUM SERPL-SCNC: 4.2 MMOL/L
PROT SERPL-MCNC: 8.2 G/DL
RBC # BLD AUTO: 4.75 M/UL
SODIUM SERPL-SCNC: 136 MMOL/L
TRIGL SERPL-MCNC: 72 MG/DL
WBC # BLD AUTO: 5.13 K/UL

## 2018-03-07 PROCEDURE — 82728 ASSAY OF FERRITIN: CPT

## 2018-03-07 PROCEDURE — 86803 HEPATITIS C AB TEST: CPT

## 2018-03-07 PROCEDURE — 80061 LIPID PANEL: CPT

## 2018-03-07 PROCEDURE — 85025 COMPLETE CBC W/AUTO DIFF WBC: CPT

## 2018-03-07 PROCEDURE — 36415 COLL VENOUS BLD VENIPUNCTURE: CPT | Mod: PO

## 2018-03-07 PROCEDURE — 80053 COMPREHEN METABOLIC PANEL: CPT

## 2018-03-08 LAB — HCV AB SERPL QL IA: NEGATIVE

## 2018-03-16 ENCOUNTER — TELEPHONE (OUTPATIENT)
Dept: GASTROENTEROLOGY | Facility: CLINIC | Age: 68
End: 2018-03-16

## 2018-03-16 DIAGNOSIS — I10 ESSENTIAL HYPERTENSION: ICD-10-CM

## 2018-03-16 RX ORDER — LISINOPRIL AND HYDROCHLOROTHIAZIDE 12.5; 2 MG/1; MG/1
1 TABLET ORAL 2 TIMES DAILY
Qty: 180 TABLET | Refills: 0 | Status: SHIPPED | OUTPATIENT
Start: 2018-03-16 | End: 2018-07-10

## 2018-03-16 NOTE — TELEPHONE ENCOUNTER
----- Message from Evy Perea sent at 3/16/2018  2:29 PM CDT -----  Contact: self/[phone]  Patient is requesting to have a refill of lisinopril-hydrochlorothiazide (PRINZIDE,ZESTORETIC) 20-12.5 mg per tablet sent [facility] - [place], [place] - [address]  . Please advise.

## 2018-03-16 NOTE — TELEPHONE ENCOUNTER
Referral was sent from Dr. Pettit to schedule an Colonoscopy, tried to leave an voicemail for patient to call the office back in regards to scheduling procedure. Patient mailbox is full.

## 2018-03-17 NOTE — TELEPHONE ENCOUNTER
Informed pt medication refill request sent to [facility]. Pt verbalized understanding.

## 2018-03-19 DIAGNOSIS — I73.9 PVD (PERIPHERAL VASCULAR DISEASE): ICD-10-CM

## 2018-03-19 RX ORDER — CILOSTAZOL 50 MG/1
50 TABLET ORAL 2 TIMES DAILY
Qty: 180 TABLET | Refills: 0 | Status: SHIPPED | OUTPATIENT
Start: 2018-03-19 | End: 2018-03-22 | Stop reason: SDUPTHER

## 2018-03-19 NOTE — TELEPHONE ENCOUNTER
----- Message from Zari Crenshaw sent at 3/19/2018  1:24 PM CDT -----  Contact: [phone]  Pt called stating [facility] didn't received rx lisinopril-hydrochlorothiazide (PRINZIDE,ZESTORETIC) 20-12.5 mg per tablet. Please have the doctor resend it . Please advise

## 2018-03-19 NOTE — TELEPHONE ENCOUNTER
Lisinopril HCTZ was sent to [facility] on 3/16/18; please clarify with pharmacy receipt; if not okay to approve 90 day supply as I sent on 3/16/18

## 2018-03-21 ENCOUNTER — TELEPHONE (OUTPATIENT)
Dept: INTERNAL MEDICINE | Facility: CLINIC | Age: 68
End: 2018-03-21

## 2018-03-21 ENCOUNTER — TELEPHONE (OUTPATIENT)
Dept: GASTROENTEROLOGY | Facility: CLINIC | Age: 68
End: 2018-03-21

## 2018-03-21 NOTE — TELEPHONE ENCOUNTER
----- Message from Kalpana Roblero sent at 3/21/2018  9:09 AM CDT -----  Contact: Self [phone]  Patient Returning Your Phone Call

## 2018-03-21 NOTE — TELEPHONE ENCOUNTER
Referral was sent from Dr. Ferro to schedule an Colonoscopy, tried to leave an voicemail for patient to call the office back in regards to scheduling procedure.

## 2018-03-22 ENCOUNTER — OFFICE VISIT (OUTPATIENT)
Dept: INTERNAL MEDICINE | Facility: CLINIC | Age: 68
End: 2018-03-22
Payer: MEDICARE

## 2018-03-22 VITALS
SYSTOLIC BLOOD PRESSURE: 132 MMHG | BODY MASS INDEX: 23.82 KG/M2 | DIASTOLIC BLOOD PRESSURE: 78 MMHG | WEIGHT: 129.44 LBS | HEART RATE: 61 BPM | OXYGEN SATURATION: 98 % | HEIGHT: 62 IN

## 2018-03-22 DIAGNOSIS — I25.10 CORONARY ARTERY DISEASE, ANGINA PRESENCE UNSPECIFIED, UNSPECIFIED VESSEL OR LESION TYPE, UNSPECIFIED WHETHER NATIVE OR TRANSPLANTED HEART: ICD-10-CM

## 2018-03-22 DIAGNOSIS — D64.9 ANEMIA, UNSPECIFIED TYPE: ICD-10-CM

## 2018-03-22 DIAGNOSIS — I73.9 PVD (PERIPHERAL VASCULAR DISEASE): ICD-10-CM

## 2018-03-22 DIAGNOSIS — F41.9 ANXIETY: ICD-10-CM

## 2018-03-22 DIAGNOSIS — E78.5 HYPERLIPIDEMIA, UNSPECIFIED HYPERLIPIDEMIA TYPE: ICD-10-CM

## 2018-03-22 DIAGNOSIS — F17.200 TOBACCO DEPENDENCE: ICD-10-CM

## 2018-03-22 DIAGNOSIS — M89.9 DISORDER OF BONE: ICD-10-CM

## 2018-03-22 DIAGNOSIS — F32.A DEPRESSION, UNSPECIFIED DEPRESSION TYPE: ICD-10-CM

## 2018-03-22 DIAGNOSIS — I10 ESSENTIAL HYPERTENSION: Primary | ICD-10-CM

## 2018-03-22 PROBLEM — N91.2 AMENORRHEA: Status: ACTIVE | Noted: 2018-03-22

## 2018-03-22 PROCEDURE — 99999 PR PBB SHADOW E&M-EST. PATIENT-LVL IV: CPT | Mod: PBBFAC,,, | Performed by: INTERNAL MEDICINE

## 2018-03-22 PROCEDURE — 99214 OFFICE O/P EST MOD 30 MIN: CPT | Mod: S$GLB,,, | Performed by: INTERNAL MEDICINE

## 2018-03-22 PROCEDURE — 3078F DIAST BP <80 MM HG: CPT | Mod: CPTII,S$GLB,, | Performed by: INTERNAL MEDICINE

## 2018-03-22 PROCEDURE — 3075F SYST BP GE 130 - 139MM HG: CPT | Mod: CPTII,S$GLB,, | Performed by: INTERNAL MEDICINE

## 2018-03-22 RX ORDER — CILOSTAZOL 50 MG/1
50 TABLET ORAL 2 TIMES DAILY
Qty: 60 TABLET | Refills: 1 | Status: SHIPPED | OUTPATIENT
Start: 2018-03-22 | End: 2018-04-30 | Stop reason: SDUPTHER

## 2018-03-22 NOTE — PROGRESS NOTES
Pt. ID: Roma Dunbar is a 67 y.o. female      Chief complaint:   Chief Complaint   Patient presents with    Follow-up     Hypertension       HPI: Pt. Here for f/u for HTN and depression/anxiety; she has changed dosing times for BP meds and has not started started zoloft; I reviewed labs dated 3/7/18; cholesterol is WNL and anemia has normalized; she has not scheduled colonoscopy; I explained risks of non-compliance; she smokes 1 PPD and would like smoking cessation; she sees vascular for PVD and is on ASA and pletal; she needs refill on pletal and has been out for past 1 week; she also see cardiology for CAD; she would like bone density      Review of Systems   Constitutional: Negative for chills and fever.   Respiratory: Negative for cough and shortness of breath.    Cardiovascular: Negative for chest pain.   Gastrointestinal: Negative for abdominal pain, nausea and vomiting.   Genitourinary: Negative for dysuria.   Psychiatric/Behavioral: Positive for depression. The patient is nervous/anxious.         Without suicidal ideation; pt. Has not started zoloft          Objective:    Physical Exam   Constitutional: She is oriented to person, place, and time.   Eyes: EOM are normal.   Neck: Normal range of motion.   Cardiovascular: Normal rate, regular rhythm and normal heart sounds.    Pulmonary/Chest: Effort normal and breath sounds normal.   Abdominal: Soft. There is no tenderness. There is no rebound and no guarding.   Musculoskeletal: Normal range of motion.   R BKA; gait instability: uses a cane   Neurological: She is alert and oriented to person, place, and time.   Skin: No rash noted.         Health Maintenance   Topic Date Due    DEXA SCAN  01/28/2018    Pneumococcal (65+) (2 of 2 - PPSV23) 04/26/2018 (Originally 1/22/2016)    Mammogram  08/02/2018    Colonoscopy  03/16/2020    Lipid Panel  03/07/2023    TETANUS VACCINE  02/16/2028    Hepatitis C Screening  Completed    Zoster Vaccine  Addressed     Influenza Vaccine  Completed         Assessment:     1. Essential hypertension Well controlled   2. Anemia, unspecified type Well controlled   3. Hyperlipidemia, unspecified hyperlipidemia type Well controlled   4. PVD (peripheral vascular disease) Well controlled   5. Coronary artery disease, angina presence unspecified, unspecified vessel or lesion type, unspecified whether native or transplanted heart Well controlled   6. Anxiety Sub-optimally controlled   7. Depression, unspecified depression type Sub-optimally controlled   8. Disorder of bone  Active   9. Tobacco dependence Sub-optimally controlled         Plan: Essential hypertension  Comments:  continue current regimen and encouraged low Na diet     Anemia, unspecified type  Comments:  normalized; monitor     Hyperlipidemia, unspecified hyperlipidemia type  Comments:  continue current regimen and encouraged diet modification     PVD (peripheral vascular disease)  Comments:  restart pletal and f/u vascular who is managing  Orders:  -     cilostazol (PLETAL) 50 MG Tab; Take 1 tablet (50 mg total) by mouth 2 (two) times daily.  Dispense: 60 tablet; Refill: 1    Coronary artery disease, angina presence unspecified, unspecified vessel or lesion type, unspecified whether native or transplanted heart  Comments:  continue current regimen and f/u cardiology who is managing     Anxiety  Comments:  asked pt. to start zoloft and re-evaluate in 1 month     Depression, unspecified depression type  Comments:  asked pt. to start zoloft and re-evaluate in 1 month     Disorder of bone   -     DXA Bone Density Spine And Hip; Future; Expected date: 03/22/2018    Tobacco dependence  Comments:  encouraged cessation and explained risks   Orders:  -     Ambulatory referral to Smoking Cessation Program        Problem List Items Addressed This Visit        Psychiatric    Anxiety    Overview     start zoloft and re-evaluate in 1 month          Depression    Overview     start zoloft  and re-evaluate in 1 month; pt. denies suicidal ideation             Cardiac/Vascular    Hyperlipidemia    PVD (peripheral vascular disease)    Relevant Medications    cilostazol (PLETAL) 50 MG Tab    Essential hypertension - Primary       Oncology    Anemia       Orthopedic    Disorder of bone     Relevant Orders    DXA Bone Density Spine And Hip       Other    Tobacco dependence    Relevant Orders    Ambulatory referral to Smoking Cessation Program      Other Visit Diagnoses     Coronary artery disease, angina presence unspecified, unspecified vessel or lesion type, unspecified whether native or transplanted heart   (Well controlled)      continue current regimen and f/u cardiology who is managing

## 2018-03-29 ENCOUNTER — TELEPHONE (OUTPATIENT)
Dept: SMOKING CESSATION | Facility: CLINIC | Age: 68
End: 2018-03-29

## 2018-04-02 ENCOUNTER — HOSPITAL ENCOUNTER (OUTPATIENT)
Dept: RADIOLOGY | Facility: HOSPITAL | Age: 68
Discharge: HOME | End: 2018-04-02
Attending: INTERNAL MEDICINE
Payer: MEDICARE

## 2018-04-02 DIAGNOSIS — Z12.31 SCREENING MAMMOGRAM, ENCOUNTER FOR: Primary | ICD-10-CM

## 2018-04-02 DIAGNOSIS — Z12.31 ENCOUNTER FOR SCREENING MAMMOGRAM FOR BREAST CANCER: Primary | ICD-10-CM

## 2018-04-02 DIAGNOSIS — M89.9 DISORDER OF BONE: ICD-10-CM

## 2018-04-02 PROCEDURE — 77080 DXA BONE DENSITY AXIAL: CPT | Mod: 26,,, | Performed by: RADIOLOGY

## 2018-04-02 PROCEDURE — 77080 DXA BONE DENSITY AXIAL: CPT | Mod: TC

## 2018-04-02 NOTE — TELEPHONE ENCOUNTER
----- Message from Calixto Mcclellan sent at 4/2/2018  9:45 AM CDT -----  Contact: [phone]  Pt is requesting order for mammogram. Pt is scheduled for 4/6/18

## 2018-04-12 ENCOUNTER — CLINICAL SUPPORT (OUTPATIENT)
Dept: SMOKING CESSATION | Facility: CLINIC | Age: 68
End: 2018-04-12
Payer: COMMERCIAL

## 2018-04-12 DIAGNOSIS — F17.210 MODERATE CIGARETTE SMOKER (10-19 PER DAY): Primary | ICD-10-CM

## 2018-04-12 PROCEDURE — 99404 PREV MED CNSL INDIV APPRX 60: CPT | Mod: S$GLB,,,

## 2018-04-12 PROCEDURE — 99999 PR PBB SHADOW E&M-EST. PATIENT-LVL I: CPT | Mod: PBBFAC,,,

## 2018-04-12 RX ORDER — DM/P-EPHED/ACETAMINOPH/DOXYLAM 30-7.5/3
2 LIQUID (ML) ORAL
Qty: 108 LOZENGE | Refills: 0 | Status: SHIPPED | OUTPATIENT
Start: 2018-04-12

## 2018-04-12 RX ORDER — IBUPROFEN 200 MG
1 TABLET ORAL DAILY
Qty: 14 PATCH | Refills: 0 | Status: SHIPPED | OUTPATIENT
Start: 2018-04-12 | End: 2018-04-30 | Stop reason: SDUPTHER

## 2018-04-18 ENCOUNTER — HOSPITAL ENCOUNTER (OUTPATIENT)
Dept: RADIOLOGY | Facility: HOSPITAL | Age: 68
Discharge: HOME | End: 2018-04-18
Attending: INTERNAL MEDICINE
Payer: MEDICARE

## 2018-04-18 DIAGNOSIS — Z12.31 SCREENING MAMMOGRAM, ENCOUNTER FOR: ICD-10-CM

## 2018-04-18 PROCEDURE — 77063 BREAST TOMOSYNTHESIS BI: CPT | Mod: 26,,, | Performed by: RADIOLOGY

## 2018-04-18 PROCEDURE — 77067 SCR MAMMO BI INCL CAD: CPT | Mod: 26,,, | Performed by: RADIOLOGY

## 2018-04-18 PROCEDURE — 77067 SCR MAMMO BI INCL CAD: CPT | Mod: TC

## 2018-04-23 ENCOUNTER — CLINICAL SUPPORT (OUTPATIENT)
Dept: SMOKING CESSATION | Facility: CLINIC | Age: 68
End: 2018-04-23
Payer: COMMERCIAL

## 2018-04-23 DIAGNOSIS — F17.210 LIGHT CIGARETTE SMOKER (1-9 CIGS/DAY): Primary | ICD-10-CM

## 2018-04-23 PROCEDURE — 99999 PR PBB SHADOW E&M-EST. PATIENT-LVL I: CPT | Mod: PBBFAC,,,

## 2018-04-23 PROCEDURE — 99407 BEHAV CHNG SMOKING > 10 MIN: CPT | Mod: S$GLB,,,

## 2018-04-26 ENCOUNTER — OFFICE VISIT (OUTPATIENT)
Dept: INTERNAL MEDICINE | Facility: CLINIC | Age: 68
End: 2018-04-26
Payer: MEDICARE

## 2018-04-26 VITALS
HEART RATE: 63 BPM | HEIGHT: 62 IN | BODY MASS INDEX: 24.42 KG/M2 | WEIGHT: 132.69 LBS | SYSTOLIC BLOOD PRESSURE: 155 MMHG | DIASTOLIC BLOOD PRESSURE: 78 MMHG

## 2018-04-26 DIAGNOSIS — I25.10 CORONARY ARTERY DISEASE INVOLVING NATIVE CORONARY ARTERY OF NATIVE HEART WITHOUT ANGINA PECTORIS: ICD-10-CM

## 2018-04-26 DIAGNOSIS — I10 ESSENTIAL HYPERTENSION: Primary | ICD-10-CM

## 2018-04-26 DIAGNOSIS — F41.9 ANXIETY: ICD-10-CM

## 2018-04-26 DIAGNOSIS — I73.9 PVD (PERIPHERAL VASCULAR DISEASE): ICD-10-CM

## 2018-04-26 DIAGNOSIS — F32.A DEPRESSION, UNSPECIFIED DEPRESSION TYPE: ICD-10-CM

## 2018-04-26 DIAGNOSIS — F17.200 TOBACCO DEPENDENCE: ICD-10-CM

## 2018-04-26 PROCEDURE — 3078F DIAST BP <80 MM HG: CPT | Mod: CPTII,S$GLB,, | Performed by: INTERNAL MEDICINE

## 2018-04-26 PROCEDURE — 99214 OFFICE O/P EST MOD 30 MIN: CPT | Mod: S$GLB,,, | Performed by: INTERNAL MEDICINE

## 2018-04-26 PROCEDURE — 99999 PR PBB SHADOW E&M-EST. PATIENT-LVL IV: CPT | Mod: PBBFAC,,, | Performed by: INTERNAL MEDICINE

## 2018-04-26 PROCEDURE — 3077F SYST BP >= 140 MM HG: CPT | Mod: CPTII,S$GLB,, | Performed by: INTERNAL MEDICINE

## 2018-04-26 RX ORDER — METOPROLOL SUCCINATE 25 MG/1
25 TABLET, EXTENDED RELEASE ORAL 2 TIMES DAILY
Qty: 60 TABLET | Refills: 1 | Status: SHIPPED | OUTPATIENT
Start: 2018-04-26 | End: 2018-06-05

## 2018-04-26 NOTE — PROGRESS NOTES
Pt. ID: Roma Dunbar is a 67 y.o. female      Chief complaint:   Chief Complaint   Patient presents with    Hypertension     follow up       HPI: Pt. Here for f/u for HTN and PVD; she is compliant with meds and restarted pletal; she did not try zoloft for anxiety and depression and I advised her to start; bone density was normal; mammogram was negative; BP is elevated; she smokes PPD and started smoking cessation; pt. Has not seen cardiology since 2016 for CAD/PVD and I advised her to f/u and explained risks of non-compliance; she takes asa QD      Review of Systems   Constitutional: Negative for chills and fever.   Respiratory: Negative for cough and shortness of breath.    Cardiovascular: Negative for chest pain.   Gastrointestinal: Negative for abdominal pain, nausea and vomiting.   Genitourinary: Negative for dysuria.   Psychiatric/Behavioral: Positive for depression. The patient is nervous/anxious.         Without suicidal ideation; pt. Has not started zoloft          Objective:    Physical Exam   Constitutional: She is oriented to person, place, and time. She appears well-developed.   Eyes: EOM are normal.   Neck: Normal range of motion.   Cardiovascular: Normal rate, regular rhythm and normal heart sounds.    Pulmonary/Chest: Effort normal and breath sounds normal.   Abdominal: Soft. There is no tenderness. There is no rebound and no guarding.   Musculoskeletal: Normal range of motion.   Neurological: She is alert and oriented to person, place, and time.   Skin: No rash noted.   Vitals reviewed.        Health Maintenance   Topic Date Due    Pneumococcal (65+) (2 of 2 - PPSV23) 04/26/2018 (Originally 1/22/2016)    Colonoscopy  03/16/2020    Mammogram  04/18/2020    DEXA SCAN  04/02/2021    Lipid Panel  03/07/2023    TETANUS VACCINE  02/16/2028    Hepatitis C Screening  Completed    Zoster Vaccine  Addressed    Influenza Vaccine  Completed         Assessment:     No diagnosis found.      Plan:      Problem List Items Addressed This Visit     None

## 2018-04-30 ENCOUNTER — CLINICAL SUPPORT (OUTPATIENT)
Dept: SMOKING CESSATION | Facility: CLINIC | Age: 68
End: 2018-04-30
Payer: COMMERCIAL

## 2018-04-30 ENCOUNTER — OFFICE VISIT (OUTPATIENT)
Dept: CARDIOLOGY | Facility: CLINIC | Age: 68
End: 2018-04-30
Payer: MEDICARE

## 2018-04-30 VITALS
BODY MASS INDEX: 23.65 KG/M2 | SYSTOLIC BLOOD PRESSURE: 142 MMHG | HEART RATE: 86 BPM | WEIGHT: 129.31 LBS | DIASTOLIC BLOOD PRESSURE: 76 MMHG

## 2018-04-30 DIAGNOSIS — Z89.511 HISTORY OF AMPUTATION OF RIGHT LOWER EXTREMITY THROUGH TIBIA AND FIBULA: ICD-10-CM

## 2018-04-30 DIAGNOSIS — I10 ESSENTIAL HYPERTENSION: ICD-10-CM

## 2018-04-30 DIAGNOSIS — I73.9 PVD (PERIPHERAL VASCULAR DISEASE): ICD-10-CM

## 2018-04-30 DIAGNOSIS — F17.210 LIGHT CIGARETTE SMOKER (1-9 CIGS/DAY): Primary | ICD-10-CM

## 2018-04-30 DIAGNOSIS — F17.200 TOBACCO DEPENDENCE: ICD-10-CM

## 2018-04-30 DIAGNOSIS — I25.10 CORONARY ARTERY DISEASE INVOLVING NATIVE CORONARY ARTERY OF NATIVE HEART WITHOUT ANGINA PECTORIS: Primary | ICD-10-CM

## 2018-04-30 DIAGNOSIS — E78.2 MIXED HYPERLIPIDEMIA: ICD-10-CM

## 2018-04-30 PROCEDURE — 99404 PREV MED CNSL INDIV APPRX 60: CPT | Mod: S$GLB,,,

## 2018-04-30 PROCEDURE — 3078F DIAST BP <80 MM HG: CPT | Mod: CPTII,S$GLB,, | Performed by: INTERNAL MEDICINE

## 2018-04-30 PROCEDURE — 3077F SYST BP >= 140 MM HG: CPT | Mod: CPTII,S$GLB,, | Performed by: INTERNAL MEDICINE

## 2018-04-30 PROCEDURE — 99214 OFFICE O/P EST MOD 30 MIN: CPT | Mod: S$GLB,,, | Performed by: INTERNAL MEDICINE

## 2018-04-30 PROCEDURE — 99999 PR PBB SHADOW E&M-EST. PATIENT-LVL III: CPT | Mod: PBBFAC,,, | Performed by: INTERNAL MEDICINE

## 2018-04-30 PROCEDURE — 99999 PR PBB SHADOW E&M-EST. PATIENT-LVL I: CPT | Mod: PBBFAC,,,

## 2018-04-30 RX ORDER — CILOSTAZOL 50 MG/1
50 TABLET ORAL 2 TIMES DAILY
Qty: 60 TABLET | Refills: 4 | Status: SHIPPED | OUTPATIENT
Start: 2018-04-30 | End: 2019-05-02

## 2018-04-30 RX ORDER — IBUPROFEN 200 MG
1 TABLET ORAL DAILY
Qty: 14 PATCH | Refills: 0 | Status: SHIPPED | OUTPATIENT
Start: 2018-04-30 | End: 2018-05-14 | Stop reason: SDUPTHER

## 2018-04-30 RX ORDER — ASPIRIN 325 MG
325 TABLET ORAL DAILY
COMMUNITY

## 2018-04-30 NOTE — Clinical Note
Patient is smoking 10 cpd. Patient will attempt 6 cpd this week. Patient remains on prescribed tobacco cessation medication regimen of 21 mg patch and 2 mg nicotine lozenge without any negative side effects at this time. The patient denies any abnormal behavioral or mental changes at this time.

## 2018-04-30 NOTE — PROGRESS NOTES
Individual Follow-Up Form    4/30/2018    Quit Date:     Clinical Status of Patient: Outpatient    Length of Service: 60 minutes    Continuing Medication: yes  Patches    Other Medications: nicotine Lozenge     Target Symptoms: Withdrawal and medication side effects. The following were  rated moderate (3) to severe (4) on TCRS:  · Moderate (3): none  · Severe (4): none    Comments: Patient is smoking 10 cpd. Patient will attempt 6 cpd this week. Patient remains on prescribed tobacco cessation medication regimen of 21 mg patch and 2 mg nicotine lozenge without any negative side effects at this time. The patient denies any abnormal behavioral or mental changes at this time. The patient will continue with group therapy sessions and medication monitoring by CTTS. Prescribed medication management will be by physician. completion of TCRS (Tobacco Cessation Rating Scale) learned addiction model, cues/triggers, personal reasons for quitting, medications, goals, quit date      Diagnosis: F17.210    Next Visit: 1 week

## 2018-04-30 NOTE — PROGRESS NOTES
Subjective:   Patient ID:  Roma Dunbar is a 67 y.o. female who presents for follow-up of No chief complaint on file.      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 She denies chest pain and dyspnea and can walk 2 blocks. She however continue to have pain in legs with exertion more than 2 blocks. She continues to smoke and have been enrolled in smoking cessation classes. She is smoking 1 pck daily. Pletal restarted. BP controlled today. '  She said she is compliant with medications and tolerating medications.       Review of Systems   Constitution: Negative.   HENT: Negative.    Eyes: Negative.    Cardiovascular: Negative.    Respiratory: Negative.    Endocrine: Negative.    Hematologic/Lymphatic: Negative.    Skin: Negative.    Musculoskeletal: Negative.    Gastrointestinal: Negative.    Neurological: Negative.        Patient's Medications   New Prescriptions    No medications on file   Previous Medications    ASPIRIN 325 MG TABLET    Take 325 mg by mouth once daily.    LISINOPRIL-HYDROCHLOROTHIAZIDE (PRINZIDE,ZESTORETIC) 20-12.5 MG PER TABLET    Take 1 tablet by mouth 2 (two) times daily.    METOPROLOL SUCCINATE (TOPROL-XL) 25 MG 24 HR TABLET    Take 1 tablet (25 mg total) by mouth 2 (two) times daily.    NICOTINE (NICODERM CQ) 21 MG/24 HR    Place 1 patch onto the skin once daily.    NICOTINE POLACRILEX 2 MG LOZG    Take 1 lozenge (2 mg total) by mouth as needed (Use in place of cigarettes).    PANTOPRAZOLE (PROTONIX) 40 MG GRPS    Take 1  packet (40 mg total) by mouth once daily.    SERTRALINE (ZOLOFT) 50 MG TABLET    Take 1 tablet (50 mg total) by mouth once daily.    SIMVASTATIN (ZOCOR) 20 MG TABLET    Take 1 tablet (20 mg total) by mouth every evening.   Modified Medications    Modified Medication Previous Medication    CILOSTAZOL (PLETAL) 50 MG TAB cilostazol (PLETAL) 50 MG Tab       Take 1 tablet (50 mg total) by mouth 2 (two) times daily.    Take 1 tablet (50 mg total) by mouth 2 (two) times daily.   Discontinued Medications    ASPIRIN 81 MG CHEW    Take 1 tablet (81 mg total) by mouth once daily.       Objective:   Physical Exam   Constitutional: She is oriented to person, place, and time. She appears well-developed and well-nourished. No distress.   Examination of the digits showed no clubbing or cyanosis   HENT:   Head: Normocephalic and atraumatic.   Eyes: Conjunctivae are normal. Pupils are equal, round, and reactive to light. Right eye exhibits no discharge.   Neck: Normal range of motion. Neck supple. No JVD present. No thyromegaly present.   No carotid bruits   Cardiovascular: Normal rate, regular rhythm, S1 normal, S2 normal, normal heart sounds, intact distal pulses and normal pulses.  PMI is not displaced.  Exam reveals no gallop, no friction rub and no opening snap.    No murmur heard.  Pulmonary/Chest: Effort normal and breath sounds normal. No respiratory distress. She has no wheezes. She has no rales. She exhibits no tenderness.   Abdominal: Soft. Bowel sounds are normal. She exhibits no distension and no mass. There is no tenderness. There is no guarding.   No hepatosplenomegaly   Musculoskeletal: Normal range of motion. She exhibits no edema or tenderness.   Lymphadenopathy:     She has no cervical adenopathy.   Neurological: She is alert and oriented to person, place, and time.   Skin: Skin is warm. No rash noted. She is not diaphoretic. No erythema.   Psychiatric: She has a normal mood and affect.   Nursing note and vitals  reviewed.      ECGs reviewed  LABS reviewed  Imaging including Echoes reviewed  CABG with 2 vessel LIMA to LAD and SVG to OM1 > 10 years ago.    Hemodynamic Results     LVEDP (Pre): 20 mmHg  LVEDP (Post): 20 mmHg  Ejection Fraction: 65%    E. Angiographic Results 2016     Diagnostic:          Patient has a right dominant coronary artery.        - Left Main Coronary Artery:             The LM is normal. There is MIKO 3 flow.     - Left Anterior Descending Artery:             The proximal LAD has a 80% stenosis. There is MIKO 3 flow.             The mid LAD has a 90% stenosis. There is MIKO 3 flow. Comptetitve flow noted distal to lesion from LIMA-LAD     - Left Circumflex Artery:             The ostial LCX has a 60% stenosis. There is MIKO 3 flow.             The mid LCX has a 80% stenosis. There is MIKO 3 flow. Small vessel and no comptetive flow noted.      - Right Coronary Artery:             The mid RCA has luminal irregularities has a 40% stenosis. There is MIKO 3 flow. Distally supplies collaterals to left sided circulation     - SVG To UNKNOWN:             The ostial SVG to UNKNOWN is occluded. There is MIKO 0 flow. Surgical marker on the aorta and the graft is likely to an OM     - LIMA To LAD:             The LIMA to LAD is normal. There is MIKO 3 flow.     - Common Femoral Artery:             The right CFA is normal.     - Superficial Femoral Artery:             The left ostial SFA has chronic total occlusion. There is MIKO 0 flow.    Assessment:     1. Coronary artery disease involving native coronary artery of native heart without angina pectoris    2. Essential hypertension    3. Mixed hyperlipidemia    4. PVD (peripheral vascular disease)    5. Tobacco dependence    6. PVD (peripheral vascular disease) Well controlled   7. 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 Also discussion with patient as to present condition.

## 2018-05-10 ENCOUNTER — CLINICAL SUPPORT (OUTPATIENT)
Dept: SMOKING CESSATION | Facility: CLINIC | Age: 68
End: 2018-05-10
Payer: COMMERCIAL

## 2018-05-10 DIAGNOSIS — F17.210 MODERATE CIGARETTE SMOKER (10-19 PER DAY): Primary | ICD-10-CM

## 2018-05-10 PROCEDURE — 99999 PR PBB SHADOW E&M-EST. PATIENT-LVL I: CPT | Mod: PBBFAC,,,

## 2018-05-10 PROCEDURE — 99407 BEHAV CHNG SMOKING > 10 MIN: CPT | Mod: S$GLB,,,

## 2018-05-14 RX ORDER — IBUPROFEN 200 MG
1 TABLET ORAL DAILY
Qty: 14 PATCH | Refills: 0 | Status: SHIPPED | OUTPATIENT
Start: 2018-05-14 | End: 2018-06-19

## 2018-06-05 ENCOUNTER — OFFICE VISIT (OUTPATIENT)
Dept: INTERNAL MEDICINE | Facility: CLINIC | Age: 68
End: 2018-06-05
Payer: MEDICARE

## 2018-06-05 VITALS
SYSTOLIC BLOOD PRESSURE: 138 MMHG | HEIGHT: 62 IN | OXYGEN SATURATION: 98 % | BODY MASS INDEX: 23.93 KG/M2 | WEIGHT: 130.06 LBS | HEART RATE: 70 BPM | DIASTOLIC BLOOD PRESSURE: 88 MMHG

## 2018-06-05 DIAGNOSIS — I73.9 CLAUDICATION OF BOTH LOWER EXTREMITIES: Primary | ICD-10-CM

## 2018-06-05 DIAGNOSIS — Z72.0 TOBACCO USE: ICD-10-CM

## 2018-06-05 PROCEDURE — 99213 OFFICE O/P EST LOW 20 MIN: CPT | Mod: S$GLB,,, | Performed by: INTERNAL MEDICINE

## 2018-06-05 PROCEDURE — 99214 OFFICE O/P EST MOD 30 MIN: CPT | Mod: PBBFAC,PO | Performed by: INTERNAL MEDICINE

## 2018-06-05 PROCEDURE — 99999 PR PBB SHADOW E&M-EST. PATIENT-LVL IV: CPT | Mod: PBBFAC,,, | Performed by: INTERNAL MEDICINE

## 2018-06-05 NOTE — PROGRESS NOTES
"Subjective:       Patient ID: Roma Dunbar is a 67 y.o. female.    Chief Complaint: Leg Injury (leg pain from both legs from nub to where thigh and groin connects )    HPI Mrs. Dunbar is a 67 year old female with PAD (s/p BKA of RLE) who presents with a chief complaint of pain.  The patient states that her pain started on Saturday.  It has been constant since that time, but it did not become severe until today.  She walked about half a block earlier today and she began to feel a pain that she describes as "shooting".  This pain is located in her right lower extremity and left lower extremity.  Pain is 10/10 in severity.  It is associated with numbness. It is relieved by rest.    Review of Systems   Musculoskeletal: Positive for myalgias.       Objective:      Physical Exam   Constitutional: She is oriented to person, place, and time. She appears well-developed and well-nourished. No distress.   HENT:   Head: Normocephalic and atraumatic.   Eyes: Conjunctivae and EOM are normal.   Musculoskeletal: She exhibits no edema or deformity.   Thin hairless LLE. Unable to palpate DP pulse. Right lower extremity with BKA, has prosthetic in place   Neurological: She is alert and oriented to person, place, and time.   Skin: Skin is warm and dry. She is not diaphoretic.   Psychiatric: She has a normal mood and affect. Her behavior is normal. Judgment and thought content normal.   Vitals reviewed.      Assessment:       1. Claudication of both lower extremities    2. Tobacco use        Plan:     #1 claudication in both lower extremities  Arterial ultrasound of bilateral lower extremities ordered  Referral placed to Dr. Neely  Continue current medications (aspirin, statin, cilostazol)    #2 Tobacco use  Patient is participating in smoking cessation.  Encourage continued disuse of tobacco    RTC PRN        "

## 2018-06-15 ENCOUNTER — HOSPITAL ENCOUNTER (EMERGENCY)
Facility: HOSPITAL | Age: 68
Discharge: HOME | End: 2018-06-15
Attending: EMERGENCY MEDICINE
Payer: MEDICARE

## 2018-06-15 ENCOUNTER — TELEPHONE (OUTPATIENT)
Dept: INTERNAL MEDICINE | Facility: CLINIC | Age: 68
End: 2018-06-15

## 2018-06-15 VITALS
TEMPERATURE: 98 F | DIASTOLIC BLOOD PRESSURE: 77 MMHG | HEART RATE: 72 BPM | HEIGHT: 62 IN | OXYGEN SATURATION: 96 % | BODY MASS INDEX: 23.92 KG/M2 | WEIGHT: 130 LBS | SYSTOLIC BLOOD PRESSURE: 180 MMHG

## 2018-06-15 DIAGNOSIS — I73.9 INTERMITTENT CLAUDICATION: Primary | ICD-10-CM

## 2018-06-15 DIAGNOSIS — I73.9 PAD (PERIPHERAL ARTERY DISEASE): ICD-10-CM

## 2018-06-15 PROCEDURE — 99283 EMERGENCY DEPT VISIT LOW MDM: CPT

## 2018-06-15 RX ORDER — LIDOCAINE HYDROCHLORIDE 20 MG/ML
5 JELLY TOPICAL
Status: DISCONTINUED | OUTPATIENT
Start: 2018-06-15 | End: 2018-06-15

## 2018-06-15 RX ORDER — LIDOCAINE HYDROCHLORIDE 20 MG/ML
5 SOLUTION OROPHARYNGEAL
Status: DISCONTINUED | OUTPATIENT
Start: 2018-06-15 | End: 2018-06-15

## 2018-06-15 NOTE — TELEPHONE ENCOUNTER
----- Message from Zari Crenshaw sent at 6/15/2018  8:59 AM CDT -----  Contact: [phone]/ self   Pt its requesting Dr Salazar to put the order for an artery ultrasound . Pt wants to get the ultrasound done this morning and it needs to be put in as an emergency . Please advise

## 2018-06-15 NOTE — TELEPHONE ENCOUNTER
Pt is asking for US order to be put in as stat but pt is admitted into hospital, called pt no answer, left message stating that I called and that she needs to call me back at [phone]!

## 2018-06-15 NOTE — TELEPHONE ENCOUNTER
----- Message from Zari Crenshaw sent at 6/15/2018  8:59 AM CDT -----  Contact: [phone]/ self   Pt its requesting Dr Salazar to put the order for an artery ultrasound . Pt wants to get the ultrasound done this morning and it needs to be put in as an emergency . Please advise

## 2018-06-15 NOTE — ED NOTES
APPEARANCE: Alert, oriented and in no acute distress.  CARDIAC: Normal rate and rhythm, no murmur heard.   PERIPHERAL VASCULAR: peripheral pulses present. Normal cap refill. No edema. Warm to touch. Right BKA with prothesis  RESPIRATORY:Normal rate and effort, breath sounds clear bilaterally throughout chest. Respirations are equal and unlabored no obvious signs of distress.  GASTRO: soft, bowel sounds normal, no tenderness, no abdominal distention.  MUSC: Limited ROM due to Right BKA. No bony tenderness or soft tissue tenderness.   SKIN: Skin is warm and dry, normal skin turgor, mucous membranes moist.  NEURO: 5/5 strength major flexors/extensors bilaterally. Sensory intact to light touch bilaterally. [place] coma scale: eyes open spontaneously-4, oriented & converses-5, obeys commands-6. No neurological abnormalities.   MENTAL STATUS: awake, alert and aware of environment.  EYE: PERRL, both eyes: pupils brisk and reactive to light. Normal size.  ENT: EARS: no obvious drainage. NOSE: no active bleeding.   Pt complaining of bilateral leg pain for several days.

## 2018-06-15 NOTE — ED PROVIDER NOTES
Encounter Date: 6/15/2018    SCRIBE #1 NOTE: I, Kevin Castro, am scribing for, and in the presence of,  Dr. Nimesh Galvez. I have scribed the entire note.       History     Chief Complaint   Patient presents with    Leg Pain     bilateral leg pain, right worse than left.  Patient missed vascular appointment and was given another appointment for 6/20 and states she is unable to wait that long.     This is a 67 y.o. female who has a past medical history of Coronary artery disease; GERD;   Hyperlipidemia; Hypertension; and PAD (peripheral artery disease).   The patient presents to the Emergency Department with chronic b/l leg pain, R worse than L.   R leg pain from mid thigh to mid calf.   Pain is noted with walking and is relieved by rest.  Symptoms are associated with mild numbness, tingling in B/L LE.  Patient states that she occasionally gets spasms in both legs as well causing pain.  Pt had RLE amputation mid calf due to PAD secondary to hypertension and chronic tobacco abuse.  She has been taking  mg but did not take HTN meds today.   Pt had a scheduled US appointment yesterday but missed it and was rescheduled for 1 week from now but she did not want to wait so she came to the ED.  Pt has attempted to quit smoking several times unsuccessfully.  She has not seen her vascular surgeon Dr Ibarra in some time.  Pt has a past surgical history that includes Hysterectomy; BKA ( mid 2012); Coronary artery bypass graft.      The history is provided by the patient.     Review of patient's allergies indicates:  No Known Allergies  Past Medical History:   Diagnosis Date    Coronary artery disease     GERD (gastroesophageal reflux disease)     Herpes simplex without mention of complication     Hyperlipidemia     Hypertension     PAD (peripheral artery disease)      Past Surgical History:   Procedure Laterality Date    BKA   mid 2012    Right side at shin level amputation. 2/2 PVD    CARDIAC SURGERY       CORONARY ARTERY BYPASS GRAFT      before [place]    HYSTERECTOMY       Family History   Problem Relation Age of Onset    Hypertension Mother     Diabetes Mother     Colon cancer Maternal Grandmother      Social History   Substance Use Topics    Smoking status: Current Some Day Smoker     Packs/day: 1.50     Years: 40.00     Types: Cigarettes    Smokeless tobacco: Never Used    Alcohol use Yes      Comment: beer on weekends     Review of Systems   Constitutional: Negative for activity change, appetite change, chills, diaphoresis, fatigue and fever.   HENT: Negative for sore throat.    Respiratory: Negative for cough, chest tightness and shortness of breath.    Cardiovascular: Negative for chest pain and palpitations.   Gastrointestinal: Negative for abdominal distention, abdominal pain, diarrhea, nausea and vomiting.   Endocrine: Negative for polydipsia and polyphagia.   Genitourinary: Negative for difficulty urinating, dysuria and flank pain.   Musculoskeletal: Negative for arthralgias.        B/l LE pain, weakness, tingling   Skin: Negative for pallor and rash.   Neurological: Negative for dizziness and headaches.   Psychiatric/Behavioral: Negative for confusion.       Physical Exam     Initial Vitals [06/15/18 1030]   BP Pulse Resp Temp SpO2   (!) 227/98 73 -- 98 °F (36.7 °C) 96 %      MAP       --         Physical Exam    Nursing note and vitals reviewed.  Constitutional: She appears well-developed and well-nourished. No distress.   HENT:   Head: Normocephalic and atraumatic.   Eyes: Conjunctivae are normal.   Neck: Normal range of motion.   Cardiovascular: Normal rate, regular rhythm and normal heart sounds.   No murmur heard.  1+ femoral pulses b/l   unable to palpate popliteal pulse b/l.  Unable palpate DP/PT left lower extremity    no doppler signal in DP or PT LLE  positive doppler politeal bilaterally   Pulmonary/Chest: Breath sounds normal. No respiratory distress.   Abdominal: Bowel sounds are  normal. She exhibits no distension.   Musculoskeletal: Normal range of motion.   BKA on R,   L foot cool toes, cap refill 3 seconds, couldn't palpate pulse.   no wounds or significant skin changes.   Neurological: She is alert and oriented to person, place, and time.   Skin: Skin is dry. Capillary refill takes 2 to 3 seconds.   Distal left lower extremity is cool to touch   Psychiatric: She has a normal mood and affect. Her behavior is normal.         ED Course   Procedures  Labs Reviewed - No data to display       No orders to display        Medical Decision Making:   Initial Assessment:   Patient is a 67 y.o. female presenting to ED with bilateral lower extremity pain with ambulation associated with occasional numbness and paresthesias.  Overall impression intermittent claudication.  No rest pain at this time.  No emergent need for further workup or admission for intervention at this time.  I discussed the case with Dr. Ibarra, who stated that he would follow up with patient after arterial ultrasound.  Counseled on smoking cessation, encouaged medication compliance, f/u with cardiology and vascular surgery.                       Clinical Impression:     1. Intermittent claudication    2. PAD (peripheral artery disease)                 I, Dr. Nimesh Galvez, personally performed the services described in this documentation.   All medical record entries made by the scribe were at my direction and in my presence.   I have reviewed the chart and agree that the record is accurate and complete.   Nimesh Galvez MD.          Nimesh Galvez MD  06/15/18 8016

## 2018-06-15 NOTE — DISCHARGE INSTRUCTIONS
PLEASE FOLLOW UP IN GET YOUR ULTRASOUND AS SCHEDULED IN THE NEXT WEEK.  FROM THERE, PLEASE FOLLOW-UP WITH DR. ALTAMIRANO AND/OR DR CALHOUN.    Thank you for choosing [facility]! We appreciate you coming to us for your medical care. We hope you feel better soon! Please come back to [facility] for all of your future medical needs.      Sincerely,    Nimesh Galvez MD  Medical Director  Emergency Department

## 2018-06-18 ENCOUNTER — CLINICAL SUPPORT (OUTPATIENT)
Dept: SMOKING CESSATION | Facility: CLINIC | Age: 68
End: 2018-06-18
Payer: COMMERCIAL

## 2018-06-18 DIAGNOSIS — F17.200 NICOTINE DEPENDENCE: Primary | ICD-10-CM

## 2018-06-18 PROCEDURE — 99407 BEHAV CHNG SMOKING > 10 MIN: CPT | Mod: S$GLB,,,

## 2018-06-18 NOTE — PROGRESS NOTES
Successful contact with patient regarding tobacco cessation quit #1. Pt states, she continue to smoke 10 cigarettes/day. Pt's not ready to return to the program at this time. Pt provided with her current and next available benefit status and contact information to schedule an appointment when she's ready. Will follow up with her progress in 3-4 months.

## 2018-06-19 ENCOUNTER — OFFICE VISIT (OUTPATIENT)
Dept: CARDIOLOGY | Facility: CLINIC | Age: 68
End: 2018-06-19
Payer: MEDICARE

## 2018-06-19 VITALS
DIASTOLIC BLOOD PRESSURE: 80 MMHG | SYSTOLIC BLOOD PRESSURE: 152 MMHG | HEART RATE: 75 BPM | BODY MASS INDEX: 23.87 KG/M2 | OXYGEN SATURATION: 97 % | WEIGHT: 129.69 LBS | HEIGHT: 62 IN

## 2018-06-19 DIAGNOSIS — F17.210 MODERATE CIGARETTE SMOKER (10-19 PER DAY): ICD-10-CM

## 2018-06-19 DIAGNOSIS — I73.9 PVD (PERIPHERAL VASCULAR DISEASE): ICD-10-CM

## 2018-06-19 DIAGNOSIS — E78.2 MIXED HYPERLIPIDEMIA: ICD-10-CM

## 2018-06-19 DIAGNOSIS — I25.10 CORONARY ARTERY DISEASE INVOLVING NATIVE CORONARY ARTERY OF NATIVE HEART WITHOUT ANGINA PECTORIS: ICD-10-CM

## 2018-06-19 DIAGNOSIS — I10 ESSENTIAL HYPERTENSION: ICD-10-CM

## 2018-06-19 DIAGNOSIS — I70.213 ATHEROSCLEROSIS OF NATIVE ARTERY OF BOTH LOWER EXTREMITIES WITH INTERMITTENT CLAUDICATION: Primary | ICD-10-CM

## 2018-06-19 PROCEDURE — 99999 PR PBB SHADOW E&M-EST. PATIENT-LVL III: CPT | Mod: PBBFAC,,, | Performed by: INTERNAL MEDICINE

## 2018-06-19 PROCEDURE — 99213 OFFICE O/P EST LOW 20 MIN: CPT | Mod: PBBFAC,PO | Performed by: INTERNAL MEDICINE

## 2018-06-19 PROCEDURE — 99214 OFFICE O/P EST MOD 30 MIN: CPT | Mod: S$GLB,,, | Performed by: INTERNAL MEDICINE

## 2018-06-19 RX ORDER — IBUPROFEN 200 MG
1 TABLET ORAL DAILY
Qty: 14 PATCH | Refills: 0 | Status: SHIPPED | OUTPATIENT
Start: 2018-06-19

## 2018-06-19 NOTE — PROGRESS NOTES
Subjective:   Patient ID:  Roma Dunbar is a 67 y.o. female who presents for follow-up of Claudication      Problem List Items Addressed This Visit        Cardiac/Vascular    Essential hypertension    Hyperlipidemia    PVD (peripheral vascular disease)    Coronary artery disease involving native coronary artery without angina pectoris    Atherosclerosis of both lower extremities with intermittent claudication - Primary          HPI: Patient is here for f/u of the above medical problems. She has complaints of leg claudication that only occurs with walking. Pain usually occurs after 25-50 feet. She matias shave history of PAD with right BKA. No rest pain or ulcers. She denies chest pain and dyspnea. She continues to smoke and have been enrolled in smoking cessation classes. She is smoking 1 pck daily.  BP is mildly elevated. She is compliant with medications and is on pletal.       Review of Systems   Constitution: Negative.   HENT: Negative.    Eyes: Negative.    Cardiovascular: Negative.    Respiratory: Negative.    Endocrine: Negative.    Hematologic/Lymphatic: Negative.    Skin: Negative.    Musculoskeletal: Negative.    Gastrointestinal: Negative.    Neurological: Negative.        Patient's Medications   New Prescriptions    No medications on file   Previous Medications    ASPIRIN 325 MG TABLET    Take 325 mg by mouth once daily.    CILOSTAZOL (PLETAL) 50 MG TAB    Take 1 tablet (50 mg total) by mouth 2 (two) times daily.    LISINOPRIL-HYDROCHLOROTHIAZIDE (PRINZIDE,ZESTORETIC) 20-12.5 MG PER TABLET    Take 1 tablet by mouth 2 (two) times daily.    NICOTINE (NICODERM CQ) 21 MG/24 HR    Place 1 patch onto the skin once daily.    NICOTINE POLACRILEX 2 MG LOZG    Take 1 lozenge (2 mg total) by mouth as needed (Use in place of cigarettes).    SIMVASTATIN (ZOCOR) 20 MG TABLET    Take 1 tablet (20 mg total) by mouth every evening.   Modified Medications    No medications on file   Discontinued Medications    No medications  on file       Objective:   Physical Exam   Constitutional: She is oriented to person, place, and time. She appears well-developed and well-nourished. No distress.   Examination of the digits showed no clubbing or cyanosis   HENT:   Head: Normocephalic and atraumatic.   Eyes: Conjunctivae are normal. Pupils are equal, round, and reactive to light. Right eye exhibits no discharge.   Neck: Normal range of motion. Neck supple. No JVD present. No thyromegaly present.   No carotid bruits   Cardiovascular: Normal rate, regular rhythm, S1 normal, S2 normal, normal heart sounds, intact distal pulses and normal pulses.  PMI is not displaced.  Exam reveals no gallop, no friction rub and no opening snap.    No murmur heard.  Pulmonary/Chest: Effort normal and breath sounds normal. No respiratory distress. She has no wheezes. She has no rales. She exhibits no tenderness.   Abdominal: Soft. Bowel sounds are normal. She exhibits no distension and no mass. There is no tenderness. There is no guarding.   No hepatosplenomegaly   Musculoskeletal: Normal range of motion. She exhibits no edema or tenderness.   Lymphadenopathy:     She has no cervical adenopathy.   Neurological: She is alert and oriented to person, place, and time.   Skin: Skin is warm. No rash noted. She is not diaphoretic. No erythema.   Psychiatric: She has a normal mood and affect.   Nursing note and vitals reviewed.      ECGs reviewed-NSR  LABS reviewed  Imaging including Echoes reviewed  CABG with 2 vessel LIMA to LAD and SVG to OM1 > 10 years ago.    Hemodynamic Results     LVEDP (Pre): 20 mmHg  LVEDP (Post): 20 mmHg  Ejection Fraction: 65%    E. Angiographic Results 2016     Diagnostic:          Patient has a right dominant coronary artery.        - Left Main Coronary Artery:             The LM is normal. There is MIKO 3 flow.     - Left Anterior Descending Artery:             The proximal LAD has a 80% stenosis. There is MIKO 3 flow.             The mid LAD has  a 90% stenosis. There is MIKO 3 flow. Comptetitve flow noted distal to lesion from LIMA-LAD     - Left Circumflex Artery:             The ostial LCX has a 60% stenosis. There is MIKO 3 flow.             The mid LCX has a 80% stenosis. There is MIKO 3 flow. Small vessel and no comptetive flow noted.      - Right Coronary Artery:             The mid RCA has luminal irregularities has a 40% stenosis. There is MIKO 3 flow. Distally supplies collaterals to left sided circulation     - SVG To UNKNOWN:             The ostial SVG to UNKNOWN is occluded. There is MIKO 0 flow. Surgical marker on the aorta and the graft is likely to an OM     - LIMA To LAD:             The LIMA to LAD is normal. There is MIKO 3 flow.     - Common Femoral Artery:             The right CFA is normal.     - Superficial Femoral Artery:             The left ostial SFA has chronic total occlusion. There is MIKO 0 flow.    Assessment:     1. Atherosclerosis of native artery of both lower extremities with intermittent claudication    2. Essential hypertension    3. Mixed hyperlipidemia    4. PVD (peripheral vascular disease)    5. Coronary artery disease involving native coronary artery of native heart without angina pectoris        Plan:   Leg claudication likely worsening PAD. Will follow up on US results to decide on angiogram of legs.   Continue current medications  Follow up on US of legs  Activity as tolerated  Smoking cessation class  F/u in 6 months    > 20 minutes was spent in the care of this patient for evaluation, critical thinking and decision making. Also discussion with patient as to present condition.

## 2018-06-19 NOTE — LETTER
June 19, 2018      Carey Salazar MD  [address] [address]           [facility]  [address], [address]  [facility] [address]  Phone: [phone]          Patient: Roma Dunbar   MR Number: [id number]   YOB: 1950   Date of Visit: 6/19/2018       Dear Dr. Carey Salazar:    Thank you for referring Roma Dunbar to me for evaluation. Attached you will find relevant portions of my assessment and plan of care.    If you have questions, please do not hesitate to call me. I look forward to following Roma Dunbar along with you.    Sincerely,    Gema Ramirez MD    Enclosure  CC:  No Recipients    If you would like to receive this communication electronically, please contact [email or web address] or [phone] to request more information on [employer] Link access.    For providers and/or their staff who would like to refer a patient to [facility], please contact us through our one-stop-shop provider referral line, [facility], at [phone].    If you feel you have received this communication in error or would no longer like to receive these types of communications, please e-mail [email or web address]

## 2018-06-21 ENCOUNTER — HOSPITAL ENCOUNTER (OUTPATIENT)
Dept: RADIOLOGY | Facility: HOSPITAL | Age: 68
Discharge: HOME | DRG: 301 | End: 2018-06-21
Attending: INTERNAL MEDICINE
Payer: MEDICARE

## 2018-06-21 ENCOUNTER — HOSPITAL ENCOUNTER (OUTPATIENT)
Facility: HOSPITAL | Age: 68
Discharge: HOME | DRG: 301 | End: 2018-06-23
Attending: EMERGENCY MEDICINE | Admitting: INTERNAL MEDICINE
Payer: MEDICARE

## 2018-06-21 ENCOUNTER — TELEPHONE (OUTPATIENT)
Dept: FAMILY MEDICINE | Facility: CLINIC | Age: 68
End: 2018-06-21

## 2018-06-21 DIAGNOSIS — I73.9 CLAUDICATION OF BOTH LOWER EXTREMITIES: ICD-10-CM

## 2018-06-21 DIAGNOSIS — I70.90 ATHEROSCLEROSIS: ICD-10-CM

## 2018-06-21 DIAGNOSIS — I70.213 ATHEROSCLEROSIS OF NATIVE ARTERY OF BOTH LOWER EXTREMITIES WITH INTERMITTENT CLAUDICATION: ICD-10-CM

## 2018-06-21 DIAGNOSIS — M79.609 STUMP PAIN: ICD-10-CM

## 2018-06-21 DIAGNOSIS — I70.90 ARTERIAL OCCLUSION: ICD-10-CM

## 2018-06-21 DIAGNOSIS — T87.89 STUMP PAIN: ICD-10-CM

## 2018-06-21 DIAGNOSIS — I73.9 PVD (PERIPHERAL VASCULAR DISEASE): Primary | ICD-10-CM

## 2018-06-21 DIAGNOSIS — I10 ESSENTIAL (PRIMARY) HYPERTENSION: ICD-10-CM

## 2018-06-21 LAB
ANION GAP SERPL CALC-SCNC: 12 MMOL/L
BUN SERPL-MCNC: 13 MG/DL
CALCIUM SERPL-MCNC: 10.6 MG/DL
CHLORIDE SERPL-SCNC: 103 MMOL/L
CO2 SERPL-SCNC: 23 MMOL/L
CREAT SERPL-MCNC: 0.8 MG/DL
ERYTHROCYTE [DISTWIDTH] IN BLOOD BY AUTOMATED COUNT: 14.2 %
EST. GFR  (AFRICAN AMERICAN): >60 ML/MIN/1.73 M^2
EST. GFR  (NON AFRICAN AMERICAN): >60 ML/MIN/1.73 M^2
GLUCOSE SERPL-MCNC: 76 MG/DL
HCT VFR BLD AUTO: 41.9 %
HGB BLD-MCNC: 13.9 G/DL
MCH RBC QN AUTO: 29.8 PG
MCHC RBC AUTO-ENTMCNC: 33.2 G/DL
MCV RBC AUTO: 90 FL
PLATELET # BLD AUTO: 150 K/UL
PMV BLD AUTO: 12.3 FL
POTASSIUM SERPL-SCNC: 3.5 MMOL/L
RBC # BLD AUTO: 4.66 M/UL
SODIUM SERPL-SCNC: 138 MMOL/L
WBC # BLD AUTO: 6.52 K/UL

## 2018-06-21 PROCEDURE — 93925 LOWER EXTREMITY STUDY: CPT | Mod: 26,,, | Performed by: RADIOLOGY

## 2018-06-21 PROCEDURE — G0378 HOSPITAL OBSERVATION PER HR: HCPCS

## 2018-06-21 PROCEDURE — 99285 EMERGENCY DEPT VISIT HI MDM: CPT

## 2018-06-21 PROCEDURE — 80048 BASIC METABOLIC PNL TOTAL CA: CPT

## 2018-06-21 PROCEDURE — 21400001 HC TELEMETRY ROOM

## 2018-06-21 PROCEDURE — 85027 COMPLETE CBC AUTOMATED: CPT

## 2018-06-21 PROCEDURE — 25000003 PHARM REV CODE 250: Performed by: INTERNAL MEDICINE

## 2018-06-21 PROCEDURE — 93925 LOWER EXTREMITY STUDY: CPT | Mod: TC

## 2018-06-21 RX ORDER — CLOPIDOGREL BISULFATE 75 MG/1
300 TABLET ORAL ONCE
Status: COMPLETED | OUTPATIENT
Start: 2018-06-21 | End: 2018-06-21

## 2018-06-21 RX ORDER — ATORVASTATIN CALCIUM 10 MG/1
10 TABLET, FILM COATED ORAL DAILY
Status: DISCONTINUED | OUTPATIENT
Start: 2018-06-22 | End: 2018-06-23 | Stop reason: HOSPADM

## 2018-06-21 RX ORDER — DIPHENHYDRAMINE HCL 50 MG
50 CAPSULE ORAL ONCE
Status: COMPLETED | OUTPATIENT
Start: 2018-06-22 | End: 2018-06-22

## 2018-06-21 RX ORDER — SODIUM CHLORIDE 9 MG/ML
INJECTION, SOLUTION INTRAVENOUS CONTINUOUS
Status: DISCONTINUED | OUTPATIENT
Start: 2018-06-22 | End: 2018-06-23 | Stop reason: HOSPADM

## 2018-06-21 RX ORDER — IBUPROFEN 200 MG
1 TABLET ORAL DAILY
Status: DISCONTINUED | OUTPATIENT
Start: 2018-06-22 | End: 2018-06-23 | Stop reason: HOSPADM

## 2018-06-21 RX ORDER — HYDROCODONE BITARTRATE AND ACETAMINOPHEN 7.5; 325 MG/1; MG/1
1 TABLET ORAL EVERY 6 HOURS PRN
Status: DISCONTINUED | OUTPATIENT
Start: 2018-06-21 | End: 2018-06-23 | Stop reason: HOSPADM

## 2018-06-21 RX ORDER — CILOSTAZOL 50 MG/1
50 TABLET ORAL 2 TIMES DAILY
Status: DISCONTINUED | OUTPATIENT
Start: 2018-06-21 | End: 2018-06-23 | Stop reason: HOSPADM

## 2018-06-21 RX ORDER — CLOPIDOGREL BISULFATE 75 MG/1
75 TABLET ORAL DAILY
Status: DISCONTINUED | OUTPATIENT
Start: 2018-06-22 | End: 2018-06-23 | Stop reason: HOSPADM

## 2018-06-21 RX ORDER — MORPHINE SULFATE 2 MG/ML
2 INJECTION, SOLUTION INTRAMUSCULAR; INTRAVENOUS EVERY 4 HOURS PRN
Status: DISCONTINUED | OUTPATIENT
Start: 2018-06-21 | End: 2018-06-23 | Stop reason: HOSPADM

## 2018-06-21 RX ORDER — LISINOPRIL 20 MG/1
40 TABLET ORAL NIGHTLY
Status: DISCONTINUED | OUTPATIENT
Start: 2018-06-21 | End: 2018-06-23 | Stop reason: HOSPADM

## 2018-06-21 RX ADMIN — CLOPIDOGREL BISULFATE 300 MG: 75 TABLET ORAL at 07:06

## 2018-06-21 RX ADMIN — LISINOPRIL 40 MG: 20 TABLET ORAL at 09:06

## 2018-06-21 RX ADMIN — CILOSTAZOL 50 MG: 50 TABLET ORAL at 09:06

## 2018-06-21 NOTE — TELEPHONE ENCOUNTER
Received a phone call from the outpatient radiologist performing her arterial ultrasounds of lower extremities.  Patient has occlusion of her graft on the right, as well as occlusion of the artery on her left.  I asked that the radiologist tell the patient to proceed to the emergency department.  Radiologist says that he will speak to her and tell her to go to the emergency department.

## 2018-06-21 NOTE — ED PROVIDER NOTES
Encounter Date: 6/21/2018       History     Chief Complaint   Patient presents with    abnormal ultrasound     pt presents to ED today reports she was informed her ultrasound results were abnormal and possibly has a blood clot.      Roma Dunbar is a 67 y.o. female who  has a past medical history of Coronary artery disease; GERD (gastroesophageal reflux disease); Herpes simplex without mention of complication; Hyperlipidemia; Hypertension; and PAD (peripheral artery disease).    Patient presents to the ED due to pain in the LLE and abnormal US results. Pt was seen in this ED last week for bilateral leg pain. Pt has R BKA but wears a prosthetic. She feels numbness and tingling in the b/l LE and has had increased leg weakness.         The history is provided by the patient.     Review of patient's allergies indicates:  No Known Allergies  Past Medical History:   Diagnosis Date    Coronary artery disease     GERD (gastroesophageal reflux disease)     Herpes simplex without mention of complication     Hyperlipidemia     Hypertension     PAD (peripheral artery disease)      Past Surgical History:   Procedure Laterality Date    BKA   mid 2012    Right side at shin level amputation. 2/2 PVD    CARDIAC SURGERY      CORONARY ARTERY BYPASS GRAFT      before [place]    HYSTERECTOMY       Family History   Problem Relation Age of Onset    Hypertension Mother     Diabetes Mother     Colon cancer Maternal Grandmother      Social History   Substance Use Topics    Smoking status: Current Some Day Smoker     Packs/day: 1.50     Years: 40.00     Types: Cigarettes    Smokeless tobacco: Never Used    Alcohol use Yes      Comment: beer on weekends     Review of Systems    Physical Exam     Initial Vitals [06/21/18 1627]   BP Pulse Resp Temp SpO2   (!) 168/61 70 19 98.5 °F (36.9 °C) --      MAP       --         Physical Exam    Nursing note and vitals reviewed.  Constitutional: She appears well-developed and  well-nourished. She is not diaphoretic. No distress.   HENT:   Head: Normocephalic and atraumatic.   Eyes: Pupils are equal, round, and reactive to light.   Neck: Normal range of motion. Neck supple.   Abdominal: She exhibits no distension.   Musculoskeletal: Normal range of motion.   RLE:  BKA  decreased sensation in stump, nml temperature, skin intact    LLE  L foot warm, no palpable DP or PT pulses, sensation intact, toes and ankle flexion without pain   Neurological: She is alert and oriented to person, place, and time.   Skin: Skin is dry.         ED Course   Procedures  Labs Reviewed - No data to display       Imaging Results    None          Medical Decision Making:   History:   Old Medical Records: I decided to obtain old medical records.  Old Records Summarized: other records.       <> Summary of Records:     Per outpatient US this afternoon:    EXAMINATION:  US LOWER EXTREMITY ARTERIES BILATERAL  TIME: 16:02    CLINICAL HISTORY:  Peripheral vascular disease, unspecified    TECHNIQUE:  Bilateral spectral, color and grayscale images of the large arteries of both lower extremities were performed.    COMPARISON:  None    FINDINGS:  Right lower extremity:    There is no visualized flow throughout the arterial structures of the right lower extremity including the femoral popliteal bypass graft.  There is a 2.7 x 1.2 x 0.8 cm focus of fluid seen along the distal portion of the graft.    Left lower extremity:    The common femoral artery has a monophasic waveform with 114 cm/second velocity    There is no flow visualized in the superficial femoral artery with reconstitution of flow seen at the popliteal artery with a velocity is 109 centimeters/second with a monophasic waveform.    Posterior tibial artery has a velocity of 79 centimeters/second with a monophasic waveform.    Anterior tibial artery has a 56 cm per 2nd velocity with monophasic waveform.    The dorsal pedal artery as a 14.9 centimeter/second velocity  with a monophasic waveform.  Impression       No visualized arterial flow in the right lower extremity with an occluded bypass graft; there is a focus of complex fluid seen along the distal portion of the graft which may represent seroma or old blood products.    Left lower extremity demonstrates complete occlusion of the superficial femoral artery; there is flow visualized in the common femoral artery as well as in the popliteal artery and below the feet all with monophasic waveform suggesting additional elements of hemodynamically significant stenosis.    If revascularization of the superficial femoral artery is desired consider consultation with interventional radiology.      Electronically signed by: Kyle Berger MD  Date: 06/21/2018  Time: 16:02                            Clinical Impression:   There were no encounter diagnoses.      Disposition:   Disposition: Admitted  Dr. Vieira called immediately on patient arrival case discussed with him he will call me back after reviewing patient's history.   5:11 p.m.  called me back and will admit the patient.  Ultrasound showing complete occlusion of right lower extremity bypass graft and complete occlusion of left lower extremity superficial femoral artery.    I, Dr. Kush Hancock, personally performed the services described in this documentation.   All medical record entries made by the scribe were at my direction and in my presence.   I have reviewed the chart and agree that the record is accurate and complete.   MD Kush Maloney MD  06/21/18 3239

## 2018-06-21 NOTE — ED TRIAGE NOTES
abnormal ultrasound (pt presents to ED today reports she was informed her ultrasound results were abnormal and possibly has a blood clot. )

## 2018-06-22 ENCOUNTER — SURGERY (OUTPATIENT)
Age: 68
End: 2018-06-22

## 2018-06-22 LAB
ANION GAP SERPL CALC-SCNC: 8 MMOL/L
APTT BLDCRRT: 27.6 SEC
BASOPHILS # BLD AUTO: 0.02 K/UL
BASOPHILS NFR BLD: 0.5 %
BUN SERPL-MCNC: 12 MG/DL
CALCIUM SERPL-MCNC: 10.1 MG/DL
CHLORIDE SERPL-SCNC: 107 MMOL/L
CO2 SERPL-SCNC: 27 MMOL/L
CREAT SERPL-MCNC: 0.8 MG/DL
DIFFERENTIAL METHOD: NORMAL
EOSINOPHIL # BLD AUTO: 0.2 K/UL
EOSINOPHIL NFR BLD: 4.3 %
ERYTHROCYTE [DISTWIDTH] IN BLOOD BY AUTOMATED COUNT: 14.2 %
EST. GFR  (AFRICAN AMERICAN): >60 ML/MIN/1.73 M^2
EST. GFR  (NON AFRICAN AMERICAN): >60 ML/MIN/1.73 M^2
GLUCOSE SERPL-MCNC: 77 MG/DL
HCT VFR BLD AUTO: 43.2 %
HGB BLD-MCNC: 14.1 G/DL
INR PPP: 1.1
LYMPHOCYTES # BLD AUTO: 1.2 K/UL
LYMPHOCYTES NFR BLD: 27 %
MCH RBC QN AUTO: 29.6 PG
MCHC RBC AUTO-ENTMCNC: 32.6 G/DL
MCV RBC AUTO: 91 FL
MONOCYTES # BLD AUTO: 0.4 K/UL
MONOCYTES NFR BLD: 9.5 %
NEUTROPHILS # BLD AUTO: 2.6 K/UL
NEUTROPHILS NFR BLD: 58.5 %
PLATELET # BLD AUTO: 151 K/UL
PMV BLD AUTO: 12.3 FL
POTASSIUM SERPL-SCNC: 4.3 MMOL/L
PROTHROMBIN TIME: 11.2 SEC
RBC # BLD AUTO: 4.77 M/UL
SODIUM SERPL-SCNC: 142 MMOL/L
WBC # BLD AUTO: 4.44 K/UL

## 2018-06-22 PROCEDURE — 27200085 CATH LAB PROCEDURE

## 2018-06-22 PROCEDURE — 93005 ELECTROCARDIOGRAM TRACING: CPT

## 2018-06-22 PROCEDURE — 75625 CONTRAST EXAM ABDOMINL AORTA: CPT | Mod: 26,ICN,, | Performed by: INTERNAL MEDICINE

## 2018-06-22 PROCEDURE — 63600175 PHARM REV CODE 636 W HCPCS

## 2018-06-22 PROCEDURE — 25000003 PHARM REV CODE 250: Performed by: INTERNAL MEDICINE

## 2018-06-22 PROCEDURE — 21400001 HC TELEMETRY ROOM

## 2018-06-22 PROCEDURE — 36245 INS CATH ABD/L-EXT ART 1ST: CPT | Mod: ICN,,, | Performed by: INTERNAL MEDICINE

## 2018-06-22 PROCEDURE — 94761 N-INVAS EAR/PLS OXIMETRY MLT: CPT

## 2018-06-22 PROCEDURE — 93010 ELECTROCARDIOGRAM REPORT: CPT | Mod: ,,, | Performed by: INTERNAL MEDICINE

## 2018-06-22 PROCEDURE — 25000003 PHARM REV CODE 250: Performed by: NURSE PRACTITIONER

## 2018-06-22 PROCEDURE — 63600175 PHARM REV CODE 636 W HCPCS: Performed by: NURSE PRACTITIONER

## 2018-06-22 PROCEDURE — 85730 THROMBOPLASTIN TIME PARTIAL: CPT

## 2018-06-22 PROCEDURE — 80048 BASIC METABOLIC PNL TOTAL CA: CPT

## 2018-06-22 PROCEDURE — 99152 MOD SED SAME PHYS/QHP 5/>YRS: CPT | Mod: ICN,,, | Performed by: INTERNAL MEDICINE

## 2018-06-22 PROCEDURE — 25500020 PHARM REV CODE 255

## 2018-06-22 PROCEDURE — 99215 OFFICE O/P EST HI 40 MIN: CPT | Mod: 57,,, | Performed by: NURSE PRACTITIONER

## 2018-06-22 PROCEDURE — 85610 PROTHROMBIN TIME: CPT

## 2018-06-22 PROCEDURE — 75716 ARTERY X-RAYS ARMS/LEGS: CPT | Mod: 26,ICN,, | Performed by: INTERNAL MEDICINE

## 2018-06-22 PROCEDURE — 25000003 PHARM REV CODE 250

## 2018-06-22 PROCEDURE — 36415 COLL VENOUS BLD VENIPUNCTURE: CPT

## 2018-06-22 PROCEDURE — G0378 HOSPITAL OBSERVATION PER HR: HCPCS

## 2018-06-22 PROCEDURE — 85025 COMPLETE CBC W/AUTO DIFF WBC: CPT

## 2018-06-22 PROCEDURE — B41D1ZZ FLUOROSCOPY OF AORTA AND BILATERAL LOWER EXTREMITY ARTERIES USING LOW OSMOLAR CONTRAST: ICD-10-PCS | Performed by: INTERNAL MEDICINE

## 2018-06-22 PROCEDURE — 75625 CONTRAST EXAM ABDOMINL AORTA: CPT

## 2018-06-22 PROCEDURE — S4991 NICOTINE PATCH NONLEGEND: HCPCS | Performed by: INTERNAL MEDICINE

## 2018-06-22 RX ORDER — DIPHENHYDRAMINE HCL 50 MG
50 CAPSULE ORAL ONCE
Status: DISCONTINUED | OUTPATIENT
Start: 2018-06-22 | End: 2018-06-23 | Stop reason: HOSPADM

## 2018-06-22 RX ORDER — HYDRALAZINE HYDROCHLORIDE 20 MG/ML
10 INJECTION INTRAMUSCULAR; INTRAVENOUS EVERY 6 HOURS PRN
Status: DISCONTINUED | OUTPATIENT
Start: 2018-06-22 | End: 2018-06-23 | Stop reason: HOSPADM

## 2018-06-22 RX ORDER — AMLODIPINE BESYLATE 5 MG/1
10 TABLET ORAL DAILY
Status: DISCONTINUED | OUTPATIENT
Start: 2018-06-22 | End: 2018-06-23 | Stop reason: HOSPADM

## 2018-06-22 RX ADMIN — CILOSTAZOL 50 MG: 50 TABLET ORAL at 08:06

## 2018-06-22 RX ADMIN — ATORVASTATIN CALCIUM 10 MG: 10 TABLET, FILM COATED ORAL at 08:06

## 2018-06-22 RX ADMIN — NICOTINE 1 PATCH: 21 PATCH, EXTENDED RELEASE TRANSDERMAL at 08:06

## 2018-06-22 RX ADMIN — SODIUM CHLORIDE 3 ML/KG/HR: 0.9 INJECTION, SOLUTION INTRAVENOUS at 01:06

## 2018-06-22 RX ADMIN — DIPHENHYDRAMINE HYDROCHLORIDE 50 MG: 50 CAPSULE ORAL at 12:06

## 2018-06-22 RX ADMIN — CILOSTAZOL 50 MG: 50 TABLET ORAL at 09:06

## 2018-06-22 RX ADMIN — CLOPIDOGREL BISULFATE 75 MG: 75 TABLET ORAL at 08:06

## 2018-06-22 RX ADMIN — AMLODIPINE BESYLATE 10 MG: 5 TABLET ORAL at 09:06

## 2018-06-22 RX ADMIN — LISINOPRIL 40 MG: 20 TABLET ORAL at 09:06

## 2018-06-22 RX ADMIN — SODIUM CHLORIDE: 0.9 INJECTION, SOLUTION INTRAVENOUS at 12:06

## 2018-06-22 RX ADMIN — HYDRALAZINE HYDROCHLORIDE 10 MG: 20 INJECTION INTRAMUSCULAR; INTRAVENOUS at 09:06

## 2018-06-22 NOTE — PLAN OF CARE
Problem: Patient Care Overview  Goal: Plan of Care Review  Outcome: Ongoing (interventions implemented as appropriate)  Plan of care reviewed with patient prior to [facility]. Voiced understanding. NSR on monitor today with no red alarms noted. Patient in cathlab recovery at this time and will finish recovery here until 730 pm.  Patient will be monitored overnight.

## 2018-06-22 NOTE — PROGRESS NOTES
.Pharmacy New Medication Education    Patient accepted medication education.    Pharmacy educated patient on name and purpose of medications and possible side effects, using the teach-back method.     Current Inpatient Medication Orders   0.9% NaCl infusion   amLODIPine tablet 10 mg   atorvastatin tablet 10 mg   cilostazol tablet 50 mg   clopidogrel tablet 75 mg   diphenhydrAMINE capsule 50 mg   hydrALAZINE injection 10 mg   HYDROcodone-acetaminophen 7.5-325 mg per tablet 1 tablet   lisinopril tablet 40 mg   morphine injection 2 mg   nicotine 21 mg/24 hr 1 patch       Learners of pharmacy medication education included:  Patient    Patient +/- learner response:  Verbalized Understanding, Teachback

## 2018-06-22 NOTE — H&P
[facility]  Cardiology  History and Physical     Patient Name: Roma Dunbar  MRN: [id number]  Admission Date: 6/21/2018  Code Status: Full Code   Attending Provider: Aditya Neely MD   Primary Care Physician: Rolly Ferro MD  Principal Problem:Arterial occlusion    Patient information was obtained from patient, past medical records and ER records.     Subjective:     Chief Complaint:  Claudication      HPI:  Roma Dunbar is a 67 y.o. female who  has a past medical history of Coronary artery disease; GERD (gastroesophageal reflux disease); Herpes simplex without mention of complication; Hyperlipidemia; Hypertension; and PAD (peripheral artery disease). Patient last seen in clinic on 06/19/2018 with complaints of leg claudication that only occurs with walking. Pain usually occurs after 25-50 feet. She matias shave history of PAD with right BKA. No rest pain or ulcers. She denies chest pain and dyspnea. She continues to smoke and have been enrolled in smoking cessation classes. She is smoking 1 pck daily.  She is compliant with medications and is on pletal. Patient presented to the ED due to pain in the LLE and abnormal US results. Pt was seen in this ED last week for bilateral leg pain. She feels numbness and tingling in the b/l LE and has had increased leg weakness.   Outpatient arterial us:  No visualized arterial flow in the right lower extremity with an occluded bypass graft; there is a focus of complex fluid seen along the distal portion of the graft which may represent seroma or old blood products.    Left lower extremity demonstrates complete occlusion of the superficial femoral artery; there is flow visualized in the common femoral artery as well as in the popliteal artery and below the feet all with monophasic waveform suggesting additional elements of hemodynamically significant stenosis.  Admitted to cardiology for revascularization evaluation. NPO for AO with runoff today.      Past Medical  History:   Diagnosis Date    Coronary artery disease     GERD (gastroesophageal reflux disease)     Herpes simplex without mention of complication     Hyperlipidemia     Hypertension     PAD (peripheral artery disease)        Past Surgical History:   Procedure Laterality Date    BKA   mid 2012    Right side at shin level amputation. 2/2 PVD    CARDIAC SURGERY      CORONARY ARTERY BYPASS GRAFT      before [place]    HYSTERECTOMY         Review of patient's allergies indicates:  No Known Allergies    No current facility-administered medications on file prior to encounter.      Current Outpatient Prescriptions on File Prior to Encounter   Medication Sig    aspirin 325 MG tablet Take 325 mg by mouth once daily.    cilostazol (PLETAL) 50 MG Tab Take 1 tablet (50 mg total) by mouth 2 (two) times daily.    lisinopril-hydrochlorothiazide (PRINZIDE,ZESTORETIC) 20-12.5 mg per tablet Take 1 tablet by mouth 2 (two) times daily.    nicotine (NICODERM CQ) 21 mg/24 hr Place 1 patch onto the skin once daily.    nicotine polacrilex 2 MG Lozg Take 1 lozenge (2 mg total) by mouth as needed (Use in place of cigarettes).    simvastatin (ZOCOR) 20 MG tablet Take 1 tablet (20 mg total) by mouth every evening.     Family History     Problem Relation (Age of Onset)    Colon cancer Maternal Grandmother    Diabetes Mother    Hypertension Mother        Social History Main Topics    Smoking status: Current Some Day Smoker     Packs/day: 1.50     Years: 40.00     Types: Cigarettes    Smokeless tobacco: Never Used    Alcohol use Yes      Comment: beer on weekends    Drug use: No    Sexual activity: Not on file     Review of Systems   Constitution: Negative.   HENT: Negative.    Eyes: Negative.    Cardiovascular: Positive for claudication. Negative for dyspnea on exertion, irregular heartbeat, leg swelling, orthopnea, palpitations, paroxysmal nocturnal dyspnea and syncope.   Respiratory: Positive for cough (chronic ). Negative  for shortness of breath and sputum production.    Endocrine: Negative.    Hematologic/Lymphatic: Negative.  Negative for bleeding problem. Does not bruise/bleed easily.   Skin: Negative.    Musculoskeletal: Positive for arthritis and myalgias.   Gastrointestinal: Negative.    Genitourinary: Negative.    Neurological: Negative.    Psychiatric/Behavioral: Negative.      Objective:     Vital Signs (Most Recent):  Temp: 98.8 °F (37.1 °C) (06/22/18 1127)  Pulse: 79 (06/22/18 1127)  Resp: 20 (06/22/18 1127)  BP: (!) 150/64 (06/22/18 1220)  SpO2: 100 % (06/22/18 0726) Vital Signs (24h Range):  Temp:  [97.5 °F (36.4 °C)-98.9 °F (37.2 °C)] 98.8 °F (37.1 °C)  Pulse:  [54-79] 79  Resp:  [17-20] 20  SpO2:  [98 %-100 %] 100 %  BP: (144-197)/(50-86) 150/64     Weight: 55.8 kg (123 lb 0.3 oz)  Body mass index is 22.5 kg/m².    SpO2: 100 %  O2 Device (Oxygen Therapy): room air      Intake/Output Summary (Last 24 hours) at 06/22/18 1310  Last data filed at 06/22/18 1000   Gross per 24 hour   Intake                0 ml   Output             1100 ml   Net            -1100 ml       Lines/Drains/Airways     Peripheral Intravenous Line                 Peripheral IV - Single Lumen 06/21/18 1736 Left Forearm less than 1 day                Physical Exam   Constitutional: She is oriented to person, place, and time. She appears well-developed and well-nourished. No distress.   HENT:   Head: Atraumatic.   Eyes: Right eye exhibits no discharge. Left eye exhibits no discharge.   Neck: No JVD present.   Cardiovascular: Normal rate, regular rhythm and normal heart sounds.  Exam reveals no gallop and no friction rub.    No murmur heard.  Pulmonary/Chest: She has decreased breath sounds. She has no rhonchi. She has no rales.   Abdominal: Soft. Bowel sounds are normal.   Musculoskeletal: She exhibits no edema or tenderness.   Neurological: She is alert and oriented to person, place, and time.   Skin: Skin is warm and dry. She is not diaphoretic.    Psychiatric: She has a normal mood and affect. Her behavior is normal.       Significant Labs:   BMP:   Recent Labs  Lab 06/21/18  1735 06/22/18  0843   GLU 76 77    142   K 3.5 4.3    107   CO2 23 27   BUN 13 12   CREATININE 0.8 0.8   CALCIUM 10.6* 10.1   , CMP   Recent Labs  Lab 06/21/18  1735 06/22/18  0843    142   K 3.5 4.3    107   CO2 23 27   GLU 76 77   BUN 13 12   CREATININE 0.8 0.8   CALCIUM 10.6* 10.1   ANIONGAP 12 8   ESTGFRAFRICA >60 >60   EGFRNONAA >60 >60   , CBC   Recent Labs  Lab 06/21/18  1735 06/22/18  0843   WBC 6.52 4.44   HGB 13.9 14.1   HCT 41.9 43.2    151   , INR   Recent Labs  Lab 06/22/18  0843   INR 1.1   , Lipid Panel No results for input(s): CHOL, HDL, LDLCALC, TRIG, CHOLHDL in the last 48 hours., Troponin No results for input(s): TROPONINI in the last 48 hours. and All pertinent lab results from the last 24 hours have been reviewed.    Significant Imaging: Echocardiogram:   2D echo with color flow doppler:   Results for orders placed or performed during the hospital encounter of 09/16/16   2D Echo w/ Color Flow Doppler   Result Value Ref Range    EF 60 55 - 65    Diastolic Dysfunction Yes (A)     Aortic Valve Regurgitation MILD     Aortic Valve Stenosis MILD (A)     Est. PA Systolic Pressure 27.21     Pericardial Effusion NONE     Tricuspid Valve Regurgitation MILD      Assessment and Plan:     * Arterial occlusion    Arterial US  Right lower extremity:  There is no visualized flow throughout the arterial structures of the right lower extremity including the femoral popliteal bypass graft.  There is a 2.7 x 1.2 x 0.8 cm focus of fluid seen along the distal portion of the graft.    Left lower extremity:  The common femoral artery has a monophasic waveform with 114 cm/second velocity  There is no flow visualized in the superficial femoral artery with reconstitution of flow seen at the popliteal artery with a velocity is 109 centimeters/second with a  monophasic waveform.  Posterior tibial artery has a velocity of 79 centimeters/second with a monophasic waveform.  Anterior tibial artery has a 56 cm per 2nd velocity with monophasic waveform.  The dorsal pedal artery as a 14.9 centimeter/second velocity with a monophasic waveform.    NPO for AO with runoff  No wounds  Continue Pletal, Plavix, ACEI, statin  Further recs to follow   Smoking cessation         Hyperlipidemia    Continue statin        Essential hypertension    -190  Hydralazine IVP PRN SBP >160  Continue Lisinopril 40, Norvasc added, Bradycardic- no BB             VTE Risk Mitigation         Ordered     IP VTE LOW RISK PATIENT  Once      06/21/18 1718     Place KYLEE hose  Until discontinued      06/21/18 1718          Kirill Miranda NP  Cardiology   [facility]

## 2018-06-22 NOTE — NURSING
Kirill Miranda NP notified of rash to left side. Patient states rash started as a bug bite and has gotten bigger.

## 2018-06-22 NOTE — NURSING
Plan of care reviewed with patient, understanding verbalized. Pt remains NSR on tele. Bed alarm on, call light in reach, fall precautions in place. No distress noted. Will continue to monitor

## 2018-06-22 NOTE — PLAN OF CARE
15:00 2 mL of air removed from both right radial vasc bands.  No hematoma or bleeding noted.  +2 silvana radial pulses palpated. Skin normal in color, warm to touch, < 3 sec cap refill.   Will continue to monitor pt.    15:30 An additional 2 mL of air removed from radial vasc band.  No hematoma or bleeding noted.  +2 silvana radial pulses palpated. Skin normal in color, warm to touch, < 3 sec cap refill.   Will continue to monitor pt.

## 2018-06-22 NOTE — SUBJECTIVE & OBJECTIVE
Past Medical History:   Diagnosis Date    Coronary artery disease     GERD (gastroesophageal reflux disease)     Herpes simplex without mention of complication     Hyperlipidemia     Hypertension     PAD (peripheral artery disease)        Past Surgical History:   Procedure Laterality Date    BKA   mid 2012    Right side at shin level amputation. 2/2 PVD    CARDIAC SURGERY      CORONARY ARTERY BYPASS GRAFT      before [place]    HYSTERECTOMY         Review of patient's allergies indicates:  No Known Allergies    No current facility-administered medications on file prior to encounter.      Current Outpatient Prescriptions on File Prior to Encounter   Medication Sig    aspirin 325 MG tablet Take 325 mg by mouth once daily.    cilostazol (PLETAL) 50 MG Tab Take 1 tablet (50 mg total) by mouth 2 (two) times daily.    lisinopril-hydrochlorothiazide (PRINZIDE,ZESTORETIC) 20-12.5 mg per tablet Take 1 tablet by mouth 2 (two) times daily.    nicotine (NICODERM CQ) 21 mg/24 hr Place 1 patch onto the skin once daily.    nicotine polacrilex 2 MG Lozg Take 1 lozenge (2 mg total) by mouth as needed (Use in place of cigarettes).    simvastatin (ZOCOR) 20 MG tablet Take 1 tablet (20 mg total) by mouth every evening.     Family History     Problem Relation (Age of Onset)    Colon cancer Maternal Grandmother    Diabetes Mother    Hypertension Mother        Social History Main Topics    Smoking status: Current Some Day Smoker     Packs/day: 1.50     Years: 40.00     Types: Cigarettes    Smokeless tobacco: Never Used    Alcohol use Yes      Comment: beer on weekends    Drug use: No    Sexual activity: Not on file     Review of Systems   Constitution: Negative.   HENT: Negative.    Eyes: Negative.    Cardiovascular: Positive for claudication. Negative for dyspnea on exertion, irregular heartbeat, leg swelling, orthopnea, palpitations, paroxysmal nocturnal dyspnea and syncope.   Respiratory: Positive for cough  (chronic ). Negative for shortness of breath and sputum production.    Endocrine: Negative.    Hematologic/Lymphatic: Negative.  Negative for bleeding problem. Does not bruise/bleed easily.   Skin: Negative.    Musculoskeletal: Positive for arthritis and myalgias.   Gastrointestinal: Negative.    Genitourinary: Negative.    Neurological: Negative.    Psychiatric/Behavioral: Negative.      Objective:     Vital Signs (Most Recent):  Temp: 98.8 °F (37.1 °C) (06/22/18 1127)  Pulse: 79 (06/22/18 1127)  Resp: 20 (06/22/18 1127)  BP: (!) 150/64 (06/22/18 1220)  SpO2: 100 % (06/22/18 0726) Vital Signs (24h Range):  Temp:  [97.5 °F (36.4 °C)-98.9 °F (37.2 °C)] 98.8 °F (37.1 °C)  Pulse:  [54-79] 79  Resp:  [17-20] 20  SpO2:  [98 %-100 %] 100 %  BP: (144-197)/(50-86) 150/64     Weight: 55.8 kg (123 lb 0.3 oz)  Body mass index is 22.5 kg/m².    SpO2: 100 %  O2 Device (Oxygen Therapy): room air      Intake/Output Summary (Last 24 hours) at 06/22/18 1310  Last data filed at 06/22/18 1000   Gross per 24 hour   Intake                0 ml   Output             1100 ml   Net            -1100 ml       Lines/Drains/Airways     Peripheral Intravenous Line                 Peripheral IV - Single Lumen 06/21/18 1736 Left Forearm less than 1 day                Physical Exam   Constitutional: She is oriented to person, place, and time. She appears well-developed and well-nourished. No distress.   HENT:   Head: Atraumatic.   Eyes: Right eye exhibits no discharge. Left eye exhibits no discharge.   Neck: No JVD present.   Cardiovascular: Normal rate, regular rhythm and normal heart sounds.  Exam reveals no gallop and no friction rub.    No murmur heard.  Pulmonary/Chest: She has decreased breath sounds. She has no rhonchi. She has no rales.   Abdominal: Soft. Bowel sounds are normal.   Musculoskeletal: She exhibits no edema or tenderness.   Neurological: She is alert and oriented to person, place, and time.   Skin: Skin is warm and dry. She is  not diaphoretic.   Psychiatric: She has a normal mood and affect. Her behavior is normal.       Significant Labs:   BMP:   Recent Labs  Lab 06/21/18  1735 06/22/18  0843   GLU 76 77    142   K 3.5 4.3    107   CO2 23 27   BUN 13 12   CREATININE 0.8 0.8   CALCIUM 10.6* 10.1   , CMP   Recent Labs  Lab 06/21/18  1735 06/22/18  0843    142   K 3.5 4.3    107   CO2 23 27   GLU 76 77   BUN 13 12   CREATININE 0.8 0.8   CALCIUM 10.6* 10.1   ANIONGAP 12 8   ESTGFRAFRICA >60 >60   EGFRNONAA >60 >60   , CBC   Recent Labs  Lab 06/21/18  1735 06/22/18  0843   WBC 6.52 4.44   HGB 13.9 14.1   HCT 41.9 43.2    151   , INR   Recent Labs  Lab 06/22/18  0843   INR 1.1   , Lipid Panel No results for input(s): CHOL, HDL, LDLCALC, TRIG, CHOLHDL in the last 48 hours., Troponin No results for input(s): TROPONINI in the last 48 hours. and All pertinent lab results from the last 24 hours have been reviewed.    Significant Imaging: Echocardiogram:   2D echo with color flow doppler:   Results for orders placed or performed during the hospital encounter of 09/16/16   2D Echo w/ Color Flow Doppler   Result Value Ref Range    EF 60 55 - 65    Diastolic Dysfunction Yes (A)     Aortic Valve Regurgitation MILD     Aortic Valve Stenosis MILD (A)     Est. PA Systolic Pressure 27.21     Pericardial Effusion NONE     Tricuspid Valve Regurgitation MILD

## 2018-06-22 NOTE — ASSESSMENT & PLAN NOTE
Arterial US  Right lower extremity:  There is no visualized flow throughout the arterial structures of the right lower extremity including the femoral popliteal bypass graft.  There is a 2.7 x 1.2 x 0.8 cm focus of fluid seen along the distal portion of the graft.    Left lower extremity:  The common femoral artery has a monophasic waveform with 114 cm/second velocity  There is no flow visualized in the superficial femoral artery with reconstitution of flow seen at the popliteal artery with a velocity is 109 centimeters/second with a monophasic waveform.  Posterior tibial artery has a velocity of 79 centimeters/second with a monophasic waveform.  Anterior tibial artery has a 56 cm per 2nd velocity with monophasic waveform.  The dorsal pedal artery as a 14.9 centimeter/second velocity with a monophasic waveform.    NPO for AO with runoff  No wounds  Continue Pletal, Plavix, ACEI, statin  Further recs to follow   Smoking cessation

## 2018-06-22 NOTE — PLAN OF CARE
13:30  Patient transferred to recovery cath lab slot 5 via stretcher with side rails up x2 .  Pt drowsy but able to follow commands. Pt is stable when connecting to cardiac monitors.  VSS. Right radial vasc band x2 in place with 12ml of air in upper band and 10ml of air in lower vasc band. c.d.i. no drainage or noted. No redness, bruising, or hematoma noted around site. +2 silvana radial pulses palpated.  dopplered silvana pedal pulses.  Skin normal in color and warm to touch, <3 sec cap refill.  Fall risk precautions given and patient acknowledges.  AIDET completed to pt.  Will continue to monitor patient.  Updated pt's daughter by phone.

## 2018-06-22 NOTE — NURSING
Patient transferred to cath lab via cath lab nurse. No family at bedside. Phone and purse placed in patients drawer per request.

## 2018-06-22 NOTE — PLAN OF CARE
Accompanied by daughter Kanchan.    Patient lives alone, has prosthetic leg and uses a wheelchair a night before she goes to bed, otherwise, does not use any other medical equipment.    Patient has requested that her surgery appointment on 6/25/18 be moved to an AM slot, [place] called them to request this, but Dr. Ibarra does not have AM clinic hours as per his staff.  Follow-up With  Details  Why  Contact Info   prefers early morning appts         Marixa Ibarra M.D.  Go on 6/25/2018  @ 2:15pm  [address] [facility] [address]  [phone]   Gema Ramirez MD  Go on 7/24/2018  @ 4pm  [address] [facility] [address]  [facility] [place] [address]  [phone]   Saji King MD  On 7/10/2018  @ 8am  [address] [facility] [address]  [facility] [place] [address]  [phone]          Patient expecting procedure today, [place] will request Fernando f/u.       06/22/18 1029   Discharge Assessment   Assessment Type Discharge Planning Assessment   Confirmed/corrected address and phone number on facesheet? Yes   Assessment information obtained from? Patient   Prior to hospitilization cognitive status: Alert/Oriented   Prior to hospitalization functional status: Assistive Equipment   Current cognitive status: Alert/Oriented   Current Functional Status: Assistive Equipment   Lives With alone   Able to Return to Prior Arrangements yes   Is patient able to care for self after discharge? Yes   Who are your caregiver(s) and their phone number(s)? Kanchan Dunbar Daughter     [phone]    Patient's perception of discharge disposition home or selfcare   Readmission Within The Last 30 Days no previous admission in last 30 days   Patient currently being followed by outpatient case management? No   Patient currently receives any other outside agency services? No   Equipment Currently Used at Home wheelchair;prosthesis   Do you have any problems affording any of your prescribed medications? No   Is the patient taking medications as  prescribed? yes   Does the patient have transportation home? Yes   Transportation Available family or friend will provide   Dialysis Name and Scheduled days n/a   Discharge Plan A Home with family   Patient/Family In Agreement With Plan yes   Does the patient have transportation to healthcare appointments? Yes

## 2018-06-22 NOTE — NURSING
Patient arrived from cathlab. Radial sites x2 with no bleeding or hematoma noted. 1 vasc band with 8cc air and other with 6cc air left to be removed.

## 2018-06-22 NOTE — PLAN OF CARE
15:45 Report called to RONALD Au. Patient transferred to floor room 485 on tele monitor. AAOx4. VSS. No c/o pain.   Patient attached to tele monitor upon arrival in room.  Right radial site rwith vasc band x2 in place with 8ml and 6ml of air in bands reviewed with RONALD Au at bedside. CDI, no hematoma or bleeding noted. All questions answered. Updated pt's daughter on phone by voicemail.

## 2018-06-22 NOTE — PROCEDURES
Post Procedure Note: Peripheral angiogram    The pt was brought to the cath lab and under sterile technique, Right radial access was obtained without difficulty. Images were obtained in multiple views and LSFA  noted with single vessel r/o via peroneal into a weak DP. On the right, occluded CFA with collaterals to knee (BKA). Please see full report for details. The pt tolerated the procedure well without complications. Radial band device used with successful hemostasis.     Vitals:    06/22/18 1007 06/22/18 1032 06/22/18 1127 06/22/18 1220   BP: (!) 160/62 (!) 164/50 (!) 174/75 (!) 150/64   Patient Position:       Pulse:   79    Resp:   20    Temp:   98.8 °F (37.1 °C)    TempSrc:       SpO2:       Weight:       Height:             Gen: NAD  Ext: 2+ radial pulse, no evidence of hematoma    Plan:  -Post cath care per protocol  -ASA/STATIN  -Plan for possible staged intervention of LLE

## 2018-06-22 NOTE — HPI
Roma Dunbar is a 67 y.o. female who  has a past medical history of Coronary artery disease; GERD (gastroesophageal reflux disease); Herpes simplex without mention of complication; Hyperlipidemia; Hypertension; and PAD (peripheral artery disease). Patient last seen in clinic on 06/19/2018 with complaints of leg claudication that only occurs with walking. Pain usually occurs after 25-50 feet. She matias shave history of PAD with right BKA. No rest pain or ulcers. She denies chest pain and dyspnea. She continues to smoke and have been enrolled in smoking cessation classes. She is smoking 1 pck daily.  She is compliant with medications and is on pletal. Patient presented to the ED due to pain in the LLE and abnormal US results. Pt was seen in this ED last week for bilateral leg pain. She feels numbness and tingling in the b/l LE and has had increased leg weakness.   Outpatient arterial us:  No visualized arterial flow in the right lower extremity with an occluded bypass graft; there is a focus of complex fluid seen along the distal portion of the graft which may represent seroma or old blood products.    Left lower extremity demonstrates complete occlusion of the superficial femoral artery; there is flow visualized in the common femoral artery as well as in the popliteal artery and below the feet all with monophasic waveform suggesting additional elements of hemodynamically significant stenosis.  Admitted to cardiology for revascularization evaluation. NPO for AO with runoff today.

## 2018-06-23 VITALS
RESPIRATION RATE: 15 BRPM | HEART RATE: 68 BPM | SYSTOLIC BLOOD PRESSURE: 155 MMHG | OXYGEN SATURATION: 97 % | DIASTOLIC BLOOD PRESSURE: 70 MMHG | WEIGHT: 123 LBS | TEMPERATURE: 97 F | BODY MASS INDEX: 22.63 KG/M2 | HEIGHT: 62 IN

## 2018-06-23 PROCEDURE — 25000003 PHARM REV CODE 250: Performed by: NURSE PRACTITIONER

## 2018-06-23 PROCEDURE — S4991 NICOTINE PATCH NONLEGEND: HCPCS | Performed by: INTERNAL MEDICINE

## 2018-06-23 PROCEDURE — G0378 HOSPITAL OBSERVATION PER HR: HCPCS

## 2018-06-23 PROCEDURE — 25000003 PHARM REV CODE 250: Performed by: INTERNAL MEDICINE

## 2018-06-23 PROCEDURE — 99217 PR OBSERVATION CARE DISCHARGE: CPT | Mod: ,,, | Performed by: INTERNAL MEDICINE

## 2018-06-23 RX ORDER — AMLODIPINE BESYLATE 10 MG/1
10 TABLET ORAL DAILY
Qty: 30 TABLET | Refills: 11 | Status: SHIPPED | OUTPATIENT
Start: 2018-06-23 | End: 2019-04-15

## 2018-06-23 RX ORDER — CLOPIDOGREL BISULFATE 75 MG/1
75 TABLET ORAL DAILY
Qty: 30 TABLET | Refills: 11 | Status: SHIPPED | OUTPATIENT
Start: 2018-06-23 | End: 2019-09-20 | Stop reason: SDUPTHER

## 2018-06-23 RX ORDER — ATORVASTATIN CALCIUM 10 MG/1
10 TABLET, FILM COATED ORAL DAILY
Qty: 90 TABLET | Refills: 3 | Status: SHIPPED | OUTPATIENT
Start: 2018-06-23 | End: 2019-08-28 | Stop reason: SDUPTHER

## 2018-06-23 RX ADMIN — SODIUM CHLORIDE: 0.9 INJECTION, SOLUTION INTRAVENOUS at 01:06

## 2018-06-23 RX ADMIN — CLOPIDOGREL BISULFATE 75 MG: 75 TABLET ORAL at 09:06

## 2018-06-23 RX ADMIN — ATORVASTATIN CALCIUM 10 MG: 10 TABLET, FILM COATED ORAL at 09:06

## 2018-06-23 RX ADMIN — CILOSTAZOL 50 MG: 50 TABLET ORAL at 09:06

## 2018-06-23 RX ADMIN — AMLODIPINE BESYLATE 10 MG: 5 TABLET ORAL at 09:06

## 2018-06-23 RX ADMIN — NICOTINE 1 PATCH: 21 PATCH, EXTENDED RELEASE TRANSDERMAL at 09:06

## 2018-06-23 NOTE — PROGRESS NOTES
Progress Note            [facility]    Subjective: Patient stable and doing well with no acute concerns. Leg pain is better.        Review of patient's allergies indicates:  No Known Allergies       amLODIPine  10 mg Oral Daily    atorvastatin  10 mg Oral Daily    cilostazol  50 mg Oral BID    clopidogrel  75 mg Oral Daily    diphenhydrAMINE  50 mg Oral Once    lisinopril  40 mg Oral QHS    nicotine  1 patch Transdermal Daily        sodium chloride 0.9% 100 mL/hr at 06/23/18 0150       hydrALAZINE, HYDROcodone-acetaminophen, morphine    Vitals:    06/23/18 0400 06/23/18 0516 06/23/18 0733 06/23/18 0800   BP:  (!) 172/78 (!) 155/70    BP Location:  Left arm Left arm    Patient Position:  Lying Lying    Pulse: 68 71 72 68   Resp:  18 15    Temp:  98.3 °F (36.8 °C) 97.4 °F (36.3 °C)    TempSrc:  Oral Oral    SpO2:       Weight:       Height:           Physical Examination:  General Appearance: No acute distress  Mental: Alert to person, time and place  HEENT: Perrl  Chest: No pain with palpitations, no chest deformities  Heart: RRR, no murmurs, no gallops or rubs  Lung: CTAB  ABDOMEN: Soft, nontender, nondistended with good bowel sounds heard. No mass or hepatosplenomegaly  EXTREMITIES: Without cyanosis, clubbing or edema. Decrease pulse      Lab Results   Component Value Date     06/22/2018    K 4.3 06/22/2018     06/22/2018    CO2 27 06/22/2018    BUN 12 06/22/2018    CREATININE 0.8 06/22/2018    GLU 77 06/22/2018    HGBA1C 5.0 08/19/2017    MG 2.1 09/27/2011    AST 17 03/07/2018    ALT 10 03/07/2018    ALBUMIN 4.1 03/07/2018    PROT 8.2 03/07/2018    BILITOT 0.6 03/07/2018    WBC 4.44 06/22/2018    HGB 14.1 06/22/2018    HCT 43.2 06/22/2018    MCV 91 06/22/2018     06/22/2018    INR 1.1 06/22/2018    TSH 2.216 08/19/2017    CHOL 178 03/07/2018    HDL 73 03/07/2018    LDLCALC 90.6 03/07/2018    TRIG 72 03/07/2018       No results for input(s): CPK, LUISA,  TROPONINI, MB in the last 24 hours.    No results for input(s): CPK, CPKMB, TROPONINI, MB in the last 168 hours.      Lab Results   Component Value Date    TSH 2.216 08/19/2017       Lab Results   Component Value Date    HGBA1C 5.0 08/19/2017           Images and labs reviewed    LSFA  noted with single vessel r/o via peroneal into a weak DP. On the right, occluded CFA with collaterals to knee (BKA).       Assessment/Plan  1. Severe PAD with Left  of SFA and occluded CFA on right to BKA. Will need intervention. After discussion with Dr. King this can be done as an out patient. Continue asa, lipitor, plavix and pletal.  2. HTN uncontrolled. Will restart lisinopril/HCTZ on discharge    The importance of plavix was explained to patient in details. Plavix should not be discontinued unless instructed by Cardiologist and plavix should be taken everyday, if not patient risk the chance of acute stent thrombosis and death. Patient questions were answered and patient verbalized understanding.      Gema Ramirez MD  [facility]

## 2018-06-23 NOTE — PLAN OF CARE
Problem: Patient Care Overview  Goal: Plan of Care Review  Outcome: Ongoing (interventions implemented as appropriate)  Patient had  an unevenful night. Nurse went over plan of care with patient, questions encouraged. Dressing to right wrist clean and intact, bilateral radial pulses dopplered. Patient denies pain. On continuous heart monitoring, NSR, no true red alarms . Fall precautions maintained.  Bed locked and low, side rails X3, call bell within reach. Nurse asked patient to call before ambulating, patient verbalized understanding. Will continue monitoring.

## 2018-06-23 NOTE — PLAN OF CARE
Discharge orders noted, no HH or HME ordered.    Future Appointments  Date Time Provider Department Center   6/25/2018 2:15 PM Marixa Ibarra MD [facility]   7/10/2018 8:00 AM Saji King MD [facility]   7/24/2018 4:00 PM Gema Ramirez MD [facility]       Pt's nurse will go over medications/signs and symptoms prior to discharge       06/23/18 1022   Final Note   Assessment Type Final Discharge Note   Discharge Disposition Home   What phone number can be called within the next 1-3 days to see how you are doing after discharge? [phone]   Hospital Follow Up  Appt(s) scheduled? Yes   Right Care Referral Info   Post Acute Recommendation No Care     Cristal Wadsworth, RN Transitional Navigator  [phone]

## 2018-06-23 NOTE — DISCHARGE SUMMARY
DC Summary    Admission Date: 6/21/2018  Discharge Date: 6/23/2018  Attending Physician: Gema Ramirez    Condition on Discharge: Stable    Final Diagnosis:   Severe PAD with leg claudication  Procedures:   Diagnostic Lower extremity angiogram  History of Present Illness : Refer to H&P admission note  Laboratory/Data :   Lab Results   Component Value Date     06/22/2018    K 4.3 06/22/2018     06/22/2018    CO2 27 06/22/2018    BUN 12 06/22/2018    CREATININE 0.8 06/22/2018    GLU 77 06/22/2018    HGBA1C 5.0 08/19/2017    MG 2.1 09/27/2011    AST 17 03/07/2018    ALT 10 03/07/2018    ALBUMIN 4.1 03/07/2018    PROT 8.2 03/07/2018    BILITOT 0.6 03/07/2018    WBC 4.44 06/22/2018    HGB 14.1 06/22/2018    HCT 43.2 06/22/2018    MCV 91 06/22/2018     06/22/2018    INR 1.1 06/22/2018    TSH 2.216 08/19/2017    CHOL 178 03/07/2018    HDL 73 03/07/2018    LDLCALC 90.6 03/07/2018    TRIG 72 03/07/2018       No results for input(s): CPK, CPKMB, TROPONINI, MB in the last 24 hours.    No results for input(s): CPK, CPKMB, TROPONINI, MB in the last 168 hours.      Lab Results   Component Value Date    TSH 2.216 08/19/2017       Lab Results   Component Value Date    HGBA1C 5.0 08/19/2017         Hospital Course:  LSFA  noted with single vessel r/o via peroneal into a weak DP. On the right, occluded CFA with collaterals to knee (BKA).       Assessment/Plan  1. Severe PAD with Left  of SFA and occluded CFA on right to BKA. Will need intervention. After discussion with Dr. King this can be done as an out patient. Continue asa, lipitor, plavix and pletal.  2. HTN uncontrolled. Will restart lisinopril/HCTZ on discharge  Stable for discharge. No signs of an acute leg.    Discharge Medications:    Roma Dunbar   Home Medication Instructions MANDY:[id number]    Printed on:06/23/18 0851   Medication Information                      amLODIPine (NORVASC) 10 MG tablet  Take 1 tablet (10 mg total) by mouth once  daily.             aspirin 325 MG tablet  Take 325 mg by mouth once daily.             atorvastatin (LIPITOR) 10 MG tablet  Take 1 tablet (10 mg total) by mouth once daily.             cilostazol (PLETAL) 50 MG Tab  Take 1 tablet (50 mg total) by mouth 2 (two) times daily.             clopidogrel (PLAVIX) 75 mg tablet  Take 1 tablet (75 mg total) by mouth once daily.             lisinopril-hydrochlorothiazide (PRINZIDE,ZESTORETIC) 20-12.5 mg per tablet  Take 1 tablet by mouth 2 (two) times daily.             nicotine (NICODERM CQ) 21 mg/24 hr  Place 1 patch onto the skin once daily.             nicotine polacrilex 2 MG Lozg  Take 1 lozenge (2 mg total) by mouth as needed (Use in place of cigarettes).               Discharge Instructions: Heart healthy diet, compliance with medications  Follow up Appointments: f/u with Dr. King July 10th      Disposition: discharge HOME    The importance of plavix was explained to patient in details. Plavix should not be discontinued unless instructed by Cardiologist and plavix should be taken everyday, if not patient risk the chance of acute stent thrombosis and death. Patient questions were answered and patient verbalized understanding.    Time spent on discharge 45 minutes.

## 2018-06-23 NOTE — PLAN OF CARE
Pt discharge to home. Pt is stable. Discharge teaching discussed with pt and family. Verbalized clear understanding. IV removed and exhibit no signs of active bleeding. No complaints of discomfort or pain. No respiratory distress noted. Telemetry box removed. Waiting for transport to [facility].

## 2018-06-25 ENCOUNTER — PATIENT OUTREACH (OUTPATIENT)
Dept: ADMINISTRATIVE | Facility: CLINIC | Age: 68
End: 2018-06-25

## 2018-06-25 NOTE — PATIENT INSTRUCTIONS
Peripheral Artery Disease (PAD)     Peripheral artery disease (PAD) occurs when the arteries that carry blood to the limbs are narrowed or blocked. This is usually due to a buildup of a fatty substance called plaque in the walls of the arteries.  PAD most often affects the arteries in the legs. When these arteries are narrowed or blocked, blood flow to the legs is reduced. This can cause leg and foot pain and other symptoms. If severe enough, reduced blood flow to the legs can lead to tissue death (gangrene) and the loss of a toe, foot, or leg. Having PAD also makes it more likely that arteries in other body areas are blocked. For instance, arteries that carry blood to the heart or brain may be affected. This raises the chances of heart attack and stroke.  Risk factors  Certain factors can make PAD more likely. They include:  · Smoking  · Diabetes  · High blood pressure  · Unhealthy cholesterol levels  · Obesity  · Inactive lifestyle  · Older age  · Family history of PAD  Symptoms  Many people with PAD have no symptoms. If symptoms do occur, they can include:  · Pain in the muscles of the calves, thighs or hips that gets worse with activity and better with rest (intermittent claudication)  · Achy, tired, or heavy feeling in the legs  · Weakness, numbness, tingling, or loss of feeling in the legs  · Changes in skin color of the legs  · Sores on the legs and feet  · Cold leg, feet, or toes  · Pain the feet or toes even when lying down (rest pain)  Home care  PAD is a chronic (lifelong) condition. Treatment is focused on managing your condition and lowering your health risks. This may include doing the following:  · If you smoke, quit. This helps prevent further damage to your arteries and lowers your health risks. Ask your provider about medicines or products that can help you quit smoking. Also consider joining a stop-smoking program or support group.  · Be more active. This helps you lose weight and manage  problems such as high blood pressure and unhealthy cholesterol levels. Start a walking program if advised to by your provider. Your provider may also help you form a safe exercise program that is right for your needs.  · Make healthy eating changes. This includes eating less fat, salt, and sugar.  · Take medicines for high blood pressure, unhealthy cholesterol levels, and diabetes as directed.  · Have your blood pressure and cholesterol levels checked as often as directed.  · If you have diabetes, try to keep your blood sugar well controlled. Test your blood sugar as directed.  · If you are overweight, talk to your provider about forming a weight-loss plan.  · Watch for cuts, scrapes, or open sores on your feet. Poor blood flow to the feet may slow healing and increase the risk of infection from these problems.   Follow-up care  Follow up with your healthcare provider, or as advised. If imaging tests such as ultrasound were done, they will be reviewed by a doctor. You will be told the results and any new findings that may affect your care.  When to seek medical advice   Call your healthcare provider right away if any of these occur:  · Sudden severe pain in the legs or feet  · Sudden cold, pale or blue color in the legs or feet  · Weakness or numbness in the legs or feet that worsens  · Any sore or wound in the legs or feet that wont heal  · Weak pulse in your legs or feet  Know the Signs of Heart Attack and Stroke  People with PAD are at high risk for heart attack and stroke. Knowing the signs of these problems can help you protect your health and get help when you need it. Call 911 right away if you have any of the following:  Signs of a Heart Attack  · Chest discomfort, such as pain, aching, tightness, or pressure that lasts more than a few minutes, or that comes and goes  · Pain or discomfort in the arms, back, shoulders, neck, or jaw  · Shortness of breath  · Sweating (often a cold, clammy  sweat)  · Nausea  · Lightheadedness  Signs of a Stroke  · Sudden numbness or weakness of the face, arms, or legs, especially on one side  · Sudden confusion or trouble speaking or understanding  · Sudden trouble seeing in one or both eyes  · Sudden trouble walking, dizziness, or loss of balance  · Sudden, severe headache with no known cause   Date Last Reviewed: 9/21/2015  © 2789-4528 [employer]. [address] [place] [address], [place] [id number]. All rights reserved. This information is not intended as a substitute for professional medical care. Always follow your healthcare professional's instructions.

## 2018-07-10 ENCOUNTER — OFFICE VISIT (OUTPATIENT)
Dept: CARDIOLOGY | Facility: CLINIC | Age: 68
End: 2018-07-10
Payer: MEDICARE

## 2018-07-10 VITALS
DIASTOLIC BLOOD PRESSURE: 81 MMHG | HEART RATE: 84 BPM | BODY MASS INDEX: 23.92 KG/M2 | SYSTOLIC BLOOD PRESSURE: 167 MMHG | HEIGHT: 62 IN | WEIGHT: 130 LBS

## 2018-07-10 DIAGNOSIS — I25.10 CORONARY ARTERY DISEASE INVOLVING NATIVE CORONARY ARTERY OF NATIVE HEART WITHOUT ANGINA PECTORIS: ICD-10-CM

## 2018-07-10 DIAGNOSIS — I10 ESSENTIAL HYPERTENSION: ICD-10-CM

## 2018-07-10 DIAGNOSIS — J43.2 CENTRILOBULAR EMPHYSEMA: ICD-10-CM

## 2018-07-10 DIAGNOSIS — I70.90 ARTERIAL OCCLUSION: ICD-10-CM

## 2018-07-10 DIAGNOSIS — F17.200 TOBACCO DEPENDENCE: ICD-10-CM

## 2018-07-10 DIAGNOSIS — E78.2 MIXED HYPERLIPIDEMIA: ICD-10-CM

## 2018-07-10 DIAGNOSIS — Z71.6 TOBACCO ABUSE COUNSELING: ICD-10-CM

## 2018-07-10 DIAGNOSIS — Z89.511 HX OF RIGHT BKA: ICD-10-CM

## 2018-07-10 DIAGNOSIS — M54.16 RIGHT LUMBAR RADICULOPATHY: ICD-10-CM

## 2018-07-10 DIAGNOSIS — I73.9 PAD (PERIPHERAL ARTERY DISEASE): Primary | ICD-10-CM

## 2018-07-10 DIAGNOSIS — I70.213 ATHEROSCLEROSIS OF NATIVE ARTERY OF BOTH LOWER EXTREMITIES WITH INTERMITTENT CLAUDICATION: ICD-10-CM

## 2018-07-10 PROCEDURE — 99999 PR PBB SHADOW E&M-EST. PATIENT-LVL III: CPT | Mod: PBBFAC,,, | Performed by: INTERNAL MEDICINE

## 2018-07-10 PROCEDURE — 3079F DIAST BP 80-89 MM HG: CPT | Mod: CPTII,S$GLB,, | Performed by: INTERNAL MEDICINE

## 2018-07-10 PROCEDURE — 3077F SYST BP >= 140 MM HG: CPT | Mod: CPTII,S$GLB,, | Performed by: INTERNAL MEDICINE

## 2018-07-10 PROCEDURE — 99214 OFFICE O/P EST MOD 30 MIN: CPT | Mod: S$GLB,,, | Performed by: INTERNAL MEDICINE

## 2018-07-10 NOTE — PROGRESS NOTES
Subjective:    Patient ID:  Roma Dunbar is a 67 y.o. female who presents for follow-up of Peripheral Arterial Disease      HPI  66 y/o female with hx of PAD s/p RBKA s/p occluded bypass, CAD s/p CABG, HTN, HLD, COPD, active tobacco use who presents for hospital f/u. She had an arterial doppler which showed occlusion of right bypass and LSFA occlusion. She was admitted and had aortogram with runoff which showed occluded right bypass with extensive collateral circulation on the right and LSFA  with collateralization to single vessel r/f via peroneal. Initially she had LLE claudication, but today denies any claudication symptoms in her left leg. No rest pain, no non healing ulceration, no signs of limb ischemia. Does complain of right stump pain. On ASA/plavix/cilostazol/statin. Compliant with meds.     Review of Systems   Constitution: Negative for weakness and malaise/fatigue.   HENT: Negative for congestion.    Eyes: Negative for blurred vision.   Cardiovascular: Positive for claudication and dyspnea on exertion. Negative for chest pain, cyanosis, irregular heartbeat, leg swelling, near-syncope, orthopnea, palpitations, paroxysmal nocturnal dyspnea and syncope.   Respiratory: Negative for shortness of breath.    Endocrine: Negative for polyuria.   Hematologic/Lymphatic: Negative for bleeding problem.   Skin: Negative for itching and rash.   Musculoskeletal: Positive for back pain. Negative for joint swelling, muscle cramps and muscle weakness.   Gastrointestinal: Negative for abdominal pain, hematemesis, hematochezia, melena, nausea and vomiting.   Genitourinary: Negative for dysuria and hematuria.   Neurological: Negative for dizziness, focal weakness, headaches, light-headedness and loss of balance.   Psychiatric/Behavioral: Negative for depression. The patient is not nervous/anxious.         Objective:    Physical Exam   Constitutional: She is oriented to person, place, and time. She appears well-developed  and well-nourished.   HENT:   Head: Normocephalic and atraumatic.   Neck: Neck supple. No JVD present.   Cardiovascular: Normal rate, regular rhythm and normal heart sounds.    Pulses:       Carotid pulses are 2+ on the right side, and 2+ on the left side.       Radial pulses are 2+ on the right side, and 2+ on the left side.        Femoral pulses are 1+ on the right side, and 2+ on the left side.  Very weak monophasic LPT   Weak monophasic LDP   Pulmonary/Chest: Effort normal and breath sounds normal.   Abdominal: Soft. Bowel sounds are normal.   Musculoskeletal: She exhibits no edema.   Neurological: She is alert and oriented to person, place, and time.   Skin: Skin is warm and dry.   Psychiatric: She has a normal mood and affect. Her behavior is normal. Thought content normal.         Assessment:       1. PAD (peripheral artery disease)    2. Coronary artery disease involving native coronary artery of native heart without angina pectoris    3. Arterial occlusion    4. Atherosclerosis of native artery of both lower extremities with intermittent claudication    5. Hx of right BKA    6. Tobacco dependence    7. Tobacco abuse counseling    8. Mixed hyperlipidemia    9. Essential hypertension    10. Centrilobular emphysema    11. Right lumbar radiculopathy      68 y/o pt with hx and presentation as above. From a PAD perspective, has no claudication symptoms on the left and no non healing ulceration or signs of limb ischemia. No intervention on LSFA  currently indicated. Has extensive right leg PAD and no intervenable lesions. Cont current meds for now including ASA/Plavix/statin/cilostazol. Counseled on appropriate foot hygiene and daily foot inspection.         Plan:       -Continue current medical management  -f/u in 3 months

## 2018-07-19 ENCOUNTER — TELEPHONE (OUTPATIENT)
Dept: SMOKING CESSATION | Facility: CLINIC | Age: 68
End: 2018-07-19

## 2018-07-24 ENCOUNTER — OFFICE VISIT (OUTPATIENT)
Dept: CARDIOLOGY | Facility: CLINIC | Age: 68
End: 2018-07-24
Payer: MEDICARE

## 2018-07-24 VITALS — OXYGEN SATURATION: 98 % | BODY MASS INDEX: 24.29 KG/M2 | HEART RATE: 77 BPM | HEIGHT: 62 IN | WEIGHT: 132 LBS

## 2018-07-24 DIAGNOSIS — E78.2 MIXED HYPERLIPIDEMIA: ICD-10-CM

## 2018-07-24 DIAGNOSIS — I10 ESSENTIAL HYPERTENSION: Primary | ICD-10-CM

## 2018-07-24 DIAGNOSIS — I73.9 PVD (PERIPHERAL VASCULAR DISEASE): ICD-10-CM

## 2018-07-24 DIAGNOSIS — I70.213 ATHEROSCLEROSIS OF NATIVE ARTERY OF BOTH LOWER EXTREMITIES WITH INTERMITTENT CLAUDICATION: ICD-10-CM

## 2018-07-24 DIAGNOSIS — I25.10 CORONARY ARTERY DISEASE INVOLVING NATIVE CORONARY ARTERY OF NATIVE HEART WITHOUT ANGINA PECTORIS: ICD-10-CM

## 2018-07-24 DIAGNOSIS — I73.9 PAD (PERIPHERAL ARTERY DISEASE): ICD-10-CM

## 2018-07-24 PROCEDURE — 99213 OFFICE O/P EST LOW 20 MIN: CPT | Mod: S$GLB,,, | Performed by: INTERNAL MEDICINE

## 2018-07-24 PROCEDURE — 99999 PR PBB SHADOW E&M-EST. PATIENT-LVL III: CPT | Mod: PBBFAC,,, | Performed by: INTERNAL MEDICINE

## 2018-07-24 RX ORDER — LOSARTAN POTASSIUM 50 MG/1
50 TABLET ORAL DAILY
Qty: 90 TABLET | Refills: 3 | Status: SHIPPED | OUTPATIENT
Start: 2018-07-24 | End: 2019-04-15 | Stop reason: SDUPTHER

## 2018-07-24 NOTE — PROGRESS NOTES
Subjective:   Patient ID:  Roma Dunbar is a 67 y.o. female who presents for follow-up of No chief complaint on file.      Problem List Items Addressed This Visit        Cardiac/Vascular    Essential hypertension - Primary    Hyperlipidemia    PVD (peripheral vascular disease)    Coronary artery disease involving native coronary artery without angina pectoris    Atherosclerosis of both lower extremities with intermittent claudication    PAD (peripheral artery disease)          HPI: Patient is here for f/u of the above medical problems. Angiogram showed occlusion of LSFA. She is however denying any further symptoms so will treat medically. She still continues to smoke. BP elevated. She said this is secondary to pain. /62.    Review of Systems   Constitution: Negative.   HENT: Negative.    Eyes: Negative.    Cardiovascular: Negative.    Respiratory: Negative.    Endocrine: Negative.    Hematologic/Lymphatic: Negative.    Skin: Negative.    Musculoskeletal: Negative.    Gastrointestinal: Negative.    Neurological: Negative.        Patient's Medications   New Prescriptions    No medications on file   Previous Medications    AMLODIPINE (NORVASC) 10 MG TABLET    Take 1 tablet (10 mg total) by mouth once daily.    ASPIRIN 325 MG TABLET    Take 325 mg by mouth once daily.    ATORVASTATIN (LIPITOR) 10 MG TABLET    Take 1 tablet (10 mg total) by mouth once daily.    CILOSTAZOL (PLETAL) 50 MG TAB    Take 1 tablet (50 mg total) by mouth 2 (two) times daily.    CLOPIDOGREL (PLAVIX) 75 MG TABLET    Take 1 tablet (75 mg total) by mouth once daily.    NICOTINE (NICODERM CQ) 21 MG/24 HR    Place 1 patch onto the skin once daily.    NICOTINE POLACRILEX 2 MG LOZG    Take 1 lozenge (2 mg total) by mouth as needed (Use in place of cigarettes).   Modified Medications    No medications on file   Discontinued Medications    No medications on file       Objective:   Physical Exam   Constitutional: She is oriented to person, place, and  time. She appears well-developed and well-nourished. No distress.   Examination of the digits showed no clubbing or cyanosis   HENT:   Head: Normocephalic and atraumatic.   Eyes: Conjunctivae are normal. Pupils are equal, round, and reactive to light. Right eye exhibits no discharge.   Neck: Normal range of motion. Neck supple. No JVD present. No thyromegaly present.   No carotid bruits   Cardiovascular: Normal rate, regular rhythm, S1 normal, S2 normal, normal heart sounds, intact distal pulses and normal pulses.  PMI is not displaced.  Exam reveals no gallop, no friction rub and no opening snap.    No murmur heard.  Pulmonary/Chest: Effort normal and breath sounds normal. No respiratory distress. She has no wheezes. She has no rales. She exhibits no tenderness.   Abdominal: Soft. Bowel sounds are normal. She exhibits no distension and no mass. There is no tenderness. There is no guarding.   No hepatosplenomegaly   Musculoskeletal: Normal range of motion. She exhibits no edema or tenderness.   Lymphadenopathy:     She has no cervical adenopathy.   Neurological: She is alert and oriented to person, place, and time.   Skin: Skin is warm. No rash noted. She is not diaphoretic. No erythema.   Psychiatric: She has a normal mood and affect.   Nursing note and vitals reviewed.      ECGs reviewed-NSR  LABS reviewed  Imaging including Echoes reviewed  CABG with 2 vessel LIMA to LAD and SVG to OM1 > 10 years ago.    Hemodynamic Results     LVEDP (Pre): 20 mmHg  LVEDP (Post): 20 mmHg  Ejection Fraction: 65%    E. Angiographic Results 2016     Diagnostic:          Patient has a right dominant coronary artery.        - Left Main Coronary Artery:             The LM is normal. There is MIKO 3 flow.     - Left Anterior Descending Artery:             The proximal LAD has a 80% stenosis. There is MKIO 3 flow.             The mid LAD has a 90% stenosis. There is MIKO 3 flow. Comptetitve flow noted distal to lesion from LIMA-LAD     -  Left Circumflex Artery:             The ostial LCX has a 60% stenosis. There is MIKO 3 flow.             The mid LCX has a 80% stenosis. There is MIKO 3 flow. Small vessel and no comptetive flow noted.      - Right Coronary Artery:             The mid RCA has luminal irregularities has a 40% stenosis. There is MIKO 3 flow. Distally supplies collaterals to left sided circulation     - SVG To UNKNOWN:             The ostial SVG to UNKNOWN is occluded. There is MIKO 0 flow. Surgical marker on the aorta and the graft is likely to an OM     - LIMA To LAD:             The LIMA to LAD is normal. There is MIKO 3 flow.     - Common Femoral Artery:             The right CFA is normal.     - Superficial Femoral Artery:             The left ostial SFA has chronic total occlusion. There is MIKO 0 flow.    Assessment:     1. Essential hypertension    2. Mixed hyperlipidemia    3. PVD (peripheral vascular disease)    4. Coronary artery disease involving native coronary artery of native heart without angina pectoris    5. PAD (peripheral artery disease)    6. Atherosclerosis of native artery of both lower extremities with intermittent claudication        Plan:   Patient was having leg claudication when seen before but now is denying any symptoms. Was seen by Dr. King and will be treated medically.   Continue current medications  Start losartan 50 mg po daily for uncontrolled BP  Follow up on US of legs  Activity as tolerated  Smoking cessation class  F/u in 4 months    > 20 minutes was spent in the care of this patient for evaluation, critical thinking and decision making. Also discussion with patient as to present condition.

## 2018-08-14 NOTE — PLAN OF CARE
Chief Complaint      GI Bleeding (black stools for 4 days, feeling weak, + sob, denies chest pain. + lower abd pain) and Abdominal Pain   for 3 days    Pt states that she is independent with ADLs, no HH, pt has straight cane and WC but does not use WC.    Pt lives alone but has supportive daughters, Kanchan Dunbar [phone] and Josefa Dunbar [phone] who will provide transportation on discharge       08/20/17 1802   Discharge Assessment   Assessment Type Discharge Planning Assessment   Confirmed/corrected address and phone number on facesheet? Yes   Assessment information obtained from? Patient   Expected Length of Stay (days) 2   Communicated expected length of stay with patient/caregiver yes   Prior to hospitilization cognitive status: Alert/Oriented   Prior to hospitalization functional status: Independent   Current cognitive status: Alert/Oriented   Current Functional Status: Independent   Arrived From home or self-care   Is patient able to care for self after discharge? Yes   How many people do you have in your home that can help with your care after discharge? 0   Who are your caregiver(s) and their phone number(s)? Daughters:  Kanchan Dunbar [phone] and Josefa Dunbar [phone]   Patient's perception of discharge disposition home or selfcare   Readmission Within The Last 30 Days no previous admission in last 30 days   Patient currently being followed by outpatient case management? No   Patient currently receives home health services? No   Does the patient currently use HME? Yes   Patient currently receives private duty nursing? No   Patient currently receives any other outside agency services? No   Equipment Currently Used at Home cane, straight;wheelchair  (pt not currently usint WC)   Do you have any problems affording any of your prescribed medications? No   Is the patient taking medications as prescribed? yes   Do you have any financial concerns preventing you from receiving the healthcare you  need? No   Does the patient have transportation to healthcare appointments? Yes   Transportation Available family or friend will provide   On Dialysis? No   Does the patient receive services at the [facility]? No   Are there any open cases? No   Discharge Plan A Home   Discharge Plan B Home with family   Patient/Family In Agreement With Plan yes     Cristal Wadsworth RN Transitional Navigator  [phone]     37

## 2018-09-17 LAB — PERIPHERAL STENOSIS: ABNORMAL

## 2018-10-01 ENCOUNTER — TELEPHONE (OUTPATIENT)
Dept: CARDIOLOGY | Facility: CLINIC | Age: 68
End: 2018-10-01

## 2018-10-01 NOTE — TELEPHONE ENCOUNTER
Reached out to pt.    Pt was scheduled to see you on 10/12. Bere was cancelled due to you being booked out.    Pt already has a f/u scheduled w/ Dr. Ramirez.    Please advise if you'd like for pt to be r/s to see you in clinic as well.

## 2018-10-23 ENCOUNTER — CLINICAL SUPPORT (OUTPATIENT)
Dept: SMOKING CESSATION | Facility: CLINIC | Age: 68
End: 2018-10-23
Payer: COMMERCIAL

## 2018-10-23 DIAGNOSIS — F17.200 NICOTINE DEPENDENCE: Primary | ICD-10-CM

## 2018-10-23 PROCEDURE — 99407 BEHAV CHNG SMOKING > 10 MIN: CPT | Mod: S$GLB,,,

## 2018-10-23 NOTE — PROGRESS NOTES
Successful contact with patient regarding tobacco cessation quit #1. Pt states, she continue to smoke one pack of cigarettes per day and she is ready to return to the program. Pt is scheduled for quit #2 on 10/8/2018.  Pt informed of her benefit status and future telephone follow ups. Will update the tobacco cessation smart form for 6 months on quit #1.

## 2018-11-12 ENCOUNTER — TELEPHONE (OUTPATIENT)
Dept: SMOKING CESSATION | Facility: CLINIC | Age: 68
End: 2018-11-12

## 2018-11-12 NOTE — TELEPHONE ENCOUNTER
Spoke to patient, she's not feeling too well and will call me back when she's better to get back into the program.

## 2018-12-06 ENCOUNTER — TELEPHONE (OUTPATIENT)
Dept: SMOKING CESSATION | Facility: CLINIC | Age: 68
End: 2018-12-06

## 2018-12-06 NOTE — TELEPHONE ENCOUNTER
Left a message regarding a missed appointment and left my number to call back to get back into the program.

## 2019-01-15 ENCOUNTER — OFFICE VISIT (OUTPATIENT)
Dept: URGENT CARE | Facility: CLINIC | Age: 69
End: 2019-01-15
Payer: MEDICARE

## 2019-01-15 ENCOUNTER — TELEPHONE (OUTPATIENT)
Dept: INTERNAL MEDICINE | Facility: CLINIC | Age: 69
End: 2019-01-15

## 2019-01-15 VITALS
BODY MASS INDEX: 23.92 KG/M2 | HEIGHT: 62 IN | RESPIRATION RATE: 16 BRPM | DIASTOLIC BLOOD PRESSURE: 82 MMHG | TEMPERATURE: 98 F | WEIGHT: 130 LBS | OXYGEN SATURATION: 100 % | HEART RATE: 73 BPM | SYSTOLIC BLOOD PRESSURE: 175 MMHG

## 2019-01-15 DIAGNOSIS — L28.2 PRURITIC RASH: ICD-10-CM

## 2019-01-15 DIAGNOSIS — S16.1XXA STRAIN OF CERVICAL PORTION OF LEFT TRAPEZIUS MUSCLE: Primary | ICD-10-CM

## 2019-01-15 PROCEDURE — 1101F PR PT FALLS ASSESS DOC 0-1 FALLS W/OUT INJ PAST YR: ICD-10-PCS | Mod: CPTII,S$GLB,, | Performed by: PHYSICIAN ASSISTANT

## 2019-01-15 PROCEDURE — 3079F DIAST BP 80-89 MM HG: CPT | Mod: CPTII,S$GLB,, | Performed by: PHYSICIAN ASSISTANT

## 2019-01-15 PROCEDURE — 3079F PR MOST RECENT DIASTOLIC BLOOD PRESSURE 80-89 MM HG: ICD-10-PCS | Mod: CPTII,S$GLB,, | Performed by: PHYSICIAN ASSISTANT

## 2019-01-15 PROCEDURE — 3077F SYST BP >= 140 MM HG: CPT | Mod: CPTII,S$GLB,, | Performed by: PHYSICIAN ASSISTANT

## 2019-01-15 PROCEDURE — 1101F PT FALLS ASSESS-DOCD LE1/YR: CPT | Mod: CPTII,S$GLB,, | Performed by: PHYSICIAN ASSISTANT

## 2019-01-15 PROCEDURE — 99214 PR OFFICE/OUTPT VISIT, EST, LEVL IV, 30-39 MIN: ICD-10-PCS | Mod: S$GLB,,, | Performed by: PHYSICIAN ASSISTANT

## 2019-01-15 PROCEDURE — 99214 OFFICE O/P EST MOD 30 MIN: CPT | Mod: S$GLB,,, | Performed by: PHYSICIAN ASSISTANT

## 2019-01-15 PROCEDURE — 3077F PR MOST RECENT SYSTOLIC BLOOD PRESSURE >= 140 MM HG: ICD-10-PCS | Mod: CPTII,S$GLB,, | Performed by: PHYSICIAN ASSISTANT

## 2019-01-15 RX ORDER — TRIAMCINOLONE ACETONIDE 0.25 MG/G
CREAM TOPICAL 2 TIMES DAILY
Qty: 80 G | Refills: 0 | Status: SHIPPED | OUTPATIENT
Start: 2019-01-15 | End: 2019-04-15

## 2019-01-15 RX ORDER — TIZANIDINE 2 MG/1
2 TABLET ORAL EVERY 8 HOURS PRN
Qty: 15 TABLET | Refills: 0 | Status: SHIPPED | OUTPATIENT
Start: 2019-01-15 | End: 2019-01-20

## 2019-01-15 NOTE — PATIENT INSTRUCTIONS
- Rest.    - Drink plenty of fluids.    - Take over-the-counter claritin, zyrtec, allegra, or xyzal as directed for itching and rash.  - Take benadryl as needed for itching and rash.   - cool compresses to rash can help  - tizanidine- this is a muscle relaxer to be used as needed for neck pain. This can cause drowsiness- do not operate heavy machinery while using this medication.     - Tylenol as directed as needed for fever/pain if you do not have liver disease.  - Apply warm compresses to the neck for 20 minutes at a time.    - Follow up with your PCP or specialty clinic as directed in the next 1-2 weeks if not improved or as needed.  You can call [phone] to schedule an appointment with the appropriate provider.    - Go to the ER if your symptoms worsen.    - You must understand that you have received an Urgent Care treatment only and that you may be released before all of your medical problems are known or treated.   - You, the patient, will arrange for follow up care as instructed.   - If your condition worsens or fails to improve we recommend that you receive another evaluation at the ER immediately or contact your PCP to discuss your concerns or return here.           Understanding Cervical Strain    There are 7 bones (vertebrae) in the neck that are part of the spine. These are called the cervical spine. Cervical strain is a medical term for neck pain. The neck has several layers of muscles. These are connected with tendons to the cervical spine and other bones. Neck pain is often the result of injury to these muscles and tendons.  Causes of cervical strain  Different types of stress on the neck can damage muscles and tendons (soft tissues) and cause cervical strain. Cervical tissues can be damaged by:  · The neck being forced past its normal range of motion, such as in a car accident or sports injury  · Constant, low-level stress, such as from poor posture or a poorly set-up workspace  Symptoms of  cervical strain  These may include:  · Neck pain or stiffness  · Pain in the shoulders or upper back  · Muscle spasms  · Headache, often starting at the base of the neck  · Irritability, difficulty concentrating, or sleeplessness  Treatment for cervical strain  This problem often gets better on its own. Treatments aim to reduce pain and inflammation and increase the range of motion of the neck. Possible treatments include:  · Over-the-counter or prescription pain medicine. These help relieve pain and inflammation.  · Stretching exercises to decrease neck stiffness.  · Massage to decrease neck stiffness.  · Cold or heat pack. These help reduce pain and swelling.  Call 911  Call emergency services right away if you have any of these:  · Face drooping or numbness  · Numbness or weakness, especially in the arms or on one side  · Slurred speech or difficulty speaking  · Blurred vision   When to call your healthcare provider  Call your healthcare provider right away if you have any of these:  · Fever of 100.4°F (38°C) or higher, or as directed  · Pain or stiffness that gets worse  · Symptoms that dont get better, or get worse  · Numbness, tingling, weakness or shooting pains into the arms or legs  · New symptoms  Date Last Reviewed: 3/10/2016  © 3880-4810 [employer]. [address] [place] [address], [place] [address]. All rights reserved. This information is not intended as a substitute for professional medical care. Always follow your healthcare professional's instructions.        Self-Care for Skin Rashes     Pat your skin dry. Do not rub.     When your skin reacts to a substance your body is sensitive to, it can cause a rash. You can treat most rashes at home by keeping the skin clean and dry. Many rashes may get better on their own within 2 to 3 days. You may need medical attention if your rash itches, drains, or hurts, particularly if the rash is getting worse.  What can cause a skin rash?  · Sun poisoning,  caused by too much exposure to the sun  · An irritant or allergic reaction to a certain type of food, plant, or chemical, such as  shellfish, poison ivy, and or cleaning products  · An infection caused by a fungus (ringworm), virus (chickenpox), or bacteria (strep)  · Bites or infestation caused by insects or pests, such as ticks, lice, or mites  · Dry skin, which is often seen during the winter months and in older people  How can I control itching and skin damage?  · Take soothing lukewarm baths in a colloidal oatmeal product. You can buy this at the [employer].  · Do your best not to scratch. Clip fingernails short, especially in young children, to reduce skin damage if scratching does occur.  · Use moisturizing skin lotion instead of scratching your dry skin.  · Use sunscreen whenever going out into direct sun.  · Use only mild cleansing agents whenever possible.  · Wash with mild, nonirritating soap and warm water.  · Wear clothing that breathes, such as cotton shirts or canvas shoes.  · If fluid is seeping from the rash, cover it loosely with clean gauze to absorb the discharge.  · Many rashes are contagious. Prevent the rash from spreading to others by washing your hands often before or after touching others with any skin rash.  Use medicine  · Antihistamines such as diphenhydramine can help control itching. But use with caution because they can make you drowsy.  · Using over-the-counter hydrocortisone cream on small rashes may help reduce swelling and itching  · Most over-the-counter antifungal medicines can treat athletes foot and many other fungal infections of the skin.  Check with your healthcare provider  Call your healthcare provider if:  · You were told that you have a fungal infection on your skin to make sure you have the correct type of medicine.  · You have questions or concerns about medicines or their side effects.     Call 911  Call 911 if either of these occur:  · Your tongue or lips start to  swell  · You have difficulty breathing      Call your healthcare provider  Call your healthcare provider if any of these occur:  · Temperature of more than 101.0°F (38.3°C), or as directed  · Sore throat, a cough, or unusual fatigue  · Red, oozy, or painful rash gets worse. These are signs of infection.  · Rash covers your face, genitals, or most of your body  · Crusty sores or red rings that begin to spread  · You were exposed to someone who has a contagious rash, such as scabies or lice.  · Red bulls-eye rash with a white center (a sign of Lyme disease)  · You were told that you have resistant bacteria (MRSA) on your skin.   Date Last Reviewed: 5/12/2015  © 9868-4332 [employer]. [address] [place] [address], [place] [address]. All rights reserved. This information is not intended as a substitute for professional medical care. Always follow your healthcare professional's instructions.

## 2019-01-15 NOTE — PROGRESS NOTES
"Subjective:       Patient ID: Roma Dunbar is a 68 y.o. female.    Vitals:  height is 5' 2" (1.575 m) and weight is 59 kg (130 lb). Her tympanic temperature is 97.5 °F (36.4 °C). Her blood pressure is 175/82 (abnormal) and her pulse is 73. Her respiration is 16 and oxygen saturation is 100%.     Chief Complaint: Rash and Neck Pain    Rash to R arm for 2 months but seems to be more irritating over the past month.  Rash is very pruritic but not painful. It is localized to right upper arm and somewhat extending to right upper back.  She admits to some swelling of the rash. No vesicles, discharge, other lesions. She states that she lives with her daughter who may be using new detergent.  Denies any changes in soaps or contact with any plants.  She states that she did have a change in medication a few months ago, she cannot tell me which medication it was.     Pt also having left-sided neck pain intermittent for 2 months that she notices most sometimes upon waking up.      Rash   This is a new problem. The current episode started more than 1 month ago. The problem has been gradually worsening since onset. The affected locations include the right arm. The rash is characterized by redness and itchiness. She was exposed to nothing. Pertinent negatives include no cough, fever or sore throat. Past treatments include antibiotic cream. The treatment provided no relief. There is no history of allergies, asthma or eczema.   Neck Pain    The current episode started more than 1 month ago. The problem occurs intermittently. The pain is associated with nothing. The quality of the pain is described as aching. The pain is at a severity of 9/10. The pain is severe. The symptoms are aggravated by position. The pain is same all the time. Stiffness is present all day. Pertinent negatives include no fever. Treatments tried: OTC muscle ache rubbing. The treatment provided mild relief.       Constitution: Negative for chills and fever. "   HENT: Negative for facial swelling and sore throat.    Neck: Positive for neck pain. Negative for neck stiffness, painful lymph nodes, neck swelling, degenerative disc disease and bulging disc disease.   Eyes: Negative for eye itching and eyelid swelling.   Respiratory: Negative for cough.    Musculoskeletal: Negative for joint pain and joint swelling.   Skin: Positive for rash. Negative for color change, pale, wound, abrasion, laceration, lesion, skin thickening/induration, puncture wound, erythema, bruising, abscess, avulsion and hives.   Allergic/Immunologic: Positive for itching. Negative for environmental allergies, immunocompromised state and hives.   Hematologic/Lymphatic: Negative for swollen lymph nodes.       Objective:      Physical Exam   Constitutional: She is oriented to person, place, and time. She appears well-developed and well-nourished.   HENT:   Head: Normocephalic and atraumatic. Head is without abrasion, without contusion and without laceration.   Right Ear: External ear normal.   Left Ear: External ear normal.   Nose: Nose normal.   Mouth/Throat: Oropharynx is clear and moist.   Eyes: Conjunctivae, EOM and lids are normal. Pupils are equal, round, and reactive to light.   Neck: Trachea normal, full passive range of motion without pain and phonation normal. Neck supple.   Cardiovascular: Normal rate, regular rhythm and normal heart sounds.   Pulmonary/Chest: Effort normal and breath sounds normal. No stridor. No respiratory distress.   Musculoskeletal: Normal range of motion.        Cervical back: She exhibits tenderness and pain. She exhibits normal range of motion, no bony tenderness, no swelling, no edema, no deformity, no laceration, no spasm and normal pulse.        Thoracic back: Normal. She exhibits normal range of motion, no tenderness, no bony tenderness, no swelling, no edema and no deformity.        Back:    Neurological: She is alert and oriented to person, place, and time.    Skin: Skin is warm, dry and intact. Capillary refill takes less than 2 seconds. No abrasion, no bruising, no burn, no ecchymosis, no laceration, no lesion and no rash noted. No erythema.        Psychiatric: She has a normal mood and affect. Her speech is normal and behavior is normal. Judgment and thought content normal. Cognition and memory are normal.   Nursing note and vitals reviewed.            Rash seems allergic in nature.  I do not suspect cellulitis at this time.  Discussed with patient to begin antihistamines with Kenalog cream.  Instructed her to follow up with primary care provider regarding chronic rash.     Neck pain is muscular in nature, likely postural- sleeping and daytime.    Assessment:       1. Strain of cervical portion of left trapezius muscle    2. Pruritic rash        Plan:         Strain of cervical portion of left trapezius muscle  -     tiZANidine (ZANAFLEX) 2 MG tablet; Take 1 tablet (2 mg total) by mouth every 8 (eight) hours as needed.  Dispense: 15 tablet; Refill: 0    Pruritic rash  -     triamcinolone acetonide 0.025% (KENALOG) 0.025 % cream; Apply topically 2 (two) times daily. for 10 days  Dispense: 80 g; Refill: 0      Patient Instructions     - Rest.    - Drink plenty of fluids.    - Take over-the-counter claritin, zyrtec, allegra, or xyzal as directed for itching and rash.  - Take benadryl as needed for itching and rash.   - cool compresses to rash can help  - tizanidine- this is a muscle relaxer to be used as needed for neck pain. This can cause drowsiness- do not operate heavy machinery while using this medication.     - Tylenol as directed as needed for fever/pain if you do not have liver disease.  - Apply warm compresses to the neck for 20 minutes at a time.    - Follow up with your PCP or specialty clinic as directed in the next 1-2 weeks if not improved or as needed.  You can call [phone] to schedule an appointment with the appropriate provider.    - Go to the ER  if your symptoms worsen.    - You must understand that you have received an Urgent Care treatment only and that you may be released before all of your medical problems are known or treated.   - You, the patient, will arrange for follow up care as instructed.   - If your condition worsens or fails to improve we recommend that you receive another evaluation at the ER immediately or contact your PCP to discuss your concerns or return here.           Understanding Cervical Strain    There are 7 bones (vertebrae) in the neck that are part of the spine. These are called the cervical spine. Cervical strain is a medical term for neck pain. The neck has several layers of muscles. These are connected with tendons to the cervical spine and other bones. Neck pain is often the result of injury to these muscles and tendons.  Causes of cervical strain  Different types of stress on the neck can damage muscles and tendons (soft tissues) and cause cervical strain. Cervical tissues can be damaged by:  · The neck being forced past its normal range of motion, such as in a car accident or sports injury  · Constant, low-level stress, such as from poor posture or a poorly set-up workspace  Symptoms of cervical strain  These may include:  · Neck pain or stiffness  · Pain in the shoulders or upper back  · Muscle spasms  · Headache, often starting at the base of the neck  · Irritability, difficulty concentrating, or sleeplessness  Treatment for cervical strain  This problem often gets better on its own. Treatments aim to reduce pain and inflammation and increase the range of motion of the neck. Possible treatments include:  · Over-the-counter or prescription pain medicine. These help relieve pain and inflammation.  · Stretching exercises to decrease neck stiffness.  · Massage to decrease neck stiffness.  · Cold or heat pack. These help reduce pain and swelling.  Call 911  Call emergency services right away if you have any of these:  · Face  drooping or numbness  · Numbness or weakness, especially in the arms or on one side  · Slurred speech or difficulty speaking  · Blurred vision   When to call your healthcare provider  Call your healthcare provider right away if you have any of these:  · Fever of 100.4°F (38°C) or higher, or as directed  · Pain or stiffness that gets worse  · Symptoms that dont get better, or get worse  · Numbness, tingling, weakness or shooting pains into the arms or legs  · New symptoms  Date Last Reviewed: 3/10/2016  © 0961-8652 [employer]. [address] [place] [address]. All rights reserved. This information is not intended as a substitute for professional medical care. Always follow your healthcare professional's instructions.        Self-Care for Skin Rashes     Pat your skin dry. Do not rub.     When your skin reacts to a substance your body is sensitive to, it can cause a rash. You can treat most rashes at home by keeping the skin clean and dry. Many rashes may get better on their own within 2 to 3 days. You may need medical attention if your rash itches, drains, or hurts, particularly if the rash is getting worse.  What can cause a skin rash?  · Sun poisoning, caused by too much exposure to the sun  · An irritant or allergic reaction to a certain type of food, plant, or chemical, such as  shellfish, poison ivy, and or cleaning products  · An infection caused by a fungus (ringworm), virus (chickenpox), or bacteria (strep)  · Bites or infestation caused by insects or pests, such as ticks, lice, or mites  · Dry skin, which is often seen during the winter months and in older people  How can I control itching and skin damage?  · Take soothing lukewarm baths in a colloidal oatmeal product. You can buy this at the [employer].  · Do your best not to scratch. Clip fingernails short, especially in young children, to reduce skin damage if scratching does occur.  · Use moisturizing skin lotion instead of  scratching your dry skin.  · Use sunscreen whenever going out into direct sun.  · Use only mild cleansing agents whenever possible.  · Wash with mild, nonirritating soap and warm water.  · Wear clothing that breathes, such as cotton shirts or canvas shoes.  · If fluid is seeping from the rash, cover it loosely with clean gauze to absorb the discharge.  · Many rashes are contagious. Prevent the rash from spreading to others by washing your hands often before or after touching others with any skin rash.  Use medicine  · Antihistamines such as diphenhydramine can help control itching. But use with caution because they can make you drowsy.  · Using over-the-counter hydrocortisone cream on small rashes may help reduce swelling and itching  · Most over-the-counter antifungal medicines can treat athletes foot and many other fungal infections of the skin.  Check with your healthcare provider  Call your healthcare provider if:  · You were told that you have a fungal infection on your skin to make sure you have the correct type of medicine.  · You have questions or concerns about medicines or their side effects.     Call 911  Call 911 if either of these occur:  · Your tongue or lips start to swell  · You have difficulty breathing      Call your healthcare provider  Call your healthcare provider if any of these occur:  · Temperature of more than 101.0°F (38.3°C), or as directed  · Sore throat, a cough, or unusual fatigue  · Red, oozy, or painful rash gets worse. These are signs of infection.  · Rash covers your face, genitals, or most of your body  · Crusty sores or red rings that begin to spread  · You were exposed to someone who has a contagious rash, such as scabies or lice.  · Red bulls-eye rash with a white center (a sign of Lyme disease)  · You were told that you have resistant bacteria (MRSA) on your skin.   Date Last Reviewed: 5/12/2015  © 7043-6239 [employer]. [address] [place] [address], [place]  [address]. All rights reserved. This information is not intended as a substitute for professional medical care. Always follow your healthcare professional's instructions.

## 2019-01-15 NOTE — TELEPHONE ENCOUNTER
Called patient, no answer, left VM to call office.          ----- Message from Arin Obrien sent at 1/15/2019  1:49 PM CST -----  Contact: self/[phone]  Patient called to speak with the nurse about getting a written order for her leg to be renewed.  She is an amputee.    Please call and advise.

## 2019-04-02 ENCOUNTER — HOSPITAL ENCOUNTER (EMERGENCY)
Facility: HOSPITAL | Age: 69
Discharge: HOME | End: 2019-04-02
Attending: EMERGENCY MEDICINE
Payer: MEDICARE

## 2019-04-02 VITALS
HEIGHT: 62 IN | OXYGEN SATURATION: 100 % | SYSTOLIC BLOOD PRESSURE: 181 MMHG | HEART RATE: 94 BPM | WEIGHT: 130 LBS | BODY MASS INDEX: 23.92 KG/M2 | RESPIRATION RATE: 16 BRPM | DIASTOLIC BLOOD PRESSURE: 84 MMHG | TEMPERATURE: 98 F

## 2019-04-02 DIAGNOSIS — M54.12 CERVICAL RADICULOPATHY: ICD-10-CM

## 2019-04-02 DIAGNOSIS — M54.2 NECK PAIN: Primary | ICD-10-CM

## 2019-04-02 PROCEDURE — 99284 EMERGENCY DEPT VISIT MOD MDM: CPT

## 2019-04-02 RX ORDER — IBUPROFEN 600 MG/1
600 TABLET ORAL EVERY 6 HOURS PRN
Qty: 20 TABLET | Refills: 0 | Status: SHIPPED | OUTPATIENT
Start: 2019-04-02 | End: 2019-09-06

## 2019-04-02 RX ORDER — METHOCARBAMOL 750 MG/1
1500 TABLET, FILM COATED ORAL 3 TIMES DAILY
Qty: 42 TABLET | Refills: 0 | Status: SHIPPED | OUTPATIENT
Start: 2019-04-02 | End: 2019-04-09

## 2019-04-02 NOTE — ED PROVIDER NOTES
Encounter Date: 4/2/2019    SCRIBE #1 NOTE: I, Michael Bender, am scribing for, and in the presence of,  Dr. Luis Davila. I have scribed the entire note.       History     Chief Complaint   Patient presents with    Neck Pain     left side of neck pain for three weeks.       68 y.o. female presents to the ED with complaint of neck pain x 3 weeks. Patient reports left upper neck pain that extends down her left shoulder with associated tingling down her left arm. She admits to using a new pillow. Patient denies chest pain or SOB.     The history is provided by the patient.     Review of patient's allergies indicates:  No Known Allergies  Past Medical History:   Diagnosis Date    Coronary artery disease     GERD (gastroesophageal reflux disease)     Herpes simplex without mention of complication     Hyperlipidemia     Hypertension     PAD (peripheral artery disease)      Past Surgical History:   Procedure Laterality Date    BKA   mid 2012    Right side at shin level amputation. 2/2 PVD    CARDIAC SURGERY      CORONARY ARTERY BYPASS GRAFT      before Lindsey    ESOPHAGOGASTRODUODENOSCOPY (EGD) N/A 8/21/2017    Performed by Robert Bautista MD at [facility]    HYSTERECTOMY       Family History   Problem Relation Age of Onset    Hypertension Mother     Diabetes Mother     Colon cancer Maternal Grandmother      Social History     Tobacco Use    Smoking status: Current Some Day Smoker     Packs/day: 1.50     Years: 40.00     Pack years: 60.00     Types: Cigarettes    Smokeless tobacco: Never Used   Substance Use Topics    Alcohol use: Yes     Comment: beer on weekends    Drug use: No     Review of Systems   Constitutional: Negative for chills and fever.   HENT: Negative for congestion, rhinorrhea and sore throat.    Eyes: Negative for redness and visual disturbance.   Respiratory: Negative for cough, shortness of breath and wheezing.    Cardiovascular: Negative for chest pain and palpitations.    Gastrointestinal: Negative for abdominal pain, diarrhea, nausea and vomiting.   Genitourinary: Negative for dysuria and hematuria.   Musculoskeletal: Positive for neck pain. Negative for back pain and myalgias.   Skin: Negative for rash.   Neurological: Negative for dizziness, weakness and light-headedness.        + left arm tingling   Psychiatric/Behavioral: Negative for confusion.   All other systems reviewed and are negative.      Physical Exam     Initial Vitals [04/02/19 1028]   BP Pulse Resp Temp SpO2   (!) 181/84 94 16 97.9 °F (36.6 °C) 100 %      MAP       --         Physical Exam    Nursing note and vitals reviewed.  Constitutional: She appears well-developed and well-nourished. No distress.   HENT:   Head: Normocephalic and atraumatic.   Eyes: EOM are normal. Pupils are equal, round, and reactive to light.   Neck: Normal range of motion. Neck supple.   Cardiovascular: Normal rate, regular rhythm, normal heart sounds and intact distal pulses.   No murmur heard.  Pulmonary/Chest: Breath sounds normal. No respiratory distress. She has no wheezes.   Abdominal: Soft. She exhibits no distension. There is no tenderness.   Musculoskeletal: Normal range of motion. She exhibits tenderness. She exhibits no edema.   Tenderness of left SCM muscle extending to left trapezial  ridge.   Neurological: She is alert and oriented to person, place, and time. No cranial nerve deficit or sensory deficit.   Normal muscle strength of left hand.  No sensory deficit.   Skin: Skin is warm and dry.         ED Course   Procedures  Labs Reviewed - No data to display       Imaging Results          X-Ray Cervical Spine AP And Lateral (Final result)  Result time 04/02/19 11:59:38    Final result by Eduin Baron MD (04/02/19 11:59:38)                 Impression:      1. No acute displaced fracture or dislocation of the cervical spine noting degenerative changes.      Electronically signed by: Eduin Baron  MD  Date:    04/02/2019  Time:    11:59             Narrative:    EXAMINATION:  XR CERVICAL SPINE AP LATERAL    CLINICAL HISTORY:  neck pain;    TECHNIQUE:  AP, lateral and open mouth views of the cervical spine were performed.    COMPARISON:  None.    FINDINGS:  Three views.    Lateral imaging demonstrates adequate alignment of the cervical spine without significant vertebral body height loss.  There is disc space height loss and disc degenerative disease primarily involving C5-C6.  There is multilevel marginal anterior osteophytosis.  The facet joints are aligned.  AP spinal alignment is grossly unremarkable allowing for motion blurring.  The visualized lung apices are grossly clear.  Sternotomy noted.  The odontoid appears grossly intact.  The lateral masses of C1 are in anatomic position as related to C2.  The paraspinous and hypopharyngeal soft tissues are grossly unremarkable.  The airway is patent.                                 Medical Decision Making:   Clinical Tests:   Radiological Study: Ordered and Reviewed  ED Management:  68-year-old female with symptoms suggestive of a cervical radiculopathy.  Cervical x-rays unremarkable.  I will place her on ibuprofen Robaxin.  I have advised close follow-up with the primary physician as soon as able for recheck, further treatment as warranted and/or referral.                      Clinical Impression:       ICD-10-CM ICD-9-CM   1. Neck pain M54.2 723.1   2. Cervical radiculopathy M54.12 723.4           Disposition:   Disposition: Discharged  Condition: Stable       I, Dr. Luis Nesbitt, personally performed the services described in this documentation. All medical record entries made by the scribe were at my direction and in my presence. I have reviewed the chart and agree that the record reflects my personal performance and is accurate and complete. Luis Nesbitt MD.  3:55 PM 04/02/2019                   Luis Nesbitt MD  04/02/19 1556

## 2019-04-15 ENCOUNTER — OFFICE VISIT (OUTPATIENT)
Dept: INTERNAL MEDICINE | Facility: CLINIC | Age: 69
End: 2019-04-15
Payer: MEDICARE

## 2019-04-15 VITALS
WEIGHT: 143.75 LBS | OXYGEN SATURATION: 99 % | DIASTOLIC BLOOD PRESSURE: 78 MMHG | SYSTOLIC BLOOD PRESSURE: 176 MMHG | HEART RATE: 82 BPM | BODY MASS INDEX: 26.45 KG/M2 | HEIGHT: 62 IN

## 2019-04-15 DIAGNOSIS — Z23 NEED FOR PROPHYLACTIC VACCINATION WITH STREPTOCOCCUS PNEUMONIAE (PNEUMOCOCCUS) AND INFLUENZA VACCINES: ICD-10-CM

## 2019-04-15 DIAGNOSIS — I25.10 CORONARY ARTERY DISEASE, ANGINA PRESENCE UNSPECIFIED, UNSPECIFIED VESSEL OR LESION TYPE, UNSPECIFIED WHETHER NATIVE OR TRANSPLANTED HEART: ICD-10-CM

## 2019-04-15 DIAGNOSIS — E66.3 OVERWEIGHT (BMI 25.0-29.9): ICD-10-CM

## 2019-04-15 DIAGNOSIS — I10 ESSENTIAL HYPERTENSION: Primary | ICD-10-CM

## 2019-04-15 DIAGNOSIS — F17.200 TOBACCO DEPENDENCE: ICD-10-CM

## 2019-04-15 DIAGNOSIS — Z00.00 PREVENTATIVE HEALTH CARE: ICD-10-CM

## 2019-04-15 PROCEDURE — G0009 PNEUMOCOCCAL POLYSACCHARIDE VACCINE 23-VALENT =>2YO SQ IM: ICD-10-PCS | Mod: S$GLB,,, | Performed by: INTERNAL MEDICINE

## 2019-04-15 PROCEDURE — 99999 PR PBB SHADOW E&M-EST. PATIENT-LVL IV: CPT | Mod: PBBFAC,,, | Performed by: INTERNAL MEDICINE

## 2019-04-15 PROCEDURE — 90732 PNEUMOCOCCAL POLYSACCHARIDE VACCINE 23-VALENT =>2YO SQ IM: ICD-10-PCS | Mod: S$GLB,,, | Performed by: INTERNAL MEDICINE

## 2019-04-15 PROCEDURE — 3078F PR MOST RECENT DIASTOLIC BLOOD PRESSURE < 80 MM HG: ICD-10-PCS | Mod: CPTII,S$GLB,, | Performed by: INTERNAL MEDICINE

## 2019-04-15 PROCEDURE — 99214 PR OFFICE/OUTPT VISIT, EST, LEVL IV, 30-39 MIN: ICD-10-PCS | Mod: 25,S$GLB,, | Performed by: INTERNAL MEDICINE

## 2019-04-15 PROCEDURE — 3078F DIAST BP <80 MM HG: CPT | Mod: CPTII,S$GLB,, | Performed by: INTERNAL MEDICINE

## 2019-04-15 PROCEDURE — 1101F PT FALLS ASSESS-DOCD LE1/YR: CPT | Mod: CPTII,S$GLB,, | Performed by: INTERNAL MEDICINE

## 2019-04-15 PROCEDURE — 3077F SYST BP >= 140 MM HG: CPT | Mod: CPTII,S$GLB,, | Performed by: INTERNAL MEDICINE

## 2019-04-15 PROCEDURE — 90732 PPSV23 VACC 2 YRS+ SUBQ/IM: CPT | Mod: S$GLB,,, | Performed by: INTERNAL MEDICINE

## 2019-04-15 PROCEDURE — 99214 OFFICE O/P EST MOD 30 MIN: CPT | Mod: 25,S$GLB,, | Performed by: INTERNAL MEDICINE

## 2019-04-15 PROCEDURE — 99999 PR PBB SHADOW E&M-EST. PATIENT-LVL IV: ICD-10-PCS | Mod: PBBFAC,,, | Performed by: INTERNAL MEDICINE

## 2019-04-15 PROCEDURE — 1101F PR PT FALLS ASSESS DOC 0-1 FALLS W/OUT INJ PAST YR: ICD-10-PCS | Mod: CPTII,S$GLB,, | Performed by: INTERNAL MEDICINE

## 2019-04-15 PROCEDURE — G0009 ADMIN PNEUMOCOCCAL VACCINE: HCPCS | Mod: S$GLB,,, | Performed by: INTERNAL MEDICINE

## 2019-04-15 PROCEDURE — 3077F PR MOST RECENT SYSTOLIC BLOOD PRESSURE >= 140 MM HG: ICD-10-PCS | Mod: CPTII,S$GLB,, | Performed by: INTERNAL MEDICINE

## 2019-04-15 RX ORDER — AMLODIPINE BESYLATE 10 MG/1
10 TABLET ORAL NIGHTLY
Qty: 90 TABLET | Refills: 0 | Status: SHIPPED | OUTPATIENT
Start: 2019-04-15 | End: 2019-05-20 | Stop reason: SDUPTHER

## 2019-04-15 RX ORDER — METOPROLOL SUCCINATE 25 MG/1
25 TABLET, EXTENDED RELEASE ORAL 2 TIMES DAILY
Qty: 60 TABLET | Refills: 1 | Status: SHIPPED | OUTPATIENT
Start: 2019-04-15 | End: 2019-05-20

## 2019-04-15 RX ORDER — LOSARTAN POTASSIUM 50 MG/1
50 TABLET ORAL DAILY
Qty: 90 TABLET | Refills: 0 | Status: SHIPPED | OUTPATIENT
Start: 2019-04-15 | End: 2019-05-20

## 2019-04-15 NOTE — PROGRESS NOTES
Pt. ID: Roma Dunbar is a 68 y.o. female      Chief complaint:   Chief Complaint   Patient presents with    Follow-up     HTN       HPI: pt. Here for f/u for HTN; she has not increased metoprolol to 25 mg BID and thinks she may only be on 2 BP meds; I explained risks of non-compliance and BP is elevated; BP meds were reviewed with pt. In detail; she has gained weight; of note, she has f/u cardiology for CAD and was asked to continue current regimen; she smokes PPD and she does not want smoking cessation; she would like pneumovax; she takes asa 81 mg QD      Review of Systems   Constitutional: Negative for chills and fever.   Respiratory: Negative for cough and shortness of breath.    Cardiovascular: Negative for chest pain.   Gastrointestinal: Negative for abdominal pain, nausea and vomiting.   Genitourinary: Negative for dysuria.         Objective:    Physical Exam   Constitutional: She is oriented to person, place, and time.   Overweight    Eyes: EOM are normal.   Neck: Normal range of motion.   Cardiovascular: Normal rate, regular rhythm and normal heart sounds.   Pulmonary/Chest: Effort normal and breath sounds normal. No respiratory distress. She has no wheezes. She has no rales.   Abdominal: Soft. There is no tenderness. There is no rebound and no guarding.   Musculoskeletal: Normal range of motion.   Neurological: She is alert and oriented to person, place, and time.   Skin: No rash noted.   Vitals reviewed.        Health Maintenance   Topic Date Due    LDCT Lung Screen  01/28/2016    Influenza Vaccine  11/21/2019 (Originally 8/1/2018)    Colonoscopy  03/16/2020    High Dose Statin  04/15/2020    Aspirin/Antiplatelet Therapy  04/15/2020    Mammogram  04/18/2020    DEXA SCAN  04/02/2021    Lipid Panel  03/07/2023    TETANUS VACCINE  02/16/2028    Hepatitis C Screening  Completed    Pneumococcal Vaccine (65+ Low/Medium Risk)  Completed    Zoster Vaccine  Addressed         Assessment:     1.  Essential hypertension Sub-optimally controlled   2. Coronary artery disease, angina presence unspecified, unspecified vessel or lesion type, unspecified whether native or transplanted heart Well controlled   3. Tobacco dependence Sub-optimally controlled   4. Overweight (BMI 25.0-29.9) Sub-optimally controlled   5. Preventative health care Active   6. Need for prophylactic vaccination with Streptococcus pneumoniae (Pneumococcus) and Influenza vaccines Active         Plan: Essential hypertension  Comments:  asked pt. to restart metoprolal 25 mg BID and take losartan QAM and norvasc QPM; encouraged low Na diet and weight loss; repeat BP on f/u in 1 month   Orders:  -     losartan (COZAAR) 50 MG tablet; Take 1 tablet (50 mg total) by mouth once daily.  Dispense: 90 tablet; Refill: 0  -     amLODIPine (NORVASC) 10 MG tablet; Take 1 tablet (10 mg total) by mouth every evening.  Dispense: 90 tablet; Refill: 0  -     metoprolol succinate (TOPROL-XL) 25 MG 24 hr tablet; Take 1 tablet (25 mg total) by mouth 2 (two) times daily.  Dispense: 60 tablet; Refill: 1  -     CBC auto differential; Future; Expected date: 05/06/2019  -     Comprehensive metabolic panel; Future; Expected date: 05/06/2019  -     Urinalysis; Future; Expected date: 05/06/2019    Coronary artery disease, angina presence unspecified, unspecified vessel or lesion type, unspecified whether native or transplanted heart  Comments:  continue current regimen and f/u cardiology who is managing     Tobacco dependence  Comments:  encouraged cessation and explained risks; asked pt. to resume smoking cessation course     Overweight (BMI 25.0-29.9)  Comments:  encouraged diet     Preventative health care  -     CBC auto differential; Future; Expected date: 05/06/2019  -     Comprehensive metabolic panel; Future; Expected date: 05/06/2019  -     Hemoglobin A1c; Future; Expected date: 05/06/2019  -     Lipid panel; Future; Expected date: 05/06/2019  -     Urinalysis;  Future; Expected date: 05/06/2019    Need for prophylactic vaccination with Streptococcus pneumoniae (Pneumococcus) and Influenza vaccines  -     Pneumococcal Polysaccharide Vaccine (23 Valent) (SQ/IM)        Problem List Items Addressed This Visit        Cardiac/Vascular    Essential hypertension - Primary    Relevant Medications    losartan (COZAAR) 50 MG tablet    amLODIPine (NORVASC) 10 MG tablet    metoprolol succinate (TOPROL-XL) 25 MG 24 hr tablet    Other Relevant Orders    CBC auto differential    Comprehensive metabolic panel    Urinalysis    Coronary artery disease    Overview     continue current regimen and refer to cardiology in area as per pt. request            ID    Need for prophylactic vaccination with Streptococcus pneumoniae (Pneumococcus) and Influenza vaccines    Relevant Orders    Pneumococcal Polysaccharide Vaccine (23 Valent) (SQ/IM) (Completed)       Endocrine    Overweight (BMI 25.0-29.9)       Other    Tobacco dependence    Preventative health care    Relevant Orders    CBC auto differential    Comprehensive metabolic panel    Hemoglobin A1c    Lipid panel    Urinalysis

## 2019-05-02 DIAGNOSIS — I73.9 PVD (PERIPHERAL VASCULAR DISEASE): ICD-10-CM

## 2019-05-02 DIAGNOSIS — I10 ESSENTIAL HYPERTENSION: ICD-10-CM

## 2019-05-02 RX ORDER — LISINOPRIL AND HYDROCHLOROTHIAZIDE 12.5; 2 MG/1; MG/1
TABLET ORAL
Qty: 180 TABLET | Refills: 0 | OUTPATIENT
Start: 2019-05-02

## 2019-05-02 RX ORDER — CILOSTAZOL 50 MG/1
TABLET ORAL
Qty: 90 TABLET | Refills: 0 | Status: SHIPPED | OUTPATIENT
Start: 2019-05-02

## 2019-05-15 ENCOUNTER — LAB VISIT (OUTPATIENT)
Dept: LAB | Facility: HOSPITAL | Age: 69
End: 2019-05-15
Attending: INTERNAL MEDICINE
Payer: MEDICARE

## 2019-05-15 DIAGNOSIS — I10 ESSENTIAL HYPERTENSION: ICD-10-CM

## 2019-05-15 DIAGNOSIS — Z00.00 PREVENTATIVE HEALTH CARE: ICD-10-CM

## 2019-05-15 DIAGNOSIS — Z00.00 PREVENTATIVE HEALTH CARE: Primary | ICD-10-CM

## 2019-05-15 LAB
ALBUMIN SERPL BCP-MCNC: 3.8 G/DL (ref 3.5–5.2)
ALP SERPL-CCNC: 87 U/L (ref 55–135)
ALT SERPL W/O P-5'-P-CCNC: 12 U/L (ref 10–44)
ANION GAP SERPL CALC-SCNC: 11 MMOL/L (ref 8–16)
AST SERPL-CCNC: 17 U/L (ref 10–40)
BASOPHILS # BLD AUTO: 0.04 K/UL (ref 0–0.2)
BASOPHILS NFR BLD: 0.6 % (ref 0–1.9)
BILIRUB SERPL-MCNC: 0.4 MG/DL (ref 0.1–1)
BUN SERPL-MCNC: 18 MG/DL (ref 8–23)
CALCIUM SERPL-MCNC: 10.1 MG/DL (ref 8.7–10.5)
CHLORIDE SERPL-SCNC: 108 MMOL/L (ref 95–110)
CHOLEST SERPL-MCNC: 164 MG/DL (ref 120–199)
CHOLEST/HDLC SERPL: 2.5 {RATIO} (ref 2–5)
CO2 SERPL-SCNC: 22 MMOL/L (ref 23–29)
CREAT SERPL-MCNC: 0.8 MG/DL (ref 0.5–1.4)
DIFFERENTIAL METHOD: ABNORMAL
EOSINOPHIL # BLD AUTO: 0.6 K/UL (ref 0–0.5)
EOSINOPHIL NFR BLD: 8.9 % (ref 0–8)
ERYTHROCYTE [DISTWIDTH] IN BLOOD BY AUTOMATED COUNT: 14.6 % (ref 11.5–14.5)
EST. GFR  (AFRICAN AMERICAN): >60 ML/MIN/1.73 M^2
EST. GFR  (NON AFRICAN AMERICAN): >60 ML/MIN/1.73 M^2
ESTIMATED AVG GLUCOSE: 105 MG/DL (ref 68–131)
GLUCOSE SERPL-MCNC: 95 MG/DL (ref 70–110)
HBA1C MFR BLD HPLC: 5.3 % (ref 4–5.6)
HCT VFR BLD AUTO: 43.7 % (ref 37–48.5)
HDLC SERPL-MCNC: 65 MG/DL (ref 40–75)
HDLC SERPL: 39.6 % (ref 20–50)
HGB BLD-MCNC: 14.3 G/DL (ref 12–16)
IMM GRANULOCYTES # BLD AUTO: 0.02 K/UL (ref 0–0.04)
IMM GRANULOCYTES NFR BLD AUTO: 0.3 % (ref 0–0.5)
LDLC SERPL CALC-MCNC: 88.6 MG/DL (ref 63–159)
LYMPHOCYTES # BLD AUTO: 1.3 K/UL (ref 1–4.8)
LYMPHOCYTES NFR BLD: 20.6 % (ref 18–48)
MCH RBC QN AUTO: 30.6 PG (ref 27–31)
MCHC RBC AUTO-ENTMCNC: 32.7 G/DL (ref 32–36)
MCV RBC AUTO: 94 FL (ref 82–98)
MONOCYTES # BLD AUTO: 0.7 K/UL (ref 0.3–1)
MONOCYTES NFR BLD: 10.9 % (ref 4–15)
NEUTROPHILS # BLD AUTO: 3.8 K/UL (ref 1.8–7.7)
NEUTROPHILS NFR BLD: 58.7 % (ref 38–73)
NONHDLC SERPL-MCNC: 99 MG/DL
NRBC BLD-RTO: 0 /100 WBC
PLATELET # BLD AUTO: 139 K/UL (ref 150–350)
PMV BLD AUTO: 12.7 FL (ref 9.2–12.9)
POTASSIUM SERPL-SCNC: 4.2 MMOL/L (ref 3.5–5.1)
PROT SERPL-MCNC: 7.8 G/DL (ref 6–8.4)
RBC # BLD AUTO: 4.67 M/UL (ref 4–5.4)
SODIUM SERPL-SCNC: 141 MMOL/L (ref 136–145)
TRIGL SERPL-MCNC: 52 MG/DL (ref 30–150)
WBC # BLD AUTO: 6.51 K/UL (ref 3.9–12.7)

## 2019-05-15 PROCEDURE — 36415 COLL VENOUS BLD VENIPUNCTURE: CPT | Mod: PO

## 2019-05-15 PROCEDURE — 80053 COMPREHEN METABOLIC PANEL: CPT

## 2019-05-15 PROCEDURE — 85025 COMPLETE CBC W/AUTO DIFF WBC: CPT

## 2019-05-15 PROCEDURE — 80061 LIPID PANEL: CPT

## 2019-05-15 PROCEDURE — 83036 HEMOGLOBIN GLYCOSYLATED A1C: CPT

## 2019-05-20 ENCOUNTER — TELEPHONE (OUTPATIENT)
Dept: FAMILY MEDICINE | Facility: CLINIC | Age: 69
End: 2019-05-20

## 2019-05-20 ENCOUNTER — OFFICE VISIT (OUTPATIENT)
Dept: INTERNAL MEDICINE | Facility: CLINIC | Age: 69
End: 2019-05-20
Payer: MEDICARE

## 2019-05-20 ENCOUNTER — LAB VISIT (OUTPATIENT)
Dept: LAB | Facility: HOSPITAL | Age: 69
End: 2019-05-20
Attending: INTERNAL MEDICINE
Payer: MEDICARE

## 2019-05-20 VITALS
DIASTOLIC BLOOD PRESSURE: 78 MMHG | OXYGEN SATURATION: 98 % | HEIGHT: 62 IN | WEIGHT: 140.88 LBS | HEART RATE: 72 BPM | SYSTOLIC BLOOD PRESSURE: 194 MMHG | BODY MASS INDEX: 25.92 KG/M2

## 2019-05-20 DIAGNOSIS — I10 ESSENTIAL HYPERTENSION: Primary | ICD-10-CM

## 2019-05-20 DIAGNOSIS — I10 ESSENTIAL HYPERTENSION: ICD-10-CM

## 2019-05-20 DIAGNOSIS — Z00.00 PREVENTATIVE HEALTH CARE: ICD-10-CM

## 2019-05-20 DIAGNOSIS — E78.2 MIXED HYPERLIPIDEMIA: ICD-10-CM

## 2019-05-20 DIAGNOSIS — J30.9 ALLERGIC RHINITIS, UNSPECIFIED SEASONALITY, UNSPECIFIED TRIGGER: ICD-10-CM

## 2019-05-20 DIAGNOSIS — F17.200 TOBACCO DEPENDENCE: ICD-10-CM

## 2019-05-20 DIAGNOSIS — N39.0 URINARY TRACT INFECTION WITHOUT HEMATURIA, SITE UNSPECIFIED: Primary | ICD-10-CM

## 2019-05-20 DIAGNOSIS — D69.6 THROMBOCYTOPENIA: ICD-10-CM

## 2019-05-20 LAB
BACTERIA #/AREA URNS AUTO: ABNORMAL /HPF
BILIRUB UR QL STRIP: NEGATIVE
CLARITY UR REFRACT.AUTO: ABNORMAL
COLOR UR AUTO: YELLOW
GLUCOSE UR QL STRIP: NEGATIVE
HGB UR QL STRIP: NEGATIVE
KETONES UR QL STRIP: NEGATIVE
LEUKOCYTE ESTERASE UR QL STRIP: ABNORMAL
MICROSCOPIC COMMENT: ABNORMAL
NITRITE UR QL STRIP: NEGATIVE
PH UR STRIP: 5 [PH] (ref 5–8)
PROT UR QL STRIP: NEGATIVE
RBC #/AREA URNS AUTO: 1 /HPF (ref 0–4)
SP GR UR STRIP: 1.01 (ref 1–1.03)
SQUAMOUS #/AREA URNS AUTO: 6 /HPF
URN SPEC COLLECT METH UR: ABNORMAL
WBC #/AREA URNS AUTO: 28 /HPF (ref 0–5)

## 2019-05-20 PROCEDURE — 3078F DIAST BP <80 MM HG: CPT | Mod: CPTII,S$GLB,, | Performed by: INTERNAL MEDICINE

## 2019-05-20 PROCEDURE — 3077F PR MOST RECENT SYSTOLIC BLOOD PRESSURE >= 140 MM HG: ICD-10-PCS | Mod: CPTII,S$GLB,, | Performed by: INTERNAL MEDICINE

## 2019-05-20 PROCEDURE — 99999 PR PBB SHADOW E&M-EST. PATIENT-LVL IV: ICD-10-PCS | Mod: PBBFAC,,, | Performed by: INTERNAL MEDICINE

## 2019-05-20 PROCEDURE — 1101F PR PT FALLS ASSESS DOC 0-1 FALLS W/OUT INJ PAST YR: ICD-10-PCS | Mod: CPTII,S$GLB,, | Performed by: INTERNAL MEDICINE

## 2019-05-20 PROCEDURE — 81001 URINALYSIS AUTO W/SCOPE: CPT

## 2019-05-20 PROCEDURE — 99214 PR OFFICE/OUTPT VISIT, EST, LEVL IV, 30-39 MIN: ICD-10-PCS | Mod: S$GLB,,, | Performed by: INTERNAL MEDICINE

## 2019-05-20 PROCEDURE — 99214 OFFICE O/P EST MOD 30 MIN: CPT | Mod: S$GLB,,, | Performed by: INTERNAL MEDICINE

## 2019-05-20 PROCEDURE — 3077F SYST BP >= 140 MM HG: CPT | Mod: CPTII,S$GLB,, | Performed by: INTERNAL MEDICINE

## 2019-05-20 PROCEDURE — 3078F PR MOST RECENT DIASTOLIC BLOOD PRESSURE < 80 MM HG: ICD-10-PCS | Mod: CPTII,S$GLB,, | Performed by: INTERNAL MEDICINE

## 2019-05-20 PROCEDURE — 99999 PR PBB SHADOW E&M-EST. PATIENT-LVL IV: CPT | Mod: PBBFAC,,, | Performed by: INTERNAL MEDICINE

## 2019-05-20 PROCEDURE — 1101F PT FALLS ASSESS-DOCD LE1/YR: CPT | Mod: CPTII,S$GLB,, | Performed by: INTERNAL MEDICINE

## 2019-05-20 RX ORDER — METHOCARBAMOL 750 MG/1
500 TABLET, FILM COATED ORAL 2 TIMES DAILY
COMMUNITY
End: 2019-06-10

## 2019-05-20 RX ORDER — LOSARTAN POTASSIUM 50 MG/1
50 TABLET ORAL 2 TIMES DAILY
Qty: 180 TABLET | Refills: 0 | Status: SHIPPED | OUTPATIENT
Start: 2019-05-20 | End: 2019-08-26 | Stop reason: SDUPTHER

## 2019-05-20 RX ORDER — METOPROLOL SUCCINATE 25 MG/1
25 TABLET, EXTENDED RELEASE ORAL 2 TIMES DAILY
Qty: 180 TABLET | Refills: 0 | Status: SHIPPED | OUTPATIENT
Start: 2019-05-20 | End: 2019-09-06 | Stop reason: SDUPTHER

## 2019-05-20 RX ORDER — AMLODIPINE BESYLATE 10 MG/1
10 TABLET ORAL NIGHTLY
Qty: 90 TABLET | Refills: 0 | Status: SHIPPED | OUTPATIENT
Start: 2019-05-20 | End: 2019-10-07

## 2019-05-20 NOTE — PROGRESS NOTES
Pt. ID: Roma Dunbar is a 68 y.o. female      Chief complaint:   Chief Complaint   Patient presents with    Follow-up       HPI: Pt. Here for f/u for HTN; she has not restarted metoprolal 25 mg BID and is not taking losartan/norvasc QPM; I explained risks of non-compliance and asked pt. To proceed to ER for symptoms;  BP is elevated and repeat was also elevated; I reviewed labs dated 5/15/19; platelets are mildly low; HGBA1C is 5.3; cholesterol is WNL; she smokes PP 2 days and she would like smoking cessation course ; she will get shingles vaccine at local pharmacy; she reports allergies and will try OTC claritin      Review of Systems   Constitutional: Negative for chills and fever.   HENT: Positive for congestion.         Allergies    Respiratory: Negative for cough and shortness of breath.    Cardiovascular: Negative for chest pain.   Gastrointestinal: Negative for abdominal pain, nausea and vomiting.   Genitourinary: Negative for dysuria.   Musculoskeletal:        R BKA with prosthesis          Objective:    Physical Exam   Constitutional: She is oriented to person, place, and time.   Eyes: EOM are normal.   Neck: Normal range of motion.   Cardiovascular: Normal rate, regular rhythm and normal heart sounds.   Pulmonary/Chest: Effort normal and breath sounds normal. No respiratory distress. She has no wheezes. She has no rales.   Abdominal: Soft. There is no tenderness. There is no rebound and no guarding.   Musculoskeletal: Normal range of motion.   R BKA with prosthesis    Neurological: She is alert and oriented to person, place, and time.   Skin: No rash noted.   Vitals reviewed.        Health Maintenance   Topic Date Due    LDCT Lung Screen  01/28/2016    Influenza Vaccine  11/21/2019 (Originally 8/1/2019)    Colonoscopy  03/16/2020    High Dose Statin  04/15/2020    Aspirin/Antiplatelet Therapy  04/15/2020    Mammogram  04/18/2020    DEXA SCAN  04/02/2021    Lipid Panel  05/15/2024    TETANUS  VACCINE  02/16/2028    Hepatitis C Screening  Completed    Pneumococcal Vaccine (65+ Low/Medium Risk)  Completed         Assessment:     1. Essential hypertension Poorly controlled   2. Mixed hyperlipidemia Well controlled   3. Thrombocytopenia Active   4. Allergic rhinitis, unspecified seasonality, unspecified trigger Active   5. Tobacco dependence Sub-optimally controlled         Plan: Essential hypertension  Comments:  asked pt. to change metoprolal to BID as well as losartan to BID; change norvasc to QPM; encouraged low Na diet and weight loss; repeat BP on f/u in 3 weeks   Orders:  -     Comprehensive metabolic panel; Future; Expected date: 06/10/2019  -     CBC auto differential; Future; Expected date: 06/10/2019  -     amLODIPine (NORVASC) 10 MG tablet; Take 1 tablet (10 mg total) by mouth every evening.  Dispense: 90 tablet; Refill: 0  -     losartan (COZAAR) 50 MG tablet; Take 1 tablet (50 mg total) by mouth 2 (two) times daily.  Dispense: 180 tablet; Refill: 0  -     metoprolol succinate (TOPROL-XL) 25 MG 24 hr tablet; Take 1 tablet (25 mg total) by mouth 2 (two) times daily.  Dispense: 180 tablet; Refill: 0    Mixed hyperlipidemia  Comments:  continue current regimen and encouraged diet modification    Thrombocytopenia  Comments:  repeat CBC prior to 3 week f/u for surveillence   Orders:  -     CBC auto differential; Future; Expected date: 06/10/2019    Allergic rhinitis, unspecified seasonality, unspecified trigger  Comments:  asked pt. to try claritin prn     Tobacco dependence  Comments:  encouraged cessation and explained risks   Orders:  -     Ambulatory referral to Smoking Cessation Program        Problem List Items Addressed This Visit        Cardiac/Vascular    Hyperlipemia    Essential hypertension - Primary    Relevant Medications    amLODIPine (NORVASC) 10 MG tablet    losartan (COZAAR) 50 MG tablet    metoprolol succinate (TOPROL-XL) 25 MG 24 hr tablet    Other Relevant Orders     Comprehensive metabolic panel    CBC auto differential       Hematology    Thrombocytopenia    Relevant Orders    CBC auto differential       Other    Tobacco dependence    Relevant Orders    Ambulatory referral to Smoking Cessation Program    Allergic rhinitis

## 2019-05-21 RX ORDER — SULFAMETHOXAZOLE AND TRIMETHOPRIM 800; 160 MG/1; MG/1
1 TABLET ORAL 2 TIMES DAILY
Qty: 10 TABLET | Refills: 0 | Status: SHIPPED | OUTPATIENT
Start: 2019-05-21 | End: 2019-05-26

## 2019-05-21 NOTE — TELEPHONE ENCOUNTER
Called and spoke w/ patient, the only symptom she is having is frequent urination every 15 to 20  minutes

## 2019-06-05 ENCOUNTER — TELEPHONE (OUTPATIENT)
Dept: INTERNAL MEDICINE | Facility: CLINIC | Age: 69
End: 2019-06-05

## 2019-06-05 NOTE — TELEPHONE ENCOUNTER
Called and spoke w/ patient. Patient asking if we have received a form to be filled out from [employer], Dr. Akin Bowles [phone]. Called office, they will fax form to Dr. Ferro's office.        ----- Message from Cassy Billings sent at 6/5/2019  2:33 PM CDT -----  Contact: Pt  Patient called in regards to having Dr. Ferro sign a paper. Not quite sure what the paper  is concerning. Please contact patient. Thank you    Patient can be reached at [phone]

## 2019-06-07 ENCOUNTER — LAB VISIT (OUTPATIENT)
Dept: LAB | Facility: HOSPITAL | Age: 69
End: 2019-06-07
Attending: INTERNAL MEDICINE
Payer: MEDICARE

## 2019-06-07 DIAGNOSIS — D69.6 THROMBOCYTOPENIA: ICD-10-CM

## 2019-06-07 DIAGNOSIS — I10 ESSENTIAL HYPERTENSION: ICD-10-CM

## 2019-06-07 LAB
ALBUMIN SERPL BCP-MCNC: 3.9 G/DL (ref 3.5–5.2)
ALP SERPL-CCNC: 85 U/L (ref 55–135)
ALT SERPL W/O P-5'-P-CCNC: 13 U/L (ref 10–44)
ANION GAP SERPL CALC-SCNC: 11 MMOL/L (ref 8–16)
AST SERPL-CCNC: 18 U/L (ref 10–40)
BASOPHILS # BLD AUTO: 0.04 K/UL (ref 0–0.2)
BASOPHILS NFR BLD: 0.7 % (ref 0–1.9)
BILIRUB SERPL-MCNC: 0.7 MG/DL (ref 0.1–1)
BUN SERPL-MCNC: 14 MG/DL (ref 8–23)
CALCIUM SERPL-MCNC: 10.1 MG/DL (ref 8.7–10.5)
CHLORIDE SERPL-SCNC: 107 MMOL/L (ref 95–110)
CO2 SERPL-SCNC: 20 MMOL/L (ref 23–29)
CREAT SERPL-MCNC: 0.8 MG/DL (ref 0.5–1.4)
DIFFERENTIAL METHOD: ABNORMAL
EOSINOPHIL # BLD AUTO: 0.3 K/UL (ref 0–0.5)
EOSINOPHIL NFR BLD: 4.7 % (ref 0–8)
ERYTHROCYTE [DISTWIDTH] IN BLOOD BY AUTOMATED COUNT: 14.8 % (ref 11.5–14.5)
EST. GFR  (AFRICAN AMERICAN): >60 ML/MIN/1.73 M^2
EST. GFR  (NON AFRICAN AMERICAN): >60 ML/MIN/1.73 M^2
GLUCOSE SERPL-MCNC: 91 MG/DL (ref 70–110)
HCT VFR BLD AUTO: 45.1 % (ref 37–48.5)
HGB BLD-MCNC: 14.4 G/DL (ref 12–16)
IMM GRANULOCYTES # BLD AUTO: 0.02 K/UL (ref 0–0.04)
IMM GRANULOCYTES NFR BLD AUTO: 0.4 % (ref 0–0.5)
LYMPHOCYTES # BLD AUTO: 1.5 K/UL (ref 1–4.8)
LYMPHOCYTES NFR BLD: 26.6 % (ref 18–48)
MCH RBC QN AUTO: 30.4 PG (ref 27–31)
MCHC RBC AUTO-ENTMCNC: 31.9 G/DL (ref 32–36)
MCV RBC AUTO: 95 FL (ref 82–98)
MONOCYTES # BLD AUTO: 0.6 K/UL (ref 0.3–1)
MONOCYTES NFR BLD: 10.9 % (ref 4–15)
NEUTROPHILS # BLD AUTO: 3.2 K/UL (ref 1.8–7.7)
NEUTROPHILS NFR BLD: 56.7 % (ref 38–73)
NRBC BLD-RTO: 0 /100 WBC
PLATELET # BLD AUTO: 161 K/UL (ref 150–350)
PMV BLD AUTO: 13 FL (ref 9.2–12.9)
POTASSIUM SERPL-SCNC: 4.3 MMOL/L (ref 3.5–5.1)
PROT SERPL-MCNC: 7.8 G/DL (ref 6–8.4)
RBC # BLD AUTO: 4.73 M/UL (ref 4–5.4)
SODIUM SERPL-SCNC: 138 MMOL/L (ref 136–145)
WBC # BLD AUTO: 5.71 K/UL (ref 3.9–12.7)

## 2019-06-07 PROCEDURE — 85025 COMPLETE CBC W/AUTO DIFF WBC: CPT

## 2019-06-07 PROCEDURE — 36415 COLL VENOUS BLD VENIPUNCTURE: CPT | Mod: PO

## 2019-06-07 PROCEDURE — 80053 COMPREHEN METABOLIC PANEL: CPT

## 2019-06-10 ENCOUNTER — TELEPHONE (OUTPATIENT)
Dept: INTERNAL MEDICINE | Facility: CLINIC | Age: 69
End: 2019-06-10

## 2019-06-10 ENCOUNTER — OFFICE VISIT (OUTPATIENT)
Dept: INTERNAL MEDICINE | Facility: CLINIC | Age: 69
End: 2019-06-10
Payer: MEDICARE

## 2019-06-10 ENCOUNTER — TELEPHONE (OUTPATIENT)
Dept: FAMILY MEDICINE | Facility: CLINIC | Age: 69
End: 2019-06-10

## 2019-06-10 VITALS
BODY MASS INDEX: 25.8 KG/M2 | HEART RATE: 75 BPM | HEIGHT: 62 IN | OXYGEN SATURATION: 98 % | SYSTOLIC BLOOD PRESSURE: 156 MMHG | WEIGHT: 140.19 LBS | DIASTOLIC BLOOD PRESSURE: 76 MMHG

## 2019-06-10 DIAGNOSIS — F17.200 TOBACCO DEPENDENCE: ICD-10-CM

## 2019-06-10 DIAGNOSIS — I10 ESSENTIAL HYPERTENSION: Primary | ICD-10-CM

## 2019-06-10 DIAGNOSIS — Z89.511 HX OF BKA, RIGHT: ICD-10-CM

## 2019-06-10 DIAGNOSIS — M54.2 CHRONIC NECK PAIN: ICD-10-CM

## 2019-06-10 DIAGNOSIS — D69.6 THROMBOCYTOPENIA: ICD-10-CM

## 2019-06-10 DIAGNOSIS — G89.29 CHRONIC NECK PAIN: ICD-10-CM

## 2019-06-10 PROCEDURE — 3077F SYST BP >= 140 MM HG: CPT | Mod: CPTII,S$GLB,, | Performed by: INTERNAL MEDICINE

## 2019-06-10 PROCEDURE — 99999 PR PBB SHADOW E&M-EST. PATIENT-LVL IV: CPT | Mod: PBBFAC,,, | Performed by: INTERNAL MEDICINE

## 2019-06-10 PROCEDURE — 1101F PT FALLS ASSESS-DOCD LE1/YR: CPT | Mod: CPTII,S$GLB,, | Performed by: INTERNAL MEDICINE

## 2019-06-10 PROCEDURE — 99214 OFFICE O/P EST MOD 30 MIN: CPT | Mod: S$GLB,,, | Performed by: INTERNAL MEDICINE

## 2019-06-10 PROCEDURE — 3077F PR MOST RECENT SYSTOLIC BLOOD PRESSURE >= 140 MM HG: ICD-10-PCS | Mod: CPTII,S$GLB,, | Performed by: INTERNAL MEDICINE

## 2019-06-10 PROCEDURE — 99999 PR PBB SHADOW E&M-EST. PATIENT-LVL IV: ICD-10-PCS | Mod: PBBFAC,,, | Performed by: INTERNAL MEDICINE

## 2019-06-10 PROCEDURE — 3078F DIAST BP <80 MM HG: CPT | Mod: CPTII,S$GLB,, | Performed by: INTERNAL MEDICINE

## 2019-06-10 PROCEDURE — 99214 PR OFFICE/OUTPT VISIT, EST, LEVL IV, 30-39 MIN: ICD-10-PCS | Mod: S$GLB,,, | Performed by: INTERNAL MEDICINE

## 2019-06-10 PROCEDURE — 1101F PR PT FALLS ASSESS DOC 0-1 FALLS W/OUT INJ PAST YR: ICD-10-PCS | Mod: CPTII,S$GLB,, | Performed by: INTERNAL MEDICINE

## 2019-06-10 PROCEDURE — 3078F PR MOST RECENT DIASTOLIC BLOOD PRESSURE < 80 MM HG: ICD-10-PCS | Mod: CPTII,S$GLB,, | Performed by: INTERNAL MEDICINE

## 2019-06-10 RX ORDER — CYCLOBENZAPRINE HCL 10 MG
10 TABLET ORAL 2 TIMES DAILY PRN
Qty: 30 TABLET | Refills: 0 | Status: SHIPPED | OUTPATIENT
Start: 2019-06-10 | End: 2019-06-20

## 2019-06-10 RX ORDER — TRAMADOL HYDROCHLORIDE 50 MG/1
50 TABLET ORAL 3 TIMES DAILY PRN
Qty: 21 TABLET | Refills: 0 | Status: SHIPPED | OUTPATIENT
Start: 2019-06-10 | End: 2019-06-17

## 2019-06-10 NOTE — TELEPHONE ENCOUNTER
Called and spoke w/ Cristiana at [facility], per Dr. Ferro, patient has a chronic condition.        ----- Message from Evy Mobley sent at 6/10/2019  2:11 PM CDT -----  Contact: [phone]/self  Type:  Pharmacy Calling to Clarify an RX    Name of Caller:  Pharmacy Name:[facility] [address] [place] [address]  Prescription Name:traMADol (ULTRAM) 50 mg tablet  What do they need to clarify?: is it for chronic or acute pain?  Best Call Back Number:  Additional Information:

## 2019-06-10 NOTE — PROGRESS NOTES
Pt. ID: Roma Dunbar is a 68 y.o. female      Chief complaint:   Chief Complaint   Patient presents with    Follow-up       HPI: pt. Here for f/u for HTN; she is taking metoprolal BID and losartan to BID dosing; she takes norvasc QPM; BP is elevated; she states she has L neck and trapezius pain for past 2 months on and off; cervical xray showed degenerative changes; osteophytes were also noted; she would like spine clinic referral; she states that may be why BP is elevated; she takes ibuprofen which does not help much she ran out of robaxin; pain is 9/10 and ibuprofen does not help; she smokes PP 2 days and has not f/u smoking cessation; I reviewed labs dated 6/7/19; platelets have normalized; of note, she requests replacement of her R BKA prosthesis and would like me to complete order      Review of Systems   Constitutional: Negative for chills and fever.   Respiratory: Negative for cough and shortness of breath.    Cardiovascular: Negative for chest pain.   Gastrointestinal: Negative for abdominal pain, nausea and vomiting.   Genitourinary: Negative for dysuria.   Musculoskeletal: Positive for myalgias and neck pain.        R BKA with prosthesis; L posterior base of neck pain traveling along L trapezius area; no L shoulder joint involvement noted         Objective:    Physical Exam   Constitutional: She is oriented to person, place, and time.   Eyes: EOM are normal.   Neck: Normal range of motion.   Cardiovascular: Normal rate, regular rhythm and normal heart sounds.   Pulmonary/Chest: Effort normal and breath sounds normal. No respiratory distress. She has no wheezes. She has no rales.   Abdominal: Soft. There is no tenderness. There is no rebound and no guarding.   Musculoskeletal:   R BKA with prosthesis; L posterior base of neck pain radiating along L trapezius region with ROM; tenderness noted from L posterior base of neck along l trapezius; good ROM of L shoulder joint without pain   Neurological: She is  alert and oriented to person, place, and time.   Skin: No rash noted.   Vitals reviewed.        Health Maintenance   Topic Date Due    LDCT Lung Screen  01/28/2016    Influenza Vaccine  11/21/2019 (Originally 8/1/2019)    Colonoscopy  03/16/2020    Mammogram  04/18/2020    High Dose Statin  05/20/2020    Aspirin/Antiplatelet Therapy  05/20/2020    DEXA SCAN  04/02/2021    Lipid Panel  05/15/2024    TETANUS VACCINE  02/16/2028    Hepatitis C Screening  Completed    Pneumococcal Vaccine (65+ Low/Medium Risk)  Completed         Assessment:     1. Essential hypertension Sub-optimally controlled   2. Chronic neck pain Active   3. Thrombocytopenia Well controlled   4. Hx of BKA, right Active   5. Tobacco dependence Sub-optimally controlled         Plan: Essential hypertension  Comments:  continue current regimen and encouraged low na diet and repeat BP on 1 month f/u with better pain control     Chronic neck pain  Comments:  start tramadol prn for better pain control and change robaxin to flexeril prn; cautioned on sedative effects; refer to pain management and re-evaluate in 1 awa  Orders:  -     cyclobenzaprine (FLEXERIL) 10 MG tablet; Take 1 tablet (10 mg total) by mouth 2 (two) times daily as needed for Muscle spasms (caution on sedative effects).  Dispense: 30 tablet; Refill: 0  -     traMADol (ULTRAM) 50 mg tablet; Take 1 tablet (50 mg total) by mouth 3 (three) times daily as needed for Pain.  Dispense: 21 tablet; Refill: 0  -     Ambulatory referral to Pain Clinic    Thrombocytopenia  Comments:  normalized; monitor     Hx of BKA, right  Comments:  prosthesis order form completed today     Tobacco dependence  Comments:  encouraged cessation and explained risks         Problem List Items Addressed This Visit        Cardiac/Vascular    Essential hypertension - Primary       Hematology    Thrombocytopenia       Orthopedic    Hx of BKA, right    Chronic neck pain    Relevant Medications    cyclobenzaprine  (FLEXERIL) 10 MG tablet    traMADol (ULTRAM) 50 mg tablet    Other Relevant Orders    Ambulatory referral to Pain Clinic       Other    Tobacco dependence

## 2019-06-17 ENCOUNTER — TELEPHONE (OUTPATIENT)
Dept: INTERNAL MEDICINE | Facility: CLINIC | Age: 69
End: 2019-06-17

## 2019-06-17 NOTE — TELEPHONE ENCOUNTER
Patient left a notebook in the exam room. Called patient on 6/10/2019 and 6/17/2019, call could not be completed as dialed. Called emergency contact daughter Kanchan Dunbar on 6/10/19 and 6/17/19, call can not be completed at this time.

## 2019-06-18 ENCOUNTER — TELEPHONE (OUTPATIENT)
Dept: SMOKING CESSATION | Facility: CLINIC | Age: 69
End: 2019-06-18

## 2019-06-18 ENCOUNTER — TELEPHONE (OUTPATIENT)
Dept: FAMILY MEDICINE | Facility: CLINIC | Age: 69
End: 2019-06-18

## 2019-06-18 NOTE — TELEPHONE ENCOUNTER
1st attempt unable to leave a message regarding smoking cessation quit 1 episode. Call can not be completed.

## 2019-06-21 ENCOUNTER — TELEPHONE (OUTPATIENT)
Dept: INTERNAL MEDICINE | Facility: CLINIC | Age: 69
End: 2019-06-21

## 2019-06-21 NOTE — PROGRESS NOTES
[facility] New Patient Evaluation    Referred by: Rolly Ferro MD   Reason for referral: Chronic neck pain     CC:   Chief Complaint   Patient presents with    Neck Pain     Last 3 PDI Scores 6/24/2019 1/17/2017   Pain Disability Index (PDI) 34 45       HPI:   Roma Dunbar is a 68 y.o. female who complains of chronic bilateral neck pain that has become increasingly severe, impacting her ability to sleep at night, over the past month.  Her pain is primarily left-sided, throbbing, aching, and burning.  She denies any shooting sensations into the upper extremities. She notes increasing difficulty with lateral rotation and with extension for looking up toward the ceiling.  Pain is particularly bothersome at night.  She reports waking usually between 1 and 2:00 a.m. because of increasing pain in the neck.    Location: Neck   Onset: 1 month  Current Pain Score: 10/10  Daily Pain of Range: 10-10/10  Quality: Burning, Throbbing, Grabbing, Tight, Tingling, Deep, Numb, Sharp, Hot and Cold  Radiation: up into head  Worsened by: lying down, sitting, standing and walking for more than 10 minutes  Improved by: medications    Previous Therapies:  PT/OT: Denies  HEP: Patient endorses very sedentary lifestyle  Interventions: Denies  Surgery: Denies cervical neck surgery  Medications:   - NSAIDS:   - MSK Relaxants:   - TCAs:   - SNRIs:   - Topicals:   - Anticonvulsants:  - Opioids:     Current Pain Medications:  1. Ibuprofen     Review of Systems:  Review of Systems   Constitutional: Negative for chills and fever.   HENT: Negative for nosebleeds.    Eyes: Negative for blurred vision and pain.   Respiratory: Negative for hemoptysis.    Cardiovascular: Negative for chest pain and palpitations.   Gastrointestinal: Negative for heartburn, nausea and vomiting.   Genitourinary: Negative for dysuria and hematuria.   Musculoskeletal: Positive for myalgias and neck pain.   Skin: Negative for rash.   Neurological: Negative for  tingling, seizures, loss of consciousness and headaches.   Endo/Heme/Allergies: Does not bruise/bleed easily.   Psychiatric/Behavioral: Negative for hallucinations.       History:    Current Outpatient Medications:     aspirin 325 MG tablet, Take 325 mg by mouth once daily., Disp: , Rfl:     atorvastatin (LIPITOR) 10 MG tablet, Take 1 tablet (10 mg total) by mouth once daily., Disp: 90 tablet, Rfl: 3    cilostazol (PLETAL) 50 MG Tab, TAKE 1 TABLET BY MOUTH TWICE DAILY, Disp: 90 tablet, Rfl: 0    clopidogrel (PLAVIX) 75 mg tablet, Take 1 tablet (75 mg total) by mouth once daily., Disp: 30 tablet, Rfl: 11    losartan (COZAAR) 50 MG tablet, Take 1 tablet (50 mg total) by mouth 2 (two) times daily., Disp: 180 tablet, Rfl: 0    metoprolol succinate (TOPROL-XL) 25 MG 24 hr tablet, Take 1 tablet (25 mg total) by mouth 2 (two) times daily., Disp: 180 tablet, Rfl: 0    nicotine (NICODERM CQ) 21 mg/24 hr, Place 1 patch onto the skin once daily., Disp: 14 patch, Rfl: 0    nicotine polacrilex 2 MG Lozg, Take 1 lozenge (2 mg total) by mouth as needed (Use in place of cigarettes)., Disp: 108 lozenge, Rfl: 0    amLODIPine (NORVASC) 10 MG tablet, Take 1 tablet (10 mg total) by mouth every evening., Disp: 90 tablet, Rfl: 0    ibuprofen (ADVIL,MOTRIN) 600 MG tablet, Take 1 tablet (600 mg total) by mouth every 6 (six) hours as needed for Pain., Disp: 20 tablet, Rfl: 0    Past Medical History:   Diagnosis Date    Coronary artery disease     GERD (gastroesophageal reflux disease)     Herpes simplex without mention of complication     Hyperlipidemia     Hypertension     PAD (peripheral artery disease)        Past Surgical History:   Procedure Laterality Date    BKA   mid 2012    Right side at shin level amputation. 2/2 PVD    CARDIAC SURGERY      CORONARY ARTERY BYPASS GRAFT      before [place]    ESOPHAGOGASTRODUODENOSCOPY (EGD) N/A 8/21/2017    Performed by Robert Bautista [facility] at [facility]    HYSTERECTOMY    "      Family History   Problem Relation Age of Onset    Hypertension Mother     Diabetes Mother     Colon cancer Maternal Grandmother        Social History     Socioeconomic History    Marital status: Single     Spouse name: Not on file    Number of children: Not on file    Years of education: Not on file    Highest education level: Not on file   Occupational History    Not on file   Social Needs    Financial resource strain: Not on file    Food insecurity:     Worry: Not on file     Inability: Not on file    Transportation needs:     Medical: Not on file     Non-medical: Not on file   Tobacco Use    Smoking status: Current Some Day Smoker     Packs/day: 1.50     Years: 40.00     Pack years: 60.00     Types: Cigarettes    Smokeless tobacco: Never Used   Substance and Sexual Activity    Alcohol use: Yes     Comment: beer on weekends    Drug use: No    Sexual activity: Not on file   Lifestyle    Physical activity:     Days per week: Not on file     Minutes per session: Not on file    Stress: Not on file   Relationships    Social connections:     Talks on phone: Not on file     Gets together: Not on file     Attends Anabaptism service: Not on file     Active member of club or organization: Not on file     Attends meetings of clubs or organizations: Not on file     Relationship status: Not on file   Other Topics Concern    Not on file   Social History Narrative    Not on file       Review of patient's allergies indicates:  No Known Allergies    Physical Exam:  Vitals:    06/24/19 1024   BP: (!) 185/80   Pulse: 66   Weight: 63.5 kg (140 lb)   Height: 5' 2" (1.575 m)   PainSc: 10-Worst pain ever     General    Nursing note and vitals reviewed.  Constitutional: She is oriented to person, place, and time. She appears well-developed and well-nourished. No distress.   HENT:   Head: Normocephalic and atraumatic.   Nose: Nose normal.   Eyes: Conjunctivae and EOM are normal. Pupils are equal, round, and " reactive to light. Right eye exhibits no discharge. Left eye exhibits no discharge. No scleral icterus.   Neck: No JVD present.   Cardiovascular: Intact distal pulses.    Pulmonary/Chest: Effort normal. No respiratory distress.   Abdominal: She exhibits no distension.   Neurological: She is alert and oriented to person, place, and time. Coordination normal.   Psychiatric: She has a normal mood and affect. Her behavior is normal. Judgment and thought content normal.     General Musculoskeletal Exam   Gait: normal     Back (L-Spine & T-Spine) / Neck (C-Spine) Exam     Tenderness Posterior midline palpation reveals tenderness of the Upper C-Spine and Lower C-Spine. Right paramedian tenderness of the Lower C-Spine and Upper C-Spine. Left paramedian tenderness of the Upper C-Spine and Lower C-Spine.     Back (L-Spine & T-Spine) Range of Motion   Extension: abnormal   Flexion: abnormal     Neck (C-Spine) Range of Motion   Flexion:     Limited  Extension: Limited  Right Lateral Bend: abnormal  Left Lateral Bend: abnormal  Right Rotation: abnormal  Left Rotation: abnormal    Spinal Sensation   Right Side Sensation  C-Spine Level: normal   Left Side Sensation  C-Spine Level: normal    Neck (C-Spine) Tests   Spurling's Test   Left:  Negative  Right: negative    Other She has no scoliosis .      Muscle Strength   Right Upper Extremity   Biceps: 5/5/5   Deltoid:  5/5  Triceps:  5/5  Wrist extension: 5/5/5   Wrist flexion: 5/5/5   Finger Flexors:  5/5  Finger Extensors:  5/5  Left Upper Extremity  Biceps: 5/5/5   Deltoid:  5/5  Triceps:  5/5  Wrist extension: 5/5/5   Wrist flexion: 5/5/5   Finger Flexors:  5/5  Finger Extensors:  5/5  Right Lower Extremity   Hip Flexion: 5/5   Hip Extensors: 5/5  Quadriceps:  5/5   Hamstrin/5   Gastrocsoleus:  5/5/5  Left Lower Extremity   Hip Flexion: 5/5   Hip Extensors: 5/5  Quadriceps:  5/5   Hamstrin/5   Gastrocsoleus:  5/5/5    Reflexes     Left Side  Biceps:  2+    Right Side    Biceps:  2+      Imaging:  X-Ray Cervical Spine AP And Lateral   Details     Reading Physician Reading Date Result Priority   Eduin Baron MD 4/2/2019       Narrative     EXAMINATION:  XR CERVICAL SPINE AP LATERAL    CLINICAL HISTORY:  neck pain;    TECHNIQUE:  AP, lateral and open mouth views of the cervical spine were performed.    COMPARISON:  None.    FINDINGS:  Three views.    Lateral imaging demonstrates adequate alignment of the cervical spine without significant vertebral body height loss.  There is disc space height loss and disc degenerative disease primarily involving C5-C6.  There is multilevel marginal anterior osteophytosis.  The facet joints are aligned.  AP spinal alignment is grossly unremarkable allowing for motion blurring.  The visualized lung apices are grossly clear.  Sternotomy noted.  The odontoid appears grossly intact.  The lateral masses of C1 are in anatomic position as related to C2.  The paraspinous and hypopharyngeal soft tissues are grossly unremarkable.  The airway is patent.      Impression       1. No acute displaced fracture or dislocation of the cervical spine noting degenerative changes.               Labs:  BMP  Lab Results   Component Value Date     06/07/2019    K 4.3 06/07/2019     06/07/2019    CO2 20 (L) 06/07/2019    BUN 14 06/07/2019    CREATININE 0.8 06/07/2019    CALCIUM 10.1 06/07/2019    ANIONGAP 11 06/07/2019    ESTGFRAFRICA >60.0 06/07/2019    EGFRNONAA >60.0 06/07/2019     Lab Results   Component Value Date    ALT 13 06/07/2019    AST 18 06/07/2019    ALKPHOS 85 06/07/2019    BILITOT 0.7 06/07/2019       Assessment:  Roma Dunbar is a 68 y.o. female with the following diagnoses based on history, exam, and imaging:    Problem List Items Addressed This Visit     Insomnia secondary to chronic pain    Relevant Medications    tiZANidine (ZANAFLEX) 4 MG tablet    Other Relevant Orders    Ambulatory Referral to Physical/Occupational Therapy     Cervical myofascial pain syndrome - Primary    Relevant Medications    tiZANidine (ZANAFLEX) 4 MG tablet    Other Relevant Orders    Ambulatory Referral to Physical/Occupational Therapy    Spondylosis of cervical region without myelopathy or radiculopathy    Relevant Medications    tiZANidine (ZANAFLEX) 4 MG tablet    Other Relevant Orders    Ambulatory Referral to Physical/Occupational Therapy          : Reviewed and consistent with medication use as prescribed.    Treatment Plan:   Procedures: We will consider interventions after completion of PT.  I will likely started with TPIs and consider MBBs.  PT/OT/HEP: Referral placed for neck PT.  Medications:    - Start trial of Tizanidine 4 mg QHS fr muscle relaxation and insomnia due to myofascial pain.  Labs: reviewed and medications are appropriately dosed for current hepatorenal function.  Imaging: No additional recommended at this time.    Follow Up: RTC 4 weeks or sooner if needed    Anish Estes Jr, MD  Interventional Pain Medicine / Anesthesiology    Disclaimer: This note was partly generated using dictation software which may occasionally result in transcription errors.

## 2019-06-21 NOTE — TELEPHONE ENCOUNTER
Called patient's daughter Josefa, she gave me a new # to call her mother, called patient, no answer, left VM to call office.

## 2019-06-24 ENCOUNTER — OFFICE VISIT (OUTPATIENT)
Dept: PAIN MEDICINE | Facility: CLINIC | Age: 69
End: 2019-06-24
Payer: MEDICARE

## 2019-06-24 VITALS
DIASTOLIC BLOOD PRESSURE: 80 MMHG | WEIGHT: 140 LBS | SYSTOLIC BLOOD PRESSURE: 185 MMHG | BODY MASS INDEX: 25.76 KG/M2 | HEIGHT: 62 IN | HEART RATE: 66 BPM

## 2019-06-24 DIAGNOSIS — G47.01 INSOMNIA SECONDARY TO CHRONIC PAIN: ICD-10-CM

## 2019-06-24 DIAGNOSIS — M79.18 CERVICAL MYOFASCIAL PAIN SYNDROME: Primary | ICD-10-CM

## 2019-06-24 DIAGNOSIS — M47.812 SPONDYLOSIS OF CERVICAL REGION WITHOUT MYELOPATHY OR RADICULOPATHY: ICD-10-CM

## 2019-06-24 DIAGNOSIS — G89.29 INSOMNIA SECONDARY TO CHRONIC PAIN: ICD-10-CM

## 2019-06-24 PROCEDURE — 99999 PR PBB SHADOW E&M-EST. PATIENT-LVL III: CPT | Mod: PBBFAC,,, | Performed by: PAIN MEDICINE

## 2019-06-24 PROCEDURE — 3077F PR MOST RECENT SYSTOLIC BLOOD PRESSURE >= 140 MM HG: ICD-10-PCS | Mod: CPTII,S$GLB,, | Performed by: PAIN MEDICINE

## 2019-06-24 PROCEDURE — 3077F SYST BP >= 140 MM HG: CPT | Mod: CPTII,S$GLB,, | Performed by: PAIN MEDICINE

## 2019-06-24 PROCEDURE — 99999 PR PBB SHADOW E&M-EST. PATIENT-LVL III: ICD-10-PCS | Mod: PBBFAC,,, | Performed by: PAIN MEDICINE

## 2019-06-24 PROCEDURE — 3079F DIAST BP 80-89 MM HG: CPT | Mod: CPTII,S$GLB,, | Performed by: PAIN MEDICINE

## 2019-06-24 PROCEDURE — 99214 PR OFFICE/OUTPT VISIT, EST, LEVL IV, 30-39 MIN: ICD-10-PCS | Mod: S$GLB,,, | Performed by: PAIN MEDICINE

## 2019-06-24 PROCEDURE — 3079F PR MOST RECENT DIASTOLIC BLOOD PRESSURE 80-89 MM HG: ICD-10-PCS | Mod: CPTII,S$GLB,, | Performed by: PAIN MEDICINE

## 2019-06-24 PROCEDURE — 1101F PT FALLS ASSESS-DOCD LE1/YR: CPT | Mod: CPTII,S$GLB,, | Performed by: PAIN MEDICINE

## 2019-06-24 PROCEDURE — 99214 OFFICE O/P EST MOD 30 MIN: CPT | Mod: S$GLB,,, | Performed by: PAIN MEDICINE

## 2019-06-24 PROCEDURE — 1101F PR PT FALLS ASSESS DOC 0-1 FALLS W/OUT INJ PAST YR: ICD-10-PCS | Mod: CPTII,S$GLB,, | Performed by: PAIN MEDICINE

## 2019-06-24 RX ORDER — TIZANIDINE 4 MG/1
4 TABLET ORAL NIGHTLY PRN
Qty: 30 TABLET | Refills: 0 | Status: SHIPPED | OUTPATIENT
Start: 2019-06-24

## 2019-06-24 NOTE — LETTER
June 24, 2019      Rolly Ferro MD  [address] [facility]  [place] [id number]           [facility]  [address] [place] [address] [address]  [place] [address]  Phone: [phone]          Patient: Roma Dunbar   MR Number: [id number]   YOB: 1950   Date of Visit: 6/24/2019       Dear Dr. Rolly Ferro:    Thank you for referring Roma Dunbar to me for evaluation. Attached you will find relevant portions of my assessment and plan of care.    If you have questions, please do not hesitate to call me. I look forward to following Roma Dunbar along with you.    Sincerely,    Anish Estes Jr., MD    Enclosure  CC:  No Recipients    If you would like to receive this communication electronically, please contact [email or web address] or [phone] to request more information on "[employer]" Link access.    For providers and/or their staff who would like to refer a patient to [facility], please contact us through our one-stop-shop provider referral line, [facility], at [phone].    If you feel you have received this communication in error or would no longer like to receive these types of communications, please e-mail [email or web address]

## 2019-06-25 ENCOUNTER — CLINICAL SUPPORT (OUTPATIENT)
Dept: SMOKING CESSATION | Facility: CLINIC | Age: 69
End: 2019-06-25
Payer: COMMERCIAL

## 2019-06-25 ENCOUNTER — TELEPHONE (OUTPATIENT)
Dept: INTERNAL MEDICINE | Facility: CLINIC | Age: 69
End: 2019-06-25

## 2019-06-25 DIAGNOSIS — F17.200 NICOTINE DEPENDENCE: Primary | ICD-10-CM

## 2019-06-25 PROCEDURE — 99407 PR TOBACCO USE CESSATION INTENSIVE >10 MINUTES: ICD-10-PCS | Mod: S$GLB,,, | Performed by: INTERNAL MEDICINE

## 2019-06-25 PROCEDURE — 99407 BEHAV CHNG SMOKING > 10 MIN: CPT | Mod: S$GLB,,, | Performed by: INTERNAL MEDICINE

## 2019-06-25 NOTE — PROGRESS NOTES
Spoke with patient today in regard to smoking cessation progress for 12 month telephone follow up, she states not tobacco free. Patient states smoking about 1 ppd of cigarettes at this time. Patient has scheduled an appointment to return to the program for Quit attempt #2 on 7/8/2019 @ 8:00 am. Informed patient of benefit period, future follow ups, and contact information if any further help or support is needed. Will resolve episode and complete smart form for Quit attempt #1.

## 2019-06-25 NOTE — TELEPHONE ENCOUNTER
Called and spoke w/ patient. Patient left a pink notebook at her last office visit. She will pass by and pick it up when she has a chance.

## 2019-07-09 ENCOUNTER — TELEPHONE (OUTPATIENT)
Dept: SMOKING CESSATION | Facility: CLINIC | Age: 69
End: 2019-07-09

## 2019-07-09 NOTE — TELEPHONE ENCOUNTER
Called to reschedule a missed appointment but no answer or answering machine. Will try again later.

## 2019-07-16 ENCOUNTER — TELEPHONE (OUTPATIENT)
Dept: SMOKING CESSATION | Facility: CLINIC | Age: 69
End: 2019-07-16

## 2019-07-16 NOTE — TELEPHONE ENCOUNTER
Left a message regarding a missed appointment and gave my number to call and reschedule. [phone].

## 2019-07-24 ENCOUNTER — TELEPHONE (OUTPATIENT)
Dept: SMOKING CESSATION | Facility: CLINIC | Age: 69
End: 2019-07-24

## 2019-08-26 DIAGNOSIS — I10 ESSENTIAL HYPERTENSION: ICD-10-CM

## 2019-08-26 RX ORDER — LOSARTAN POTASSIUM 50 MG/1
TABLET ORAL
Qty: 180 TABLET | Refills: 0 | Status: SHIPPED | OUTPATIENT
Start: 2019-08-26 | End: 2019-10-07 | Stop reason: SDUPTHER

## 2019-08-28 RX ORDER — ATORVASTATIN CALCIUM 10 MG/1
TABLET, FILM COATED ORAL
Qty: 90 TABLET | Refills: 0 | Status: SHIPPED | OUTPATIENT
Start: 2019-08-28 | End: 2019-09-06 | Stop reason: SDUPTHER

## 2019-09-05 RX ORDER — ATORVASTATIN CALCIUM 10 MG/1
TABLET, FILM COATED ORAL
Qty: 90 TABLET | Refills: 0 | Status: SHIPPED | OUTPATIENT
Start: 2019-09-05

## 2019-09-06 ENCOUNTER — OFFICE VISIT (OUTPATIENT)
Dept: INTERNAL MEDICINE | Facility: CLINIC | Age: 69
End: 2019-09-06
Payer: MEDICARE

## 2019-09-06 VITALS
BODY MASS INDEX: 25.8 KG/M2 | DIASTOLIC BLOOD PRESSURE: 72 MMHG | HEART RATE: 70 BPM | OXYGEN SATURATION: 98 % | SYSTOLIC BLOOD PRESSURE: 174 MMHG | HEIGHT: 62 IN | WEIGHT: 140.19 LBS

## 2019-09-06 DIAGNOSIS — Z89.511 HX OF BKA, RIGHT: ICD-10-CM

## 2019-09-06 DIAGNOSIS — Z12.9 SCREENING FOR CANCER: ICD-10-CM

## 2019-09-06 DIAGNOSIS — F17.200 TOBACCO DEPENDENCE: ICD-10-CM

## 2019-09-06 DIAGNOSIS — D69.6 THROMBOCYTOPENIA: ICD-10-CM

## 2019-09-06 DIAGNOSIS — I10 ESSENTIAL HYPERTENSION: Primary | ICD-10-CM

## 2019-09-06 DIAGNOSIS — M54.2 CHRONIC NECK PAIN: ICD-10-CM

## 2019-09-06 DIAGNOSIS — I73.9 PAD (PERIPHERAL ARTERY DISEASE): ICD-10-CM

## 2019-09-06 DIAGNOSIS — G89.29 CHRONIC NECK PAIN: ICD-10-CM

## 2019-09-06 PROCEDURE — 3078F DIAST BP <80 MM HG: CPT | Mod: CPTII,S$GLB,, | Performed by: INTERNAL MEDICINE

## 2019-09-06 PROCEDURE — 1101F PR PT FALLS ASSESS DOC 0-1 FALLS W/OUT INJ PAST YR: ICD-10-PCS | Mod: CPTII,S$GLB,, | Performed by: INTERNAL MEDICINE

## 2019-09-06 PROCEDURE — 3077F SYST BP >= 140 MM HG: CPT | Mod: CPTII,S$GLB,, | Performed by: INTERNAL MEDICINE

## 2019-09-06 PROCEDURE — 99999 PR PBB SHADOW E&M-EST. PATIENT-LVL IV: CPT | Mod: PBBFAC,,, | Performed by: INTERNAL MEDICINE

## 2019-09-06 PROCEDURE — 99214 OFFICE O/P EST MOD 30 MIN: CPT | Mod: S$GLB,,, | Performed by: INTERNAL MEDICINE

## 2019-09-06 PROCEDURE — 1101F PT FALLS ASSESS-DOCD LE1/YR: CPT | Mod: CPTII,S$GLB,, | Performed by: INTERNAL MEDICINE

## 2019-09-06 PROCEDURE — 3078F PR MOST RECENT DIASTOLIC BLOOD PRESSURE < 80 MM HG: ICD-10-PCS | Mod: CPTII,S$GLB,, | Performed by: INTERNAL MEDICINE

## 2019-09-06 PROCEDURE — 99214 PR OFFICE/OUTPT VISIT, EST, LEVL IV, 30-39 MIN: ICD-10-PCS | Mod: S$GLB,,, | Performed by: INTERNAL MEDICINE

## 2019-09-06 PROCEDURE — 99999 PR PBB SHADOW E&M-EST. PATIENT-LVL IV: ICD-10-PCS | Mod: PBBFAC,,, | Performed by: INTERNAL MEDICINE

## 2019-09-06 PROCEDURE — 3077F PR MOST RECENT SYSTOLIC BLOOD PRESSURE >= 140 MM HG: ICD-10-PCS | Mod: CPTII,S$GLB,, | Performed by: INTERNAL MEDICINE

## 2019-09-06 RX ORDER — METOPROLOL SUCCINATE 50 MG/1
50 TABLET, EXTENDED RELEASE ORAL 2 TIMES DAILY
Qty: 180 TABLET | Refills: 0 | Status: SHIPPED | OUTPATIENT
Start: 2019-09-06 | End: 2020-09-05

## 2019-09-06 RX ORDER — METOPROLOL SUCCINATE 50 MG/1
50 TABLET, EXTENDED RELEASE ORAL 2 TIMES DAILY
Qty: 180 TABLET | Refills: 0 | Status: SHIPPED | OUTPATIENT
Start: 2019-09-06 | End: 2019-09-06

## 2019-09-06 NOTE — PROGRESS NOTES
Pt. ID: Roma Dunbar is a 69 y.o. female      Chief complaint:   Chief Complaint   Patient presents with    Leg Pain       HPI: pt. Here for f/u for HTN; she is compliant with meds and BP is elevatedarterial US dated 6/5/18 confirms B/L PAD for which pt. Sees cardiology/vascular and is on ASA/Plavix/statin and cilostazol; she continues to have claudication and she also sees vascular and I advised her to f/u;  cervical xray dated 4/2/19 shows degenerative changes; she f/u pain management and was placed on zanaflex and was referred to PT; she did not f/u PT and I advised her to do so; she will get refill of zanaflex from pain management; she smokes 1 PPD and she has not f/u smoking cessation; I advised her to do so; I reviewed labs dated 6/7/19; platelets have normalized; HR is 70 and I will increase toprol dose; she would like LDCT and has hx of smoking > 30 years; she reports allergies and I asked her to try claritin OTC      Review of Systems   Constitutional: Negative for chills and fever.   HENT: Positive for congestion.         Allergies: asked pt. To try claritin prn    Respiratory: Negative for cough and shortness of breath.    Cardiovascular: Positive for claudication. Negative for chest pain.   Gastrointestinal: Negative for abdominal pain, nausea and vomiting.   Genitourinary: Negative for dysuria.   Musculoskeletal: Positive for neck pain.        Posterior neck pain with ROM         Objective:    Physical Exam   Constitutional: She is oriented to person, place, and time.   Eyes: EOM are normal.   Neck: Normal range of motion.   Cardiovascular: Normal rate, regular rhythm and normal heart sounds.   Pulmonary/Chest: Effort normal and breath sounds normal. No respiratory distress. She has no wheezes. She has no rales.   Abdominal: Soft. There is no tenderness. There is no rebound and no guarding.   Musculoskeletal: Normal range of motion.   R BKA; posterior neck pain with ROM; gait instability: uses a cane     Neurological: She is alert and oriented to person, place, and time.   Skin: No rash noted.         Health Maintenance   Topic Date Due    LDCT Lung Screen  11/05/2019 (Originally 1/28/2016)    Colonoscopy  03/16/2020    Mammogram  04/18/2020    Aspirin/Antiplatelet Therapy  06/24/2020    DEXA SCAN  04/02/2021    Lipid Panel  05/15/2024    TETANUS VACCINE  02/16/2028    Hepatitis C Screening  Completed    Pneumococcal Vaccine (65+ Low/Medium Risk)  Completed         Assessment:     1. Essential hypertension Poorly controlled   2. PAD (peripheral artery disease) Active   3. Chronic neck pain Active   4. Thrombocytopenia Well controlled   5. Hx of BKA, right Active   6. Tobacco dependence Sub-optimally controlled   7. Screening for cancer Active         Plan: Essential hypertension  Comments:  continue current regimen and increase toprol to 50 mg BID; encouraged l;ow Na diet and weight loss; repeat BP on f/u in 2 weeks   Orders:  -     Discontinue: metoprolol succinate (TOPROL-XL) 50 MG 24 hr tablet; Take 1 tablet (50 mg total) by mouth 2 (two) times daily.  Dispense: 180 tablet; Refill: 0  -     metoprolol succinate (TOPROL-XL) 50 MG 24 hr tablet; Take 1 tablet (50 mg total) by mouth 2 (two) times daily.  Dispense: 180 tablet; Refill: 0    PAD (peripheral artery disease)  Comments:  f/u cardiology/vascular who is managing     Chronic neck pain  Comments:  tyelenol prn and asked pt. to f/u pain management for stronger pain med or zanaflex; asked pt. to schedule PT     Thrombocytopenia  Comments:  normalized; monitor     Hx of BKA, right    Tobacco dependence  Comments:  encouraged cessation and explained risks     Screening for cancer  -     CT Chest Lung Screening Low Dose; Future; Expected date: 09/06/2019        Problem List Items Addressed This Visit        Cardiac/Vascular    Essential hypertension - Primary    Relevant Medications    metoprolol succinate (TOPROL-XL) 50 MG 24 hr tablet    PAD  (peripheral artery disease)       Hematology    Thrombocytopenia       Oncology    Screening for cancer    Relevant Orders    CT Chest Lung Screening Low Dose       Orthopedic    Hx of BKA, right    Chronic neck pain       Other    Tobacco dependence

## 2019-09-19 ENCOUNTER — OFFICE VISIT (OUTPATIENT)
Dept: URGENT CARE | Facility: CLINIC | Age: 69
End: 2019-09-19
Payer: MEDICARE

## 2019-09-19 VITALS
SYSTOLIC BLOOD PRESSURE: 190 MMHG | TEMPERATURE: 98 F | HEIGHT: 62 IN | WEIGHT: 140 LBS | RESPIRATION RATE: 20 BRPM | HEART RATE: 86 BPM | DIASTOLIC BLOOD PRESSURE: 80 MMHG | OXYGEN SATURATION: 99 % | BODY MASS INDEX: 25.76 KG/M2

## 2019-09-19 DIAGNOSIS — T14.8XXA BLISTER: Primary | ICD-10-CM

## 2019-09-19 PROCEDURE — 3077F PR MOST RECENT SYSTOLIC BLOOD PRESSURE >= 140 MM HG: ICD-10-PCS | Mod: CPTII,S$GLB,, | Performed by: NURSE PRACTITIONER

## 2019-09-19 PROCEDURE — 3079F DIAST BP 80-89 MM HG: CPT | Mod: CPTII,S$GLB,, | Performed by: NURSE PRACTITIONER

## 2019-09-19 PROCEDURE — 3079F PR MOST RECENT DIASTOLIC BLOOD PRESSURE 80-89 MM HG: ICD-10-PCS | Mod: CPTII,S$GLB,, | Performed by: NURSE PRACTITIONER

## 2019-09-19 PROCEDURE — 99214 OFFICE O/P EST MOD 30 MIN: CPT | Mod: S$GLB,,, | Performed by: NURSE PRACTITIONER

## 2019-09-19 PROCEDURE — 99214 PR OFFICE/OUTPT VISIT, EST, LEVL IV, 30-39 MIN: ICD-10-PCS | Mod: S$GLB,,, | Performed by: NURSE PRACTITIONER

## 2019-09-19 PROCEDURE — 1101F PR PT FALLS ASSESS DOC 0-1 FALLS W/OUT INJ PAST YR: ICD-10-PCS | Mod: CPTII,S$GLB,, | Performed by: NURSE PRACTITIONER

## 2019-09-19 PROCEDURE — 3077F SYST BP >= 140 MM HG: CPT | Mod: CPTII,S$GLB,, | Performed by: NURSE PRACTITIONER

## 2019-09-19 PROCEDURE — 1101F PT FALLS ASSESS-DOCD LE1/YR: CPT | Mod: CPTII,S$GLB,, | Performed by: NURSE PRACTITIONER

## 2019-09-19 NOTE — PATIENT INSTRUCTIONS
Please be aware your blood pressure was slightly elevated today -  Make sure to take your blood pressure medicines, eat a low salt diet and recheck your blood pressure to make sure it is not getting too elevated ( greater than 160/100).   Advised pt to take bp again when well - if still elevated f/u with pcp.     Please return here or go to the Emergency Department for any concerns or worsening of condition.  Please follow up with your primary care doctor or specialist as needed.  If you  smoke, please stop smoking.    Blister (Adult)  A blister is a raised area of skin with clear, watery fluid inside. A blister can occur when the skin is damaged. Blisters can hurt when they are pressed, or if they break open.  Blisters can be caused in many ways. This can happen if the skin is rubbed too hard or often. Or they can occur if the skin is hurt by the sun, a virus, or even a medicine.  Most blisters need little treatment. They often dry up and go away in a few days to weeks after the cause is stopped. A blister may need to be cleaned. A broken (open) blister may be bandaged to prevent infection. Blisters caused by insect bites or drug reactions may be more serious. These should be looked at by a healthcare provider.  Home care  Your healthcare provider may prescribe pain medicine. He or she may also prescribe an antibiotic cream or ointment to put on an open blister. Follow all instructions when using these medicines.  General care:  · Follow all instructions on how to care for the blister. If a bandage was put on, change the bandage as instructed. .  · If the blister breaks, the area will leak a clear fluid for a day or 2. Wash the area with soap and water every day or as advised by your healthcare provider.  · You may use over-the-counter pain medicines to control pain, unless another medicine was prescribed. If you have chronic liver or kidney disease, talk with your healthcare provider before using these medicines.  Also talk with your provider if you've had a stomach ulcer or gastrointestinal bleeding.  Follow-up care  Follow up with your healthcare provider, or as advised.  When to seek medical advice  Call your healthcare provider right away if any of these occur:  · Fever of 100.4°F (38°C) or higher  · Redness or swelling that is new or gets worse  · Foul-smelling fluid leaking from the blister  · Pain doesnt go away, or gets worse  · Increase in size of the blister  · Blister doesnt get better after several days  Date Last Reviewed: 9/1/2016  © 1207-8873 [employer] [address] [place] [address], [place] [address]. All rights reserved. This information is not intended as a substitute for professional medical care. Always follow your healthcare professional's instructions.

## 2019-09-19 NOTE — PROGRESS NOTES
"Subjective:       Patient ID: Roma Dunbar is a 69 y.o. female.    Vitals:  height is 5' 2" (1.575 m) and weight is 63.5 kg (140 lb). Her oral temperature is 98.1 °F (36.7 °C). Her blood pressure is 190/80 (abnormal) and her pulse is 86. Her respiration is 20 and oxygen saturation is 99%.     Chief Complaint: Wound Infection (right amputated leg)    Patient comes in with c/o possible wound infection at the bottom of her right amputated leg. She states she noticed a small blister that started on Tuesday and has gotten bigger since then. She started using new latex made cover for her knub and blister started after using new cover  Patient's BP is elevated today. Patient did take their  Medication late  Today and worried about the blister on her R BKA.  No headache, Chest pain, or SOB. Patient has been advised to monitor the BP for the next few days.  If it stays elevated, patient should contact their PCP      Wound Check   She was originally treated 2 to 3 days ago. Previous treatment included burn dressing. Her temperature was unmeasured prior to arrival. There has been clear discharge from the wound. The redness has not changed. The swelling has worsened. There is no pain present.       Constitution: Negative for chills and fever.   HENT: Negative for facial swelling and sore throat.    Neck: Negative for painful lymph nodes.   Eyes: Negative for eye itching and eyelid swelling.   Respiratory: Negative for cough.    Musculoskeletal: Negative for joint pain and joint swelling.   Skin: Positive for pale, wound and abrasion. Negative for color change, rash, laceration, lesion, skin thickening/induration, puncture wound, erythema, bruising, abscess, avulsion and hives.   Allergic/Immunologic: Negative for environmental allergies, immunocompromised state and hives.   Hematologic/Lymphatic: Negative for swollen lymph nodes.       Objective:      Physical Exam   Constitutional: She is oriented to person, place, and time. " She appears well-developed and well-nourished.   HENT:   Head: Normocephalic and atraumatic. Head is without abrasion, without contusion and without laceration.   Right Ear: External ear normal.   Left Ear: External ear normal.   Nose: Nose normal.   Mouth/Throat: Oropharynx is clear and moist.   Eyes: Pupils are equal, round, and reactive to light. Conjunctivae, EOM and lids are normal.   Neck: Trachea normal, full passive range of motion without pain and phonation normal. Neck supple.   Cardiovascular: Normal rate, regular rhythm and normal heart sounds.   Pulmonary/Chest: Effort normal and breath sounds normal. No stridor. No respiratory distress.   Musculoskeletal: Normal range of motion.   Neurological: She is alert and oriented to person, place, and time.   Skin: Skin is warm, dry and intact. Capillary refill takes less than 2 seconds. No abrasion, no bruising, no burn, no ecchymosis, no laceration, no lesion and no rash noted. No erythema.   Right BKA, fluid filled blister, clear drainage, no erythema or warmth or swelling, no pain on touch   Psychiatric: She has a normal mood and affect. Her speech is normal and behavior is normal. Judgment and thought content normal. Cognition and memory are normal.   Nursing note and vitals reviewed.      Assessment:       1. Blister        Plan:       Patient will follow up with wound care for further evaluation. Appointment on Monday for R BKA follow up.  Patient voiced understanding and in agreement with current treatment plan.    Blister      Patient Instructions   Please be aware your blood pressure was slightly elevated today -  Make sure to take your blood pressure medicines, eat a low salt diet and recheck your blood pressure to make sure it is not getting too elevated ( greater than 160/100).   Advised pt to take bp again when well - if still elevated f/u with pcp.     Please return here or go to the Emergency Department for any concerns or worsening of  condition.  Please follow up with your primary care doctor or specialist as needed.  If you  smoke, please stop smoking.    Blister (Adult)  A blister is a raised area of skin with clear, watery fluid inside. A blister can occur when the skin is damaged. Blisters can hurt when they are pressed, or if they break open.  Blisters can be caused in many ways. This can happen if the skin is rubbed too hard or often. Or they can occur if the skin is hurt by the sun, a virus, or even a medicine.  Most blisters need little treatment. They often dry up and go away in a few days to weeks after the cause is stopped. A blister may need to be cleaned. A broken (open) blister may be bandaged to prevent infection. Blisters caused by insect bites or drug reactions may be more serious. These should be looked at by a healthcare provider.  Home care  Your healthcare provider may prescribe pain medicine. He or she may also prescribe an antibiotic cream or ointment to put on an open blister. Follow all instructions when using these medicines.  General care:  · Follow all instructions on how to care for the blister. If a bandage was put on, change the bandage as instructed. .  · If the blister breaks, the area will leak a clear fluid for a day or 2. Wash the area with soap and water every day or as advised by your healthcare provider.  · You may use over-the-counter pain medicines to control pain, unless another medicine was prescribed. If you have chronic liver or kidney disease, talk with your healthcare provider before using these medicines. Also talk with your provider if you've had a stomach ulcer or gastrointestinal bleeding.  Follow-up care  Follow up with your healthcare provider, or as advised.  When to seek medical advice  Call your healthcare provider right away if any of these occur:  · Fever of 100.4°F (38°C) or higher  · Redness or swelling that is new or gets worse  · Foul-smelling fluid leaking from the blister  · Pain  doesnt go away, or gets worse  · Increase in size of the blister  · Blister doesnt get better after several days  Date Last Reviewed: 9/1/2016  © 5641-4500 [employer]. [address] [place] [address], [place] [address]. All rights reserved. This information is not intended as a substitute for professional medical care. Always follow your healthcare professional's instructions.

## 2019-09-20 RX ORDER — CLOPIDOGREL BISULFATE 75 MG/1
TABLET ORAL
Qty: 30 TABLET | Refills: 11 | Status: SHIPPED | OUTPATIENT
Start: 2019-09-20 | End: 2019-10-07 | Stop reason: SDUPTHER

## 2019-09-23 ENCOUNTER — TELEPHONE (OUTPATIENT)
Dept: INTERNAL MEDICINE | Facility: CLINIC | Age: 69
End: 2019-09-23

## 2019-09-25 ENCOUNTER — HOSPITAL ENCOUNTER (EMERGENCY)
Facility: HOSPITAL | Age: 69
Discharge: HOME | End: 2019-09-25
Attending: EMERGENCY MEDICINE
Payer: MEDICARE

## 2019-09-25 VITALS
HEIGHT: 62 IN | SYSTOLIC BLOOD PRESSURE: 187 MMHG | RESPIRATION RATE: 20 BRPM | DIASTOLIC BLOOD PRESSURE: 65 MMHG | TEMPERATURE: 98 F | BODY MASS INDEX: 25.76 KG/M2 | OXYGEN SATURATION: 100 % | HEART RATE: 88 BPM | WEIGHT: 140 LBS

## 2019-09-25 DIAGNOSIS — Z89.511 HX OF BKA, RIGHT: ICD-10-CM

## 2019-09-25 DIAGNOSIS — T14.8XXA ABRASION: Primary | ICD-10-CM

## 2019-09-25 PROCEDURE — 99284 EMERGENCY DEPT VISIT MOD MDM: CPT | Mod: 25

## 2019-09-25 PROCEDURE — 96372 THER/PROPH/DIAG INJ SC/IM: CPT

## 2019-09-25 PROCEDURE — 63600175 PHARM REV CODE 636 W HCPCS: Performed by: NURSE PRACTITIONER

## 2019-09-25 PROCEDURE — 25000003 PHARM REV CODE 250: Performed by: NURSE PRACTITIONER

## 2019-09-25 RX ORDER — KETOROLAC TROMETHAMINE 30 MG/ML
15 INJECTION, SOLUTION INTRAMUSCULAR; INTRAVENOUS
Status: COMPLETED | OUTPATIENT
Start: 2019-09-25 | End: 2019-09-25

## 2019-09-25 RX ORDER — BACITRACIN ZINC 500 UNIT/G
OINTMENT (GRAM) TOPICAL 2 TIMES DAILY
Qty: 30 G | Refills: 0 | Status: SHIPPED | OUTPATIENT
Start: 2019-09-25

## 2019-09-25 RX ORDER — LORAZEPAM 0.5 MG/1
0.5 TABLET ORAL
Status: COMPLETED | OUTPATIENT
Start: 2019-09-25 | End: 2019-09-25

## 2019-09-25 RX ADMIN — BACITRACIN, NEOMYCIN, POLYMYXIN B 1 EACH: 400; 3.5; 5 OINTMENT TOPICAL at 11:09

## 2019-09-25 RX ADMIN — LORAZEPAM 0.5 MG: 0.5 TABLET ORAL at 11:09

## 2019-09-25 RX ADMIN — KETOROLAC TROMETHAMINE 15 MG: 30 INJECTION, SOLUTION INTRAMUSCULAR at 11:09

## 2019-09-25 NOTE — ED NOTES
Wound care given to abrasion rt AKA stump. RN demonstrated to pt procedure to wrap stump for dressing changes. Pt verbalized understanding.

## 2019-09-25 NOTE — ED TRIAGE NOTES
Pt presents to ED with c/o abrasion too rt stump. approx size 0.5 x 0.7 cm. Irritation noticed on 9/19; abrasion noted on 9/22. Pt states irritation from wearing a new prosthesis. Abrasion pink with no drainage. Edema noted and pt confirms numbness to area. Denies pain.

## 2019-09-25 NOTE — ED PROVIDER NOTES
Encounter Date: 9/25/2019       History     Chief Complaint   Patient presents with    Abrasion     to right below the knee amputation that started September 19th. pt can no longer wear prosthetic. pt took BP medication and is having headaches every other day, denies blurry vision and chest pain     Patient is a 69-year-old female with medical history of hypertension, GERD, CAD, PAD, right BKA (10 years ago) presenting to the ED for abrasion.  Pt states she got a new prostatic last week and has had irritation since that time.  Pt states she saw her PCP on Friday and was advised to follow up with wound care.  Pt states she does not have a follow up scheduled yet.  Pt states she is concerned for infection.  Pt denies any fevers, chills, chest pain, or SOB.  No increased pain at stump site.      The history is provided by the patient.     Review of patient's allergies indicates:  No Known Allergies  Past Medical History:   Diagnosis Date    Coronary artery disease     GERD (gastroesophageal reflux disease)     Herpes simplex without mention of complication     Hyperlipidemia     Hypertension     PAD (peripheral artery disease)      Past Surgical History:   Procedure Laterality Date    BKA   mid 2012    Right side at shin level amputation. 2/2 PVD    CARDIAC SURGERY      CORONARY ARTERY BYPASS GRAFT      before [place]    HYSTERECTOMY       Family History   Problem Relation Age of Onset    Hypertension Mother     Diabetes Mother     Colon cancer Maternal Grandmother      Social History     Tobacco Use    Smoking status: Current Some Day Smoker     Packs/day: 1.50     Years: 40.00     Pack years: 60.00     Types: Cigarettes    Smokeless tobacco: Never Used   Substance Use Topics    Alcohol use: Yes     Comment: beer on weekends    Drug use: No     Review of Systems   Constitutional: Negative for fever.   HENT: Negative for sore throat.    Respiratory: Negative for shortness of breath.    Cardiovascular:  Negative for chest pain.   Gastrointestinal: Negative for nausea.   Genitourinary: Negative for dysuria.   Musculoskeletal: Positive for gait problem ( R BKA). Negative for back pain and joint swelling.   Skin: Positive for wound ( abrasion and blister to right stump). Negative for color change, pallor and rash.   Neurological: Negative for weakness.   Hematological: Does not bruise/bleed easily.   All other systems reviewed and are negative.      Physical Exam     Initial Vitals [09/25/19 1007]   BP Pulse Resp Temp SpO2   (!) 200/90 80 16 98.6 °F (37 °C) 99 %      MAP       --         Physical Exam    Nursing note and vitals reviewed.  Constitutional: Vital signs are normal. She appears well-developed and well-nourished. She is cooperative. She does not have a sickly appearance. She does not appear ill. No distress.   HENT:   Head: Normocephalic and atraumatic.   Eyes: EOM and lids are normal. Pupils are equal, round, and reactive to light.   Neck: Trachea normal and phonation normal.   Cardiovascular: Normal rate, regular rhythm and intact distal pulses.   Pulses:       Radial pulses are 2+ on the right side, and 2+ on the left side.   Pulmonary/Chest: Effort normal and breath sounds normal. She has no wheezes. She has no rhonchi.   Abdominal: Normal appearance.   Neurological: She is alert. She has normal strength. No sensory deficit. GCS eye subscore is 4. GCS verbal subscore is 5. GCS motor subscore is 6.   Skin: Skin is warm and dry. Capillary refill takes 2 to 3 seconds. Abrasion ( R BKA) and lesion ( blister to R BKA) noted. No bruising, no ecchymosis, no laceration and no rash noted. No pallor.   R BKA blister and abrasion.  Picture in chart.  No redness, erythema or swelling noted.       Psychiatric: She has a normal mood and affect.             ED Course   Procedures  Labs Reviewed - No data to display       Imaging Results    None          Medical Decision Making:   Initial Assessment:   Emergent  evaluation of a 70 yo female patient presenting to the ER with chief complaint of of abrasion and blister to right stump.  Patient states she had a BKA 10 years ago.  Patient states 1 week ago she got a new prosthesis which has caused a blister and abrasion to her right stump.  Patient denies any redness or drainage. Patient is concern for cellulitis.  Patient states it sometimes swells.  On exam patient is A&O x3.  Patient is slightly hypertensive in the ED.  Patient endorses right temporal headache.  Patient states no change in headache over the past few days.  Pupils equal round reactive 3-2 mm.  No JVD noted.  Breath sounds clear bilaterally.  Patient denies any chest pain, shortness of breath, dizziness or blurred vision.  Right stump warm and dry. No swelling redness or erythema noted.  Small blister and abrasion noted to right stump.  Pictures in chart.  No clinical signs of cellulitis noted.  Compartment soft.  No signs of compartment syndrome.      Differential Diagnosis:   Differential diagnoses include but are not limited to abrasion, contusion, cellulitis, blister, prosthesis irritation, abscess, stump infection.      ED Management:  I do not feel labs or imaging are pertinent for the care this patient.  Stump is soft with no erythema redness or drainage noted.  Patient advised to keep area clean and dry.  Bacitracin for abrasion.  Contact prosthesis company for re-sizing.  Patient verbalized understanding of this plan of care.  All questions and concerns addressed.    Patient is hemodynamically stable, vital signs are normal. Discharge instructions given. Return to ED precautions discussed. Follow up as directed. Pt verbalized understanding of this plan.  Pt is stable for discharge.                       Clinical Impression:       ICD-10-CM ICD-9-CM   1. Abrasion T14.8XXA 919.0   2. Hx of BKA, right Z89.511 V49.75         Disposition:   Disposition: Discharged  Condition: Stable                         Ana Peterson, NP  09/25/19 1252

## 2019-09-25 NOTE — ED NOTES
BLAKE Lim updated with bp; states ok to d/c pt. Discharge instructions given; pt verbalized understanding.

## 2019-09-25 NOTE — DISCHARGE INSTRUCTIONS
Please keep your stump clean and dry.  Bacitracin and dressing twice a day.  Follow up with wound care as needed.  Contact your prosthesis company for reevaluation.       Our goal in the emergency department is to always give you outstanding care and exceptional service. You may receive a survey by mail or e-mail in the next week regarding your experience in our ED. We would greatly appreciate your completing and returning the survey. Your feedback provides us with a way to recognize our staff who give very good care and it helps us learn how to improve when your experience was below our aspiration of excellence.

## 2019-10-04 ENCOUNTER — PATIENT OUTREACH (OUTPATIENT)
Dept: ADMINISTRATIVE | Facility: OTHER | Age: 69
End: 2019-10-04

## 2019-10-07 ENCOUNTER — HOSPITAL ENCOUNTER (OUTPATIENT)
Dept: RADIOLOGY | Facility: HOSPITAL | Age: 69
Discharge: HOME | End: 2019-10-07
Attending: INTERNAL MEDICINE
Payer: MEDICARE

## 2019-10-07 ENCOUNTER — TELEPHONE (OUTPATIENT)
Dept: PODIATRY | Facility: CLINIC | Age: 69
End: 2019-10-07

## 2019-10-07 ENCOUNTER — TELEPHONE (OUTPATIENT)
Dept: CARDIOLOGY | Facility: CLINIC | Age: 69
End: 2019-10-07

## 2019-10-07 ENCOUNTER — OFFICE VISIT (OUTPATIENT)
Dept: CARDIOLOGY | Facility: CLINIC | Age: 69
End: 2019-10-07
Payer: MEDICARE

## 2019-10-07 VITALS
SYSTOLIC BLOOD PRESSURE: 189 MMHG | OXYGEN SATURATION: 72 % | HEART RATE: 77 BPM | HEIGHT: 62 IN | WEIGHT: 140 LBS | BODY MASS INDEX: 25.76 KG/M2 | DIASTOLIC BLOOD PRESSURE: 78 MMHG

## 2019-10-07 DIAGNOSIS — Z89.511 HX OF BKA, RIGHT: ICD-10-CM

## 2019-10-07 DIAGNOSIS — I25.810 CORONARY ARTERY DISEASE INVOLVING OTHER CORONARY ARTERY BYPASS GRAFT, ANGINA PRESENCE UNSPECIFIED: Primary | ICD-10-CM

## 2019-10-07 DIAGNOSIS — I10 ESSENTIAL HYPERTENSION: ICD-10-CM

## 2019-10-07 DIAGNOSIS — S81.801A WOUND OF RIGHT LOWER EXTREMITY, INITIAL ENCOUNTER: ICD-10-CM

## 2019-10-07 DIAGNOSIS — I73.9 PVD (PERIPHERAL VASCULAR DISEASE): ICD-10-CM

## 2019-10-07 PROCEDURE — 73590 X-RAY EXAM OF LOWER LEG: CPT | Mod: 26,RT,, | Performed by: RADIOLOGY

## 2019-10-07 PROCEDURE — 93000 ELECTROCARDIOGRAM COMPLETE: CPT | Mod: S$GLB,,, | Performed by: INTERNAL MEDICINE

## 2019-10-07 PROCEDURE — 3078F DIAST BP <80 MM HG: CPT | Mod: CPTII,S$GLB,, | Performed by: INTERNAL MEDICINE

## 2019-10-07 PROCEDURE — 73590 X-RAY EXAM OF LOWER LEG: CPT | Mod: TC,FY,RT

## 2019-10-07 PROCEDURE — 3077F SYST BP >= 140 MM HG: CPT | Mod: CPTII,S$GLB,, | Performed by: INTERNAL MEDICINE

## 2019-10-07 PROCEDURE — 93000 EKG 12-LEAD: ICD-10-PCS | Mod: S$GLB,,, | Performed by: INTERNAL MEDICINE

## 2019-10-07 PROCEDURE — 99999 PR PBB SHADOW E&M-EST. PATIENT-LVL IV: CPT | Mod: PBBFAC,,, | Performed by: INTERNAL MEDICINE

## 2019-10-07 PROCEDURE — 73590 XR TIBIA FIBULA 2 VIEW RIGHT: ICD-10-PCS | Mod: 26,RT,, | Performed by: RADIOLOGY

## 2019-10-07 PROCEDURE — 99214 OFFICE O/P EST MOD 30 MIN: CPT | Mod: S$GLB,,, | Performed by: INTERNAL MEDICINE

## 2019-10-07 PROCEDURE — 99999 PR PBB SHADOW E&M-EST. PATIENT-LVL IV: ICD-10-PCS | Mod: PBBFAC,,, | Performed by: INTERNAL MEDICINE

## 2019-10-07 PROCEDURE — 3077F PR MOST RECENT SYSTOLIC BLOOD PRESSURE >= 140 MM HG: ICD-10-PCS | Mod: CPTII,S$GLB,, | Performed by: INTERNAL MEDICINE

## 2019-10-07 PROCEDURE — 1101F PR PT FALLS ASSESS DOC 0-1 FALLS W/OUT INJ PAST YR: ICD-10-PCS | Mod: CPTII,S$GLB,, | Performed by: INTERNAL MEDICINE

## 2019-10-07 PROCEDURE — 99214 PR OFFICE/OUTPT VISIT, EST, LEVL IV, 30-39 MIN: ICD-10-PCS | Mod: S$GLB,,, | Performed by: INTERNAL MEDICINE

## 2019-10-07 PROCEDURE — 1101F PT FALLS ASSESS-DOCD LE1/YR: CPT | Mod: CPTII,S$GLB,, | Performed by: INTERNAL MEDICINE

## 2019-10-07 PROCEDURE — 3078F PR MOST RECENT DIASTOLIC BLOOD PRESSURE < 80 MM HG: ICD-10-PCS | Mod: CPTII,S$GLB,, | Performed by: INTERNAL MEDICINE

## 2019-10-07 RX ORDER — NIFEDIPINE 90 MG/1
90 TABLET, EXTENDED RELEASE ORAL DAILY
Qty: 30 TABLET | Refills: 11 | Status: SHIPPED | OUTPATIENT
Start: 2019-10-07 | End: 2020-03-31 | Stop reason: SDUPTHER

## 2019-10-07 RX ORDER — LOSARTAN POTASSIUM 50 MG/1
50 TABLET ORAL 2 TIMES DAILY
Qty: 180 TABLET | Refills: 0 | Status: SHIPPED | OUTPATIENT
Start: 2019-10-07

## 2019-10-07 RX ORDER — CLOPIDOGREL BISULFATE 75 MG/1
75 TABLET ORAL DAILY
Qty: 30 TABLET | Refills: 11 | Status: SHIPPED | OUTPATIENT
Start: 2019-10-07

## 2019-10-07 NOTE — TELEPHONE ENCOUNTER
----- Message from Emily Matias MA sent at 10/7/2019  2:01 PM CDT -----  Hello,   Can you please help me with getting patient scheduled in podiatry for this week or early next week. Referred by Dr Gonsalves in cardiology. Per Dr Gonsalves; the wound on the right noted the knee amputation stump patient is to be superficial.    Thank you,     ERICH Diaz

## 2019-10-07 NOTE — PATIENT INSTRUCTIONS
How to Quit Smoking  Smoking is one of the hardest habits to break. About half of all people who have ever smoked have been able to quit. Most people who still smoke want to quit. Here are some of the best ways to stop smoking.    Keep trying  Most smokers make many attempts at quitting before they are successful. Its important not to give up.  Go cold turkey  Most former smokers quit cold turkey (all at once). Trying to cut back gradually doesn't seem to work as well, perhaps because it continues the smoking habit. Also, it is possible to inhale more while smoking fewer cigarettes. This results in the same amount of nicotine in your body.  Get support  Support programs can be a big help, especially for heavy smokers. These groups offer lectures, ways to change behavior, and peer support. Here are some ways to find a support program:  · Free national quitline: 800-QUIT-NOW (756-895-4333).  · Hospital quit-smoking programs.  · American Lung Association: (808.691.6106).  · American Cancer Society (244-446-5672).  Support at home is important too. Nonsmokers can offer praise and encouragement. If the smoker in your life finds it hard to quit, encourage them to keep trying.  Over-the-counter medicines  Nicotine replacement therapy may make quitting easier. Certain aids, such as the nicotine patch, gum, and lozenges, are available without a prescription. It is best to use these under a doctors care, though. The skin patch provides a steady supply of nicotine. Nicotine gum and lozenges give temporary bursts of low levels of nicotine. Both methods reduce the craving for cigarettes. Warning: If you have nausea, vomiting, dizziness, weakness, or a fast heartbeat, stop using these products and see your doctor.  Prescription medicines  After reviewing your smoking patterns and past attempts to quit, your doctor may offer a prescription medicine such as bupropion, varenicline, a nicotine inhaler, or nasal spray. Each has  "advantages and side effects. Your doctor can review these with you.  Health benefits of quitting  The benefits of quitting start right away and keep improving the longer you go without smoking. These benefits occur at any age.  So whether you are 17 or 70, quitting is a good decision. Some of the benefits include:  · 20 minutes: Blood pressure and pulse return to normal.  · 8 hours: Oxygen levels return to normal.  · 2 days: Ability to smell and taste begin to improve as damaged nerves regrow.  · 2 to 3 weeks: Circulation and lung function improve.  · 1 to 9 months: Coughing, congestion, and shortness of breath decrease; tiredness decreases.  · 1 year: Risk of heart attack decreases by half.  · 5 years: Risk of lung cancer decreases by half; risk of stroke becomes the same as a nonsmokers.  For more on how to quit smoking, try these online resources:   · [employer]  · "Clearing the Air" booklet from the National Cancer [place]: [place]_.gov/sites/default/files/pdf/clearing-the-air-accessible.pdf  Date Last Reviewed: 3/1/2017  © 3466-9667 [employer]. [address] [place] [address]. All rights reserved. This information is not intended as a substitute for professional medical care. Always follow your healthcare professional's instructions.          Established High Blood Pressure    High blood pressure (hypertension) is a chronic disease. Often, healthcare providers dont know what causes it. But it can be caused by certain health conditions and medicines.  If you have high blood pressure, you may not have any symptoms. If you do have symptoms, they may include headache, dizziness, changes in your vision, chest pain, and shortness of breath. But even without symptoms, high blood pressure thats not treated raises your risk for heart attack and stroke. High blood pressure is a serious health risk and shouldnt be ignored.  A blood pressure reading is made up of two numbers: a higher " number over a lower number. The top number is the systolic pressure. The bottom number is the diastolic pressure. A normal blood pressure is a systolic pressure of  less than 120 over a diastolic pressure of less than 80. You will see your blood pressure readings written together. For example, a person with a systolic pressure of 188 and a diastolic pressure of 78 will have 118/78 written in the medical record.  High blood pressure is when either the top number is 140 or higher, or the bottom number is 90 or higher. This must be the result when taking your blood pressure a number of times. The blood pressures between normal and high are called prehypertension.  Home care  If you have high blood pressure, you should do what is listed below to lower your blood pressure. If you are taking medicines for high blood pressure, these methods may reduce or end your need for medicines in the future.  · Begin a weight-loss program if you are overweight.  · Cut back on how much salt you get in your diet. Heres how to do this:  ¨ Dont eat foods that have a lot of salt. These include olives, pickles, smoked meats, and salted potato chips.  ¨ Dont add salt to your food at the table.  ¨ Use only small amounts of salt when cooking.  · Start an exercise program. Talk with your healthcare provider about the type of exercise program that would be best for you. It doesn't have to be hard. Even brisk walking for 20 minutes 3 times a week is a good form of exercise.  · Dont take medicines that stimulate the heart. This includes many over-the-counter cold and sinus decongestant pills and sprays, as well as diet pills. Check the warnings about hypertension on the label. Before buying any over-the-counter medicines or supplements, always ask the pharmacist about the product's potential interaction with your high blood pressure and your high blood pressure medicines.  · Stimulants such as amphetamine or cocaine could be deadly for someone  with high blood pressure. Never take these.  · Limit how much caffeine you get in your diet. Switch to caffeine-free products.  · Stop smoking. If you are a long-time smoker, this can be hard. Talk to your healthcare provider about medicines and nicotine replacement options to help you. Also, enroll in a stop-smoking program to make it more likely that you will quit for good.  · Learn how to handle stress. This is an important part of any program to lower blood pressure. Learn about relaxation methods like meditation, yoga, or biofeedback.  · If your provider prescribed medicines, take them exactly as directed. Missing doses may cause your blood pressure get out of control.  · If you miss a dose or doses, check with your healthcare provider or pharmacist about what to do.  · Consider buying an automatic blood pressure machine. Ask your provider for a recommendation. You can get one of these at most pharmacies.     The American Heart Association recommends the following guidelines for home blood pressure monitoring:  · Don't smoke or drink coffee for 30 minutes before taking your blood pressure.  · Go to the bathroom before the test.  · Relax for 5 minutes before taking the measurement.  · Sit with your back supported (don't sit on a couch or soft chair); keep your feet on the floor uncrossed. Place your arm on a solid flat surface (like a table) with the upper part of the arm at heart level. Place the middle of the cuff directly above the eye of the elbow. Check the monitor's instruction manual for an illustration.  · Take multiple readings. When you measure, take 2 to 3 readings one minute apart and record all of the results.  · Take your blood pressure at the same time every day, or as your healthcare provider recommends.  · Record the date, time, and blood pressure reading.  · Take the record with you to your next medical appointment. If your blood pressure monitor has a built-in memory, simply take the monitor  with you to your next appointment.  · Call your provider if you have several high readings. Don't be frightened by a single high blood pressure reading, but if you get several high readings, check in with your healthcare provider.  · Note: When blood pressure reaches a systolic (top number) of 180 or higher OR diastolic (bottom number) of 110 or higher, seek emergency medical treatment.  Follow-up care  You will need to see your healthcare provider regularly. This is to check your blood pressure and to make changes to your medicines. Make a follow-up appointment as directed. Bring the record of your home blood pressure readings to the appointment.  When to seek medical advice  Call your healthcare provider right away if any of these occur:  · Blood pressure reaches a systolic (upper number) of 180 or higher OR a diastolic (bottom number) of 110 or higher  · Chest pain or shortness of breath  · Severe headache  · Throbbing or rushing sound in the ears  · Nosebleed  · Sudden severe pain in your belly (abdomen)  · Extreme drowsiness, confusion, or fainting  · Dizziness or spinning sensation (vertigo)  · Weakness of an arm or leg or one side of the face  · You have problems speaking or seeing   Date Last Reviewed: 12/1/2016  © 1072-1615 [employer]. [address] [place] [address], [place] [address]. All rights reserved. This information is not intended as a substitute for professional medical care. Always follow your healthcare professional's instructions.

## 2019-10-07 NOTE — PROGRESS NOTES
Subjective:   @Patient ID:  Roma Dunbar is a 69 y.o. female who presents for follow-up of CAD, PAD      HPI:     Dictation #1  MRN:[id number]  CSN:[id number]  Patient here for follow-up.  She went to the emergency room in September 25th 2019 after she had an abrasion in the right below-the-knee amputation stump.  Patient was concerned about infection.  She was evaluated and was discharged to follow up with wound care.  The wound is not getting worse but that is not getting better either.  No discharge noted.  And no tenderness.  But she reports swelling in the stump and it is more puffy than it is usually.    Patient still actively smokes.  She has extensive peripheral arterial disease S findings as mentioned above.      No fever no chills.    For the hypertension blood pressure is elevated today she has been under stress because can't use the prosthesis, and she is using walker.     Reports intermittent chest pain that is mostly related to coughing.  EKG in  the office no acute ischemic changes.    Pertinent hx  PAD s/p RBKA s/p occluded bypass, left SFA  with one vs run off, CAD s/p CABG, HTN, HLD, COPD, tobacco use     Prior cardiovascular  Hx  --------------------------------  CAD s/p CABG unknown date    [facility] in 9/2016 for angina class III and TID in stress test---> LM patent , LAD severe disease,  LCX 80% small vs, RVA 40%,  Patent LIMA-LAD, occluded SVG graft unknown attachment.     Peripheral angiogram 6/2018 : LSFA  with distal reconstitution via profunda collaterals to popliteal.    Single vessel runoff to the foot on the left via diseased peroneal with distal collaterals to a DP.    RCFA  with reconstitution at distal popliteal (has RBKA).    - ECHO Echo  9/2016 EF 6-65%, mild AS, mild AR.          Patient Active Problem List    Diagnosis Date Noted    Wound of right leg 10/07/2019    Cervical myofascial pain syndrome 06/24/2019    Spondylosis of cervical region without myelopathy or radiculopathy  06/24/2019    Chronic neck pain 06/10/2019    Allergic rhinitis 05/20/2019    Overweight (BMI 25.0-29.9) 04/15/2019    Need for prophylactic vaccination with Streptococcus pneumoniae (Pneumococcus) and Influenza vaccines 04/15/2019    PAD (peripheral artery disease) 07/10/2018    Arterial occlusion 06/21/2018    Atherosclerosis of both lower extremities with intermittent claudication 06/19/2018    Amenorrhea 03/22/2018    Disorder of bone  03/22/2018    Hx of right BKA 02/17/2018    Upper respiratory tract infection 02/17/2018     start tessalon prn      Anxiety 02/17/2018     start zoloft and re-evaluate in 1 month       Depression 02/17/2018     start zoloft and re-evaluate in 1 month; pt. denies suicidal ideation       Anemia 02/16/2018    Bronchitis 01/08/2018    Flu-like symptoms 01/08/2018    Melena 08/19/2017    Gastrointestinal hemorrhage with melena 08/19/2017    Sacroiliitis 01/17/2017    Right lumbar radiculopathy 01/17/2017    Lumbar facet arthropathy 01/17/2017    Insomnia secondary to chronic pain 01/11/2017    Needs flu shot 01/11/2017    Lipoma of torso 01/11/2017    Ischemic chest pain 09/28/2016    Midline low back pain without sciatica 08/09/2016    Abnormal mammogram 08/09/2016    Hematuria 08/09/2016    Thrombocytopenia 08/09/2016    Tobacco dependence 07/18/2016    Hx of BKA, right 07/18/2016    PVD (peripheral vascular disease) 07/18/2016    Screening for cancer 07/18/2016    Hyperlipemia 07/18/2016    Coronary artery disease 07/18/2016     continue current regimen and refer to cardiology in area as per pt. request      Pressure sore 09/14/2015    Wrist pain 08/14/2015    H/O abdominal abscess 08/14/2015    Tobacco abuse counseling 06/15/2015    Centrilobular emphysema 01/29/2015    Urinary incontinence, urge 06/05/2013    Stump pain 05/30/2013    Hyperlipidemia 05/20/2013    Essential hypertension            Left Arm BP - Sitting: (P)  184/84        LAST HbA1c  Lab Results   Component Value Date    HGBA1C 5.3 05/15/2019       Lipid panel  Lab Results   Component Value Date    CHOL 164 05/15/2019    CHOL 178 03/07/2018    CHOL 152 08/19/2017     Lab Results   Component Value Date    HDL 65 05/15/2019    HDL 73 03/07/2018    HDL 39 (L) 08/19/2017     Lab Results   Component Value Date    LDLCALC 88.6 05/15/2019    LDLCALC 90.6 03/07/2018    LDLCALC 95.6 08/19/2017     Lab Results   Component Value Date    TRIG 52 05/15/2019    TRIG 72 03/07/2018    TRIG 87 08/19/2017     Lab Results   Component Value Date    CHOLHDL 39.6 05/15/2019    CHOLHDL 41.0 03/07/2018    CHOLHDL 25.7 08/19/2017            Review of Systems   Constitution: Negative for chills and fever.   HENT: Negative for hearing loss and nosebleeds.    Eyes: Negative for blurred vision.   Cardiovascular: Negative for chest pain and palpitations.        As in HPI   Respiratory: Negative for hemoptysis and shortness of breath.    Hematologic/Lymphatic: Negative for bleeding problem.   Skin: Negative for itching.        As in HPI   Musculoskeletal: Negative for falls.        As in HPI   Gastrointestinal: Negative for abdominal pain and hematochezia.   Genitourinary: Negative for hematuria.   Neurological: Negative for dizziness and loss of balance.   Psychiatric/Behavioral: Negative for altered mental status and depression.       Objective:   Physical Exam   Constitutional: She is oriented to person, place, and time. She appears well-developed and well-nourished.   HENT:   Head: Normocephalic and atraumatic.   Eyes: Conjunctivae are normal.   Neck: Neck supple. Carotid bruit is not present.   Cardiovascular: Normal rate, regular rhythm and normal heart sounds. Exam reveals no gallop and no friction rub.   No murmur heard.  Pulmonary/Chest: Effort normal and breath sounds normal. No stridor. No respiratory distress. She has no wheezes.   Musculoskeletal:   Rt BKA amputation.      Neurological:  She is alert and oriented to person, place, and time.   Skin: Skin is warm and dry.   Abrasion the the amputation stump with swelling. No discharge.     Psychiatric: She has a normal mood and affect. Her behavior is normal.       Assessment:     1. Coronary artery disease involving other coronary artery bypass graft, angina presence unspecified    2. Chest pain    3. PVD (peripheral vascular disease)    4. Essential hypertension Poorly controlled   5. Hx of BKA, right    6. Wound of right lower extremity, initial encounter        Plan:     - Chest pain appears to be a pleuritic.  Continue medical therapy including aspirin and statin beta blockers and Plavix.  We will need to cut down the aspirin to 81 mg will discuss with the patient next visit.      Patient to follow up with a wound care for the wound on the right noted the knee amputation stump patient is to be superficial.  Will order an x-ray to rule out any underlying osteo time the patient has follow-up with the wound care team.     - Check arterial ultrasound on the right side. We will evaluate the patient after 2 weeks after seen by the podiatry team.  To evaluate if any further intervention is needed.     DC Norvasc and start nifedipine 90 mg long-acting for uncontrolled hypertension.     Tobacco cessation counseling done with the patient and lifestyle modification.       Pertinent cardiac images and EKG reviewed independently.    Continue with current medical plan and lifestyle changes.  Return sooner for concerns or questions. If symptoms persist go to the ED  I have reviewed all pertinent data including patient's medical history in detail and updated the computerized patient record.     Orders Placed This Encounter   Procedures    X-Ray Tibia Fibula 2 View Right     Standing Status:   Future     Number of Occurrences:   1     Standing Expiration Date:   10/7/2020     Order Specific Question:   Reason for Exam:     Answer:   Rule out osteo     Order  Specific Question:   May the Radiologist modify the order per protocol to meet the clinical needs of the patient?     Answer:   Yes    US Lower Extremity Arteries Right     Standing Status:   Future     Standing Expiration Date:   10/6/2020    Ambulatory Referral to Podiatry     Referral Priority:   Routine     Referral Type:   Consultation     Referral Reason:   Specialty Services Required     Requested Specialty:   Podiatry     Number of Visits Requested:   1    IN OFFICE EKG 12-LEAD (to Muse)     Order Specific Question:   Diagnosis     Answer:   Chest pain [[id number]]       Follow up as scheduled.     She expressed verbal understanding and agreed with the plan    Patient's Medications   New Prescriptions    NIFEDIPINE (PROCARDIA-XL) 90 MG (OSM) 24 HR TABLET    Take 1 tablet (90 mg total) by mouth once daily.   Previous Medications    ASPIRIN 325 MG TABLET    Take 325 mg by mouth once daily.    ATORVASTATIN (LIPITOR) 10 MG TABLET    TAKE 1 TABLET BY MOUTH ONCE DAILY    BACITRACIN 500 UNIT/GRAM OINT    Apply topically 2 (two) times daily.    CILOSTAZOL (PLETAL) 50 MG TAB    TAKE 1 TABLET BY MOUTH TWICE DAILY    METOPROLOL SUCCINATE (TOPROL-XL) 50 MG 24 HR TABLET    Take 1 tablet (50 mg total) by mouth 2 (two) times daily.    NICOTINE (NICODERM CQ) 21 MG/24 HR    Place 1 patch onto the skin once daily.    NICOTINE POLACRILEX 2 MG LOZG    Take 1 lozenge (2 mg total) by mouth as needed (Use in place of cigarettes).    TIZANIDINE (ZANAFLEX) 4 MG TABLET    Take 1 tablet (4 mg total) by mouth nightly as needed.   Modified Medications    Modified Medication Previous Medication    CLOPIDOGREL (PLAVIX) 75 MG TABLET clopidogrel (PLAVIX) 75 mg tablet       Take 1 tablet (75 mg total) by mouth once daily.    TAKE 1 TABLET BY MOUTH ONCE DAILY    LOSARTAN (COZAAR) 50 MG TABLET losartan (COZAAR) 50 MG tablet       Take 1 tablet (50 mg total) by mouth 2 (two) times daily.    TAKE 1 TABLET BY MOUTH TWICE DAILY   Discontinued  Medications    AMLODIPINE (NORVASC) 10 MG TABLET    Take 1 tablet (10 mg total) by mouth every evening.

## 2019-10-08 ENCOUNTER — PATIENT OUTREACH (OUTPATIENT)
Dept: ADMINISTRATIVE | Facility: HOSPITAL | Age: 69
End: 2019-10-08

## 2019-10-08 ENCOUNTER — TELEPHONE (OUTPATIENT)
Dept: CARDIOLOGY | Facility: CLINIC | Age: 69
End: 2019-10-08

## 2019-10-08 NOTE — TELEPHONE ENCOUNTER
I spoke to patient and let her know [facility] is not in network with patient's plan, patient needs to call insurance to find out where she can go to have test covered. Patient understood

## 2019-10-09 NOTE — TELEPHONE ENCOUNTER
----- Message from Rosy Krishnan sent at 10/9/2019  8:38 AM CDT -----  Contact: Patient  Type: Needs Medical Advice    Who Called:  Patient  Symptoms (please be specific):  na  How long has patient had these symptoms:  yari  Pharmacy name and phone #:  [facility]  Best Call Back Number: [phone]  Additional Information: Patient is calling to discuss her ultrasound scheduled on 10/11. States that her insurance ([employer]) will not cover for her to have the test completed at [facility]. Patient is requesting a call back to discuss her other options for places to take the test. Please call to advise, thank you!

## 2019-10-10 ENCOUNTER — TELEPHONE (OUTPATIENT)
Dept: CARDIOLOGY | Facility: CLINIC | Age: 69
End: 2019-10-10

## 2019-10-10 NOTE — TELEPHONE ENCOUNTER
----- Message from Sara Oneill sent at 10/10/2019  9:43 AM CDT -----  Contact: pt  Pt would like to be called back regarding a US orders    Pt can be reached at [phone]

## 2019-10-11 ENCOUNTER — PATIENT OUTREACH (OUTPATIENT)
Dept: ADMINISTRATIVE | Facility: OTHER | Age: 69
End: 2019-10-11

## 2019-10-11 NOTE — TELEPHONE ENCOUNTER
I had to refax US order to [facility]. They said they did not receive. Patient will let me know if they receive it. I let her know that I tried calling them to confirm they received it but no one was answering the phone at the department they transferred me to.

## 2019-10-11 NOTE — TELEPHONE ENCOUNTER
----- Message from Maggie Alonso sent at 10/11/2019  9:51 AM CDT -----  Pt is calling to requesting a call back concerning pt is requesting orders for the Ultrasound to be sent to [facility]. Pt says the diagnostic ultrasound is out of her insurance network.    Pt is requesting orders for US EXTREMITY ARTERY  to be sent today to [facility] at  please fax to [phone]    Please give pt a call back as well at [phone]      Thank you!

## 2019-10-18 ENCOUNTER — PATIENT OUTREACH (OUTPATIENT)
Dept: ADMINISTRATIVE | Facility: OTHER | Age: 69
End: 2019-10-18

## 2020-03-31 DIAGNOSIS — I10 ESSENTIAL HYPERTENSION: ICD-10-CM

## 2020-03-31 RX ORDER — NIFEDIPINE 90 MG/1
90 TABLET, EXTENDED RELEASE ORAL DAILY
Qty: 30 TABLET | Refills: 11 | Status: SHIPPED | OUTPATIENT
Start: 2020-03-31 | End: 2021-03-31

## 2021-03-15 DIAGNOSIS — Z12.31 ENCOUNTER FOR SCREENING MAMMOGRAM FOR MALIGNANT NEOPLASM OF BREAST: Primary | ICD-10-CM

## 2021-03-15 DIAGNOSIS — Z78.0 ASYMPTOMATIC MENOPAUSE: ICD-10-CM

## 2021-03-22 ENCOUNTER — HOSPITAL ENCOUNTER (OUTPATIENT)
Dept: RADIOLOGY | Facility: HOSPITAL | Age: 71
Discharge: HOME | End: 2021-03-22
Attending: INTERNAL MEDICINE
Payer: MEDICARE

## 2021-03-22 DIAGNOSIS — Z78.0 ASYMPTOMATIC MENOPAUSE: ICD-10-CM

## 2021-03-22 DIAGNOSIS — Z12.31 ENCOUNTER FOR SCREENING MAMMOGRAM FOR MALIGNANT NEOPLASM OF BREAST: ICD-10-CM

## 2021-03-22 PROCEDURE — 77067 MAMMO DIGITAL SCREENING BILAT WITH TOMO: ICD-10-PCS | Mod: 26,,, | Performed by: RADIOLOGY

## 2021-03-22 PROCEDURE — 77067 SCR MAMMO BI INCL CAD: CPT | Mod: TC

## 2021-03-22 PROCEDURE — 77067 SCR MAMMO BI INCL CAD: CPT | Mod: 26,,, | Performed by: RADIOLOGY

## 2021-03-22 PROCEDURE — 77080 DEXA BONE DENSITY SPINE HIP: ICD-10-PCS | Mod: 26,,, | Performed by: RADIOLOGY

## 2021-03-22 PROCEDURE — 77080 DXA BONE DENSITY AXIAL: CPT | Mod: 26,,, | Performed by: RADIOLOGY

## 2021-03-22 PROCEDURE — 77080 DXA BONE DENSITY AXIAL: CPT | Mod: TC

## 2021-03-22 PROCEDURE — 77063 MAMMO DIGITAL SCREENING BILAT WITH TOMO: ICD-10-PCS | Mod: 26,,, | Performed by: RADIOLOGY

## 2021-03-22 PROCEDURE — 77063 BREAST TOMOSYNTHESIS BI: CPT | Mod: 26,,, | Performed by: RADIOLOGY

## 2022-05-19 ENCOUNTER — TELEPHONE (OUTPATIENT)
Dept: HEMATOLOGY/ONCOLOGY | Facility: CLINIC | Age: 72
End: 2022-05-19
Payer: MEDICARE

## 2022-05-19 NOTE — TELEPHONE ENCOUNTER
Confirmed upcoming appointment with the patient's daughter Josefa to see Dr. Gonzalez on 5/31 at 8:40am

## 2022-05-31 ENCOUNTER — OFFICE VISIT (OUTPATIENT)
Dept: HEMATOLOGY/ONCOLOGY | Facility: CLINIC | Age: 72
End: 2022-05-31
Payer: MEDICARE

## 2022-05-31 VITALS
SYSTOLIC BLOOD PRESSURE: 171 MMHG | BODY MASS INDEX: 20.82 KG/M2 | HEART RATE: 73 BPM | RESPIRATION RATE: 18 BRPM | HEIGHT: 62 IN | OXYGEN SATURATION: 94 % | WEIGHT: 113.13 LBS | TEMPERATURE: 98 F | DIASTOLIC BLOOD PRESSURE: 73 MMHG

## 2022-05-31 DIAGNOSIS — J44.9 CHRONIC OBSTRUCTIVE PULMONARY DISEASE, UNSPECIFIED COPD TYPE: ICD-10-CM

## 2022-05-31 DIAGNOSIS — I73.9 PAD (PERIPHERAL ARTERY DISEASE): ICD-10-CM

## 2022-05-31 DIAGNOSIS — C78.7 SECONDARY LIVER CANCER: Primary | ICD-10-CM

## 2022-05-31 PROCEDURE — 1160F RVW MEDS BY RX/DR IN RCRD: CPT | Mod: CPTII,S$GLB,, | Performed by: INTERNAL MEDICINE

## 2022-05-31 PROCEDURE — 99999 PR PBB SHADOW E&M-EST. PATIENT-LVL III: ICD-10-PCS | Mod: PBBFAC,,, | Performed by: INTERNAL MEDICINE

## 2022-05-31 PROCEDURE — 3077F SYST BP >= 140 MM HG: CPT | Mod: CPTII,S$GLB,, | Performed by: INTERNAL MEDICINE

## 2022-05-31 PROCEDURE — 3288F FALL RISK ASSESSMENT DOCD: CPT | Mod: CPTII,S$GLB,, | Performed by: INTERNAL MEDICINE

## 2022-05-31 PROCEDURE — 1160F PR REVIEW ALL MEDS BY PRESCRIBER/CLIN PHARMACIST DOCUMENTED: ICD-10-PCS | Mod: CPTII,S$GLB,, | Performed by: INTERNAL MEDICINE

## 2022-05-31 PROCEDURE — 99999 PR PBB SHADOW E&M-EST. PATIENT-LVL III: CPT | Mod: PBBFAC,,, | Performed by: INTERNAL MEDICINE

## 2022-05-31 PROCEDURE — 3008F PR BODY MASS INDEX (BMI) DOCUMENTED: ICD-10-PCS | Mod: CPTII,S$GLB,, | Performed by: INTERNAL MEDICINE

## 2022-05-31 PROCEDURE — 3288F PR FALLS RISK ASSESSMENT DOCUMENTED: ICD-10-PCS | Mod: CPTII,S$GLB,, | Performed by: INTERNAL MEDICINE

## 2022-05-31 PROCEDURE — 1159F PR MEDICATION LIST DOCUMENTED IN MEDICAL RECORD: ICD-10-PCS | Mod: CPTII,S$GLB,, | Performed by: INTERNAL MEDICINE

## 2022-05-31 PROCEDURE — 1101F PR PT FALLS ASSESS DOC 0-1 FALLS W/OUT INJ PAST YR: ICD-10-PCS | Mod: CPTII,S$GLB,, | Performed by: INTERNAL MEDICINE

## 2022-05-31 PROCEDURE — 1125F PR PAIN SEVERITY QUANTIFIED, PAIN PRESENT: ICD-10-PCS | Mod: CPTII,S$GLB,, | Performed by: INTERNAL MEDICINE

## 2022-05-31 PROCEDURE — 3077F PR MOST RECENT SYSTOLIC BLOOD PRESSURE >= 140 MM HG: ICD-10-PCS | Mod: CPTII,S$GLB,, | Performed by: INTERNAL MEDICINE

## 2022-05-31 PROCEDURE — 99205 PR OFFICE/OUTPT VISIT, NEW, LEVL V, 60-74 MIN: ICD-10-PCS | Mod: S$GLB,,, | Performed by: INTERNAL MEDICINE

## 2022-05-31 PROCEDURE — 3078F DIAST BP <80 MM HG: CPT | Mod: CPTII,S$GLB,, | Performed by: INTERNAL MEDICINE

## 2022-05-31 PROCEDURE — 1159F MED LIST DOCD IN RCRD: CPT | Mod: CPTII,S$GLB,, | Performed by: INTERNAL MEDICINE

## 2022-05-31 PROCEDURE — 3078F PR MOST RECENT DIASTOLIC BLOOD PRESSURE < 80 MM HG: ICD-10-PCS | Mod: CPTII,S$GLB,, | Performed by: INTERNAL MEDICINE

## 2022-05-31 PROCEDURE — 99205 OFFICE O/P NEW HI 60 MIN: CPT | Mod: S$GLB,,, | Performed by: INTERNAL MEDICINE

## 2022-05-31 PROCEDURE — 1125F AMNT PAIN NOTED PAIN PRSNT: CPT | Mod: CPTII,S$GLB,, | Performed by: INTERNAL MEDICINE

## 2022-05-31 PROCEDURE — 3008F BODY MASS INDEX DOCD: CPT | Mod: CPTII,S$GLB,, | Performed by: INTERNAL MEDICINE

## 2022-05-31 PROCEDURE — 1101F PT FALLS ASSESS-DOCD LE1/YR: CPT | Mod: CPTII,S$GLB,, | Performed by: INTERNAL MEDICINE

## 2022-05-31 NOTE — PROGRESS NOTES
"  PATIENT: Roma Dunbar  MRN: [id number]  DATE: 5/31/2022    Chief Complaint:  Metastatic disease    Subjective:     History of Present Illness:     PCP-Dr.Lashathan Crespo ([facility])    Patient with history of emphysema, h/o right leg amputation and peripheral vascular disease.    Complains of abdominal pain and weight loss, last 20 lb in the last 2 months.  Feels weak and tired.  Now in a wheelchair.    CEA, AFP, hepatitis-C antibody-normal/negative    CBC 04/20/2022, hemoglobin 17, hematocrit 51.8, platelets 109, WBC 7.8.  Creatinine 0.8, calcium 9.9, total bilirubin 1.2, AST 97, ALT 43, hemoglobin A1c 5.1, TSH 3.4    04/28/2022, CT abdomen pelvis with contrast-More than 10 pulmonary nodules noted in the portions of lower lungs.  Innumerable poorly defined solid masses scattered throughout the right and left hepatic lobes.  Gallbladder, spleen, pancreas unremarkable.  No abnormally enlarged abdominal lymph nodes are identified.  No free air within the abdomen.  No bowel obstruction.    Accompanied by her 2 daughters, Ms. Rivero and Ms. Hemphill.    Past Medical, Surgical, Family and Social History Reviewed.    Medications and Allergies reviewed    Review of Systems   Constitutional: Positive for activity change, appetite change and fatigue.   Gastrointestinal: Positive for abdominal pain and constipation.   Musculoskeletal: Positive for arthralgias and gait problem.   Neurological: Positive for weakness.   All other systems reviewed and are negative.      Objective:     Vitals:    05/31/22 0917   BP: (!) 171/73   BP Location: Left arm   Patient Position: Sitting   BP Method: Medium (Automatic)   Pulse: 73   Resp: 18   Temp: 97.6 °F (36.4 °C)   TempSrc: Oral   SpO2: (!) 94%   Weight: 51.3 kg (113 lb 1.5 oz)   Height: 5' 2" (1.575 m)       BMI: Body mass index is 20.69 kg/m².    Physical Exam  Vitals and nursing note reviewed.   Constitutional:       General: She is not in acute distress.  Pulmonary:      Effort: " Pulmonary effort is normal.      Breath sounds: Normal breath sounds.   Neurological:      Mental Status: She is alert. Mental status is at baseline.         Assessment:       1. Secondary liver cancer    2. Chronic obstructive pulmonary disease, unspecified COPD type    3. PAD (peripheral artery disease)        Plan:   Secondary liver cancer/metastatic disease  -imaging reviewed  -she has extensive metastatic disease in both lobes of the liver  -her performance status is poor  -discussed management options  -option 1, pursue CT-guided biopsy followed by cancer treatment based on biopsy results, discussed pros and cons of this approach  -option 2, pursue palliative care approach given poor performance status  -after reviewing all the options, Ms. Dunbar prefers a palliative care approach, this is medically very reasonable given her declining performance status  -discussed life expectancy, recommended hospice care    She will be seeing Dr. Crespo later today to discuss this further.  All questions answered    Many thanks to Dr. Crespo for the kind referral